# Patient Record
Sex: MALE | Race: WHITE | NOT HISPANIC OR LATINO | Employment: OTHER | URBAN - METROPOLITAN AREA
[De-identification: names, ages, dates, MRNs, and addresses within clinical notes are randomized per-mention and may not be internally consistent; named-entity substitution may affect disease eponyms.]

---

## 2017-02-23 ENCOUNTER — OFFICE VISIT (OUTPATIENT)
Dept: URGENT CARE | Facility: CLINIC | Age: 72
End: 2017-02-23
Payer: MEDICARE

## 2017-02-23 ENCOUNTER — HOSPITAL ENCOUNTER (OUTPATIENT)
Facility: MEDICAL CENTER | Age: 72
End: 2017-02-23
Attending: EMERGENCY MEDICINE
Payer: MEDICARE

## 2017-02-23 VITALS
DIASTOLIC BLOOD PRESSURE: 80 MMHG | HEART RATE: 98 BPM | OXYGEN SATURATION: 100 % | BODY MASS INDEX: 30.06 KG/M2 | RESPIRATION RATE: 16 BRPM | HEIGHT: 70 IN | SYSTOLIC BLOOD PRESSURE: 150 MMHG | TEMPERATURE: 98.7 F | WEIGHT: 210 LBS

## 2017-02-23 DIAGNOSIS — L25.3 CONTACT DERMATITIS DUE TO OTHER CHEMICAL PRODUCT, UNSPECIFIED CONTACT DERMATITIS TYPE: ICD-10-CM

## 2017-02-23 DIAGNOSIS — L03.211 FACIAL CELLULITIS: ICD-10-CM

## 2017-02-23 DIAGNOSIS — D49.0: ICD-10-CM

## 2017-02-23 PROCEDURE — G8432 DEP SCR NOT DOC, RNG: HCPCS | Performed by: EMERGENCY MEDICINE

## 2017-02-23 PROCEDURE — 4040F PNEUMOC VAC/ADMIN/RCVD: CPT | Mod: 8P | Performed by: EMERGENCY MEDICINE

## 2017-02-23 PROCEDURE — 1101F PT FALLS ASSESS-DOCD LE1/YR: CPT | Mod: 8P | Performed by: EMERGENCY MEDICINE

## 2017-02-23 PROCEDURE — G8419 CALC BMI OUT NRM PARAM NOF/U: HCPCS | Performed by: EMERGENCY MEDICINE

## 2017-02-23 PROCEDURE — 87070 CULTURE OTHR SPECIMN AEROBIC: CPT

## 2017-02-23 PROCEDURE — 87186 SC STD MICRODIL/AGAR DIL: CPT

## 2017-02-23 PROCEDURE — G8484 FLU IMMUNIZE NO ADMIN: HCPCS | Performed by: EMERGENCY MEDICINE

## 2017-02-23 PROCEDURE — 99203 OFFICE O/P NEW LOW 30 MIN: CPT | Performed by: EMERGENCY MEDICINE

## 2017-02-23 PROCEDURE — 87077 CULTURE AEROBIC IDENTIFY: CPT

## 2017-02-23 PROCEDURE — 87205 SMEAR GRAM STAIN: CPT

## 2017-02-23 PROCEDURE — 1036F TOBACCO NON-USER: CPT | Performed by: EMERGENCY MEDICINE

## 2017-02-23 PROCEDURE — 3017F COLORECTAL CA SCREEN DOC REV: CPT | Mod: 8P | Performed by: EMERGENCY MEDICINE

## 2017-02-23 RX ORDER — CEPHALEXIN 500 MG/1
1000 CAPSULE ORAL 2 TIMES DAILY
Qty: 40 CAP | Refills: 0 | Status: SHIPPED | OUTPATIENT
Start: 2017-02-23 | End: 2017-03-05

## 2017-02-23 RX ORDER — TRIAMCINOLONE ACETONIDE 1 MG/G
1 CREAM TOPICAL 2 TIMES DAILY
Qty: 30 G | Refills: 0 | Status: ON HOLD | OUTPATIENT
Start: 2017-02-23 | End: 2021-08-12

## 2017-02-23 ASSESSMENT — ENCOUNTER SYMPTOMS
WEIGHT LOSS: 0
HEADACHES: 0
SORE THROAT: 0
LIP LACERATION: 1
SHORTNESS OF BREATH: 0
RHINORRHEA: 0
COUGH: 0
FEVER: 0
EYE PAIN: 0
ANOREXIA: 0
FATIGUE: 0
CHILLS: 0

## 2017-02-23 NOTE — MR AVS SNAPSHOT
"        Rajesh De Luna   2017 3:00 PM   Office Visit   MRN: [id number]    Department:  [facility]   Dept Phone:  [phone]    Description:  Male : 1945   Provider:  Michael Miller M.D.           Reason for Visit     Oral Pain red blisters around mouth x 1 month      Allergies as of 2017     Allergen Noted Reactions    Penicillins 2017         You were diagnosed with     Neoplasm of upper lip   [235557]       Facial cellulitis   [[id number]]       Contact dermatitis due to other chemical product, unspecified contact dermatitis type   [1522813]         Vital Signs     Blood Pressure Pulse Temperature Respirations Height Weight    150/80 mmHg 98 37.1 °C (98.7 °F) 16 1.778 m (5' 10\") 95.255 kg (210 lb)    Body Mass Index Oxygen Saturation Smoking Status             30.13 kg/m2 100% Never Smoker          Basic Information     Date Of Birth Sex Race Ethnicity Preferred Language    1945 Male White Non- English      Health Maintenance     Patient has no pending health maintenance at this time      Current Immunizations     No immunizations on file.      Below and/or attached are the medications your provider expects you to take. Review all of your home medications and newly ordered medications with your provider and/or pharmacist. Follow medication instructions as directed by your provider and/or pharmacist. Please keep your medication list with you and share with your provider. Update the information when medications are discontinued, doses are changed, or new medications (including over-the-counter products) are added; and carry medication information at all times in the event of emergency situations     Allergies:  PENICILLINS - (reactions not documented)               Medications  Valid as of: 2017 -  3:36 PM    Generic Name Brand Name Tablet Size Instructions for use    Cephalexin (Cap) KEFLEX 500 MG Take 2 Caps by mouth 2 times a day for 10 days.       " Triamcinolone Acetonide (Cream) KENALOG 0.1 % Apply 1 Application to affected area(s) 2 times a day.        .                 Medicines prescribed today were sent to:     [facility] - [place], [place] - [address] [place] [address]    Phone: [phone] Fax: [phone]    Open 24 Hours?: No      Medication refill instructions:       If your prescription bottle indicates you have medication refills left, it is not necessary to call your provider’s office. Please contact your pharmacy and they will refill your medication.    If your prescription bottle indicates you do not have any refills left, you may request refills at any time through one of the following ways: The online [facility] system (except Urgent Care), by calling your provider’s office, or by asking your pharmacy to contact your provider’s office with a refill request. Medication refills are processed only during regular business hours and may not be available until the next business day. Your provider may request additional information or to have a follow-up visit with you prior to refilling your medication.   *Please Note: Medication refills are assigned a new Rx number when refilled electronically. Your pharmacy may indicate that no refills were authorized even though a new prescription for the same medication is available at the pharmacy. Please request the medicine by name with the pharmacy before contacting your provider for a refill.        Referral     A referral request has been sent to our patient care coordination department. Please allow 3-5 business days for us to process this request and contact you either by phone or mail. If you do not hear from us by the 5th business day, please call us at [phone].           [facility] Status: Patient Declined

## 2017-02-23 NOTE — PROGRESS NOTES
"Subjective:      Rajesh De Luna is a 71 y.o. male who presents with Oral Pain            Rash  This is a new problem. The problem has been gradually worsening since onset. The affected locations include the face and lips. The rash is characterized by itchiness and draining. Associated with: Carmex, Abreva. Associated symptoms include facial edema. Pertinent negatives include no anorexia, congestion, cough, eye pain, fatigue, fever, rhinorrhea, shortness of breath or sore throat.    notes onset of left upper lip lesion 2 months ago; tried over-the-counter products with gradual worsening. No PCP, hasn't seen a doctor in many years. Notes occasional cigar use, no chewing tobacco, no regular alcohol use    Review of Systems   Constitutional: Negative for fever, chills, weight loss, malaise/fatigue and fatigue.   HENT: Negative for congestion, nosebleeds, rhinorrhea and sore throat.    Eyes: Negative for pain.   Respiratory: Negative for cough and shortness of breath.    Gastrointestinal: Negative for anorexia.   Skin: Positive for itching and rash.   Neurological: Negative for headaches.       PMH:  has no past medical history on file.  MEDS:   Current outpatient prescriptions:   •  triamcinolone acetonide (KENALOG) 0.1 % Cream, Apply 1 Application to affected area(s) 2 times a day., Disp: 30 g, Rfl: 0  •  cephALEXin (KEFLEX) 500 MG Cap, Take 2 Caps by mouth 2 times a day for 10 days., Disp: 40 Cap, Rfl: 0  ALLERGIES:   Allergies   Allergen Reactions   • Penicillins      SURGHX: History reviewed. No pertinent past surgical history.  SOCHX:  reports that he has never smoked. He does not have any smokeless tobacco history on file.  FH: family history is not on file.     Objective:     /80 mmHg  Pulse 98  Temp(Src) 37.1 °C (98.7 °F)  Resp 16  Ht 1.778 m (5' 10\")  Wt 95.255 kg (210 lb)  BMI 30.13 kg/m2  SpO2 100%     Physical Exam   Constitutional: He is oriented to person, place, and time. He appears " well-developed and well-nourished. He is cooperative. He does not have a sickly appearance. No distress.   HENT:   Head: Normocephalic.   Right Ear: External ear normal.   Left Ear: External ear normal.   Nose: Nose normal.   Mouth/Throat: Uvula is midline. Mucous membranes are not pale and not dry. Abnormal dentition. No uvula swelling. No oropharyngeal exudate, posterior oropharyngeal edema or posterior oropharyngeal erythema.       Pulmonary/Chest: Effort normal.   Lymphadenopathy:        Head (left side): No submandibular adenopathy present.     He has no cervical adenopathy.   Neurological: He is alert and oriented to person, place, and time.   Skin: Rash noted.   Left midface erythema, localized to left nasolabial region. Coalescent finely vesicular component adjacent to left upper lip lesion.   Psychiatric: He has a normal mood and affect.          Advised need for regular PCP follow-up     Assessment/Plan:     1. 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 Facial cellulitis  Advised need for recheck in 2 days  - cephALEXin (KEFLEX) 500 MG Cap; Take 2 Caps by mouth 2 times a day for 10 days.  Dispense: 40 Cap; Refill: 0    3. Contact dermatitis due to other chemical product, unspecified contact dermatitis type  Advised discontinue all over-the-counter topical products  - triamcinolone acetonide (KENALOG) 0.1 % Cream; Apply 1 Application to affected area(s) 2 times a day.  Dispense: 30 g; Refill: 0

## 2017-02-24 LAB
GRAM STN SPEC: NORMAL
SIGNIFICANT IND 70042: NORMAL
SITE SITE: NORMAL
SOURCE SOURCE: NORMAL

## 2017-02-25 LAB
BACTERIA WND AEROBE CULT: ABNORMAL
BACTERIA WND AEROBE CULT: ABNORMAL
GRAM STN SPEC: ABNORMAL
SIGNIFICANT IND 70042: ABNORMAL
SITE SITE: ABNORMAL
SOURCE SOURCE: ABNORMAL

## 2017-02-27 ENCOUNTER — TELEPHONE (OUTPATIENT)
Dept: URGENT CARE | Facility: CLINIC | Age: 72
End: 2017-02-27

## 2017-02-27 NOTE — TELEPHONE ENCOUNTER
Please notify patient of culture with Staph bacteria; should resolve with Rx cephalexin.  Needs F/U dermatology ASAP.

## 2017-02-28 ENCOUNTER — TELEPHONE (OUTPATIENT)
Dept: URGENT CARE | Facility: CLINIC | Age: 72
End: 2017-02-28

## 2017-03-01 ENCOUNTER — TELEPHONE (OUTPATIENT)
Dept: URGENT CARE | Facility: PHYSICIAN GROUP | Age: 72
End: 2017-03-01

## 2019-08-22 ENCOUNTER — OFFICE VISIT (OUTPATIENT)
Dept: URGENT CARE | Facility: CLINIC | Age: 74
End: 2019-08-22
Payer: MEDICARE

## 2019-08-22 VITALS
BODY MASS INDEX: 26.08 KG/M2 | HEART RATE: 67 BPM | DIASTOLIC BLOOD PRESSURE: 98 MMHG | RESPIRATION RATE: 12 BRPM | TEMPERATURE: 98.4 F | SYSTOLIC BLOOD PRESSURE: 138 MMHG | WEIGHT: 182.2 LBS | HEIGHT: 70 IN | OXYGEN SATURATION: 96 %

## 2019-08-22 DIAGNOSIS — I10 HYPERTENSION, UNSPECIFIED TYPE: ICD-10-CM

## 2019-08-22 PROCEDURE — 99214 OFFICE O/P EST MOD 30 MIN: CPT | Performed by: PHYSICIAN ASSISTANT

## 2019-08-22 RX ORDER — ATORVASTATIN CALCIUM 20 MG/1
20 TABLET, FILM COATED ORAL DAILY
Qty: 90 TAB | Refills: 1 | Status: SHIPPED | OUTPATIENT
Start: 2019-08-22 | End: 2019-11-20

## 2019-08-22 SDOH — HEALTH STABILITY: MENTAL HEALTH: HOW OFTEN DO YOU HAVE 6 OR MORE DRINKS ON ONE OCCASION?: MONTHLY

## 2019-08-22 ASSESSMENT — ENCOUNTER SYMPTOMS
CONSTITUTIONAL NEGATIVE: 1
RESPIRATORY NEGATIVE: 1
ORTHOPNEA: 0
MUSCULOSKELETAL NEGATIVE: 1
PSYCHIATRIC NEGATIVE: 1
EYES NEGATIVE: 1
PND: 0
HEADACHES: 0
GASTROINTESTINAL NEGATIVE: 1
BLURRED VISION: 0
CARDIOVASCULAR NEGATIVE: 1
HYPERTENSION: 1
NEUROLOGICAL NEGATIVE: 1

## 2019-08-23 NOTE — PROGRESS NOTES
"Subjective:      Rajesh De Luna is a 74 y.o. male who presents with Blood Pressure Problem (x 7 days.  Pt. states that he ran out of Lipitor 20 mg, 21 days ago.  He just moved from [place] and has a np appt in November.)            Hypertension   This is a chronic problem. The problem is unchanged. Pertinent negatives include no blurred vision, headaches, orthopnea, peripheral edema or PND. Past treatments include angiotensin blockers. There are no compliance problems.        Review of Systems   Constitutional: Negative.    HENT: Negative.    Eyes: Negative.  Negative for blurred vision.   Respiratory: Negative.    Cardiovascular: Negative.  Negative for orthopnea and PND.   Gastrointestinal: Negative.    Genitourinary: Negative.    Musculoskeletal: Negative.    Skin: Negative.    Neurological: Negative.  Negative for headaches.   Endo/Heme/Allergies: Negative.    Psychiatric/Behavioral: Negative.      PMH:  has no past medical history on file.  MEDS:   Current Outpatient Medications:   •  atorvastatin (LIPITOR) 20 MG Tab, Take 1 Tab by mouth every day for 90 days., Disp: 90 Tab, Rfl: 1  •  triamcinolone acetonide (KENALOG) 0.1 % Cream, Apply 1 Application to affected area(s) 2 times a day., Disp: 30 g, Rfl: 0  ALLERGIES:   Allergies   Allergen Reactions   • Penicillins      SURGHX: History reviewed. No pertinent surgical history.  SOCHX:  reports that he has been smoking. He has never used smokeless tobacco. He reports that he drinks about 14.4 oz of alcohol per week. He reports that he does not use drugs.  FH: Family history was reviewed, no pertinent findings to report  Medications, Allergies, and current problem list reviewed today in Epic       Objective:     /98   Pulse 67   Temp 36.9 °C (98.4 °F) (Temporal)   Resp 12   Ht 1.778 m (5' 10\")   Wt 82.6 kg (182 lb 3.2 oz)   SpO2 96%   BMI 26.14 kg/m²      Physical Exam   Constitutional: He is oriented to person, place, and time. He appears " well-developed and well-nourished.  Non-toxic appearance. He does not have a sickly appearance. He does not appear ill. No distress.   HENT:   Head: Normocephalic and atraumatic.   Right Ear: External ear normal.   Left Ear: External ear normal.   Eyes: Conjunctivae and EOM are normal.   Neck: Normal range of motion. Neck supple.   Cardiovascular: Normal rate, regular rhythm, normal heart sounds, intact distal pulses and normal pulses.   Pulmonary/Chest: Effort normal and breath sounds normal.   Musculoskeletal: He exhibits no edema, tenderness or deformity.   Neurological: He is alert and oriented to person, place, and time. He has normal reflexes. He displays normal reflexes. He exhibits normal muscle tone. Coordination normal.   Skin: Skin is warm and dry. He is not diaphoretic.   Psychiatric: He has a normal mood and affect. His behavior is normal. Judgment and thought content normal.   Vitals reviewed.              Assessment/Plan:     1. Hypertension, unspecified type  - PCP follow up in 3 months  - atorvastatin (LIPITOR) 20 MG Tab; Take 1 Tab by mouth every day for 90 days.  Dispense: 90 Tab; Refill: 1    Differential diagnosis, natural history, supportive care discussed. Follow-up with primary care provider within 7-10 days, emergency room precautions discussed.  Patient and/or family appears understanding of information.  Handout and review of patients diagnosis and treatment was discussed extensively.

## 2019-08-27 ENCOUNTER — TELEPHONE (OUTPATIENT)
Dept: URGENT CARE | Facility: CLINIC | Age: 74
End: 2019-08-27

## 2019-08-27 NOTE — TELEPHONE ENCOUNTER
Pt came into clinic. He says he does not want the generic medicine atorvastatin. He wants Lipitor sent in.

## 2020-06-12 ENCOUNTER — OFFICE VISIT (OUTPATIENT)
Dept: URGENT CARE | Facility: CLINIC | Age: 75
End: 2020-06-12
Payer: MEDICARE

## 2020-06-12 VITALS
BODY MASS INDEX: 26.05 KG/M2 | HEART RATE: 66 BPM | HEIGHT: 70 IN | SYSTOLIC BLOOD PRESSURE: 152 MMHG | TEMPERATURE: 97.7 F | OXYGEN SATURATION: 98 % | RESPIRATION RATE: 14 BRPM | DIASTOLIC BLOOD PRESSURE: 84 MMHG | WEIGHT: 182 LBS

## 2020-06-12 DIAGNOSIS — I10 HYPERTENSION, UNSPECIFIED TYPE: ICD-10-CM

## 2020-06-12 PROCEDURE — 99213 OFFICE O/P EST LOW 20 MIN: CPT | Performed by: PHYSICIAN ASSISTANT

## 2020-06-12 RX ORDER — ATORVASTATIN CALCIUM 20 MG/1
20 TABLET, FILM COATED ORAL DAILY
Qty: 30 TAB | Refills: 3 | Status: ON HOLD | OUTPATIENT
Start: 2020-06-12 | End: 2021-06-13 | Stop reason: SDUPTHER

## 2020-06-12 ASSESSMENT — ENCOUNTER SYMPTOMS
EYE PAIN: 0
PALPITATIONS: 0
SPEECH CHANGE: 0
DIZZINESS: 0
DIARRHEA: 0
SORE THROAT: 0
VOMITING: 0
FEVER: 0
WEAKNESS: 0
DIAPHORESIS: 0
PHOTOPHOBIA: 0
WHEEZING: 0
NAUSEA: 0
MYALGIAS: 0
TREMORS: 0
SENSORY CHANGE: 0
LOSS OF CONSCIOUSNESS: 0
SINUS PAIN: 0
DOUBLE VISION: 0
BLURRED VISION: 0
HEADACHES: 1
COUGH: 0
SHORTNESS OF BREATH: 0
CHILLS: 0
ABDOMINAL PAIN: 0
WEIGHT LOSS: 0
TINGLING: 0

## 2020-06-12 NOTE — PROGRESS NOTES
Subjective:   Rajesh De Luna is a 74 y.o. male who presents for Headache (rapid heart beat, blood pressure refill )    HPI:  This is a 74-year-old male presenting to the clinic requesting a refill for his cholesterol medication.  The patient has been on this medication for years however stopped taking it the last few months and is now concerned and would like to restart the medication.  Currently he does not have a primary care provider in town.  Currently the patient is asymptomatic he does not have a headache.  The patient states he has an occasional rapid heartbeat this is been chronic and going on for years.  The patient denies any chest pain, shortness of breath, fevers, chills, nausea or vomiting.  He has not experienced any change in vision, paresthesias or syncopal events.    Review of Systems   Constitutional: Negative for chills, diaphoresis, fever, malaise/fatigue and weight loss.   HENT: Negative for congestion, sinus pain and sore throat.    Eyes: Negative for blurred vision, double vision, photophobia and pain.   Respiratory: Negative for cough, shortness of breath and wheezing.    Cardiovascular: Negative for chest pain and palpitations.   Gastrointestinal: Negative for abdominal pain, diarrhea, nausea and vomiting.   Musculoskeletal: Negative for myalgias.   Skin: Negative for rash.   Neurological: Positive for headaches (Intermittent mild headaches that have been going on for years the patient states). Negative for dizziness, tingling, tremors, sensory change, speech change, loss of consciousness and weakness.       Medications:    • triamcinolone acetonide Crea    Allergies: Penicillins    Problem List: Rajesh De Luna does not have a problem list on file.    Surgical History:  No past surgical history on file.    Past Social Hx: Rajesh De Luna  reports that he has been smoking. He has never used smokeless tobacco. He reports current alcohol use of about 14.4 oz of alcohol per week. He reports  "that he does not use drugs.     Past Family Hx:  Rajesh De Luna family history is not on file.     Problem list, medications, and allergies reviewed by myself today in Epic.     Objective:     /84   Pulse 66   Temp 36.5 °C (97.7 °F) (Temporal)   Resp 14   Ht 1.778 m (5' 10\")   Wt 82.6 kg (182 lb)   SpO2 98%   BMI 26.11 kg/m²     Physical Exam  Constitutional:       General: He is not in acute distress.     Appearance: Normal appearance. He is not ill-appearing, toxic-appearing or diaphoretic.   HENT:      Head: Normocephalic and atraumatic.      Right Ear: Tympanic membrane, ear canal and external ear normal.      Left Ear: Tympanic membrane, ear canal and external ear normal.      Nose: Nose normal. No congestion or rhinorrhea.      Mouth/Throat:      Mouth: Mucous membranes are moist.      Pharynx: No oropharyngeal exudate or posterior oropharyngeal erythema.   Eyes:      Extraocular Movements: Extraocular movements intact.      Conjunctiva/sclera: Conjunctivae normal.      Pupils: Pupils are equal, round, and reactive to light.   Neck:      Musculoskeletal: Normal range of motion. No muscular tenderness.      Vascular: No carotid bruit.   Cardiovascular:      Rate and Rhythm: Normal rate and regular rhythm.      Pulses: Normal pulses.      Heart sounds: Normal heart sounds.   Pulmonary:      Effort: Pulmonary effort is normal.      Breath sounds: Normal breath sounds. No wheezing.   Abdominal:      Palpations: Abdomen is soft.      Tenderness: There is no abdominal tenderness.   Lymphadenopathy:      Cervical: No cervical adenopathy.   Skin:     General: Skin is warm and dry.      Capillary Refill: Capillary refill takes less than 2 seconds.   Neurological:      General: No focal deficit present.      Mental Status: He is alert and oriented to person, place, and time. Mental status is at baseline.      Motor: No weakness.      Gait: Gait normal.      Deep Tendon Reflexes: Reflexes normal. "   Psychiatric:         Mood and Affect: Mood normal.         Behavior: Behavior normal.         Thought Content: Thought content normal.           Assessment/Plan:     Diagnosis and associated orders:     1. Hypertension, unspecified type    - atorvastatin (LIPITOR) 20 MG Tab; Take 1 Tab by mouth every day.  Dispense: 30 Tab; Refill: 3    - REFERRAL TO FOLLOW-UP WITH PRIMARY CARE   Comments/MDM:       • The patient presented to the clinic today requesting a refill of Lipitor.  He was previously on this medication for years and recently stopped.  He states he is a little nervous after he stopped and would like to restart the medication.  He currently does not have a PCP in town.  A referral to a PCP was sent.  The patient was asymptomatic and had a normal physical exam in clinic today.  Follow-up in the clinic for any presentation or concerning symptoms.           Differential diagnosis, natural history, supportive care, and indications for immediate follow-up discussed.    Advised the patient to follow-up with the primary care physician for recheck, reevaluation, and consideration of further management.    Please note that this dictation was created using voice recognition software. I have made reasonable attempt to correct obvious errors, but I expect that there are errors of grammar and possibly content that I did not discover before finalizing the note.    This note was electronically signed by ANITA Min PA-C

## 2021-01-14 DIAGNOSIS — Z23 NEED FOR VACCINATION: ICD-10-CM

## 2021-06-01 ENCOUNTER — APPOINTMENT (OUTPATIENT)
Dept: RADIOLOGY | Facility: MEDICAL CENTER | Age: 76
DRG: 308 | End: 2021-06-01
Attending: EMERGENCY MEDICINE
Payer: MEDICARE

## 2021-06-01 ENCOUNTER — HOSPITAL ENCOUNTER (INPATIENT)
Facility: MEDICAL CENTER | Age: 76
LOS: 4 days | DRG: 308 | End: 2021-06-05
Attending: EMERGENCY MEDICINE | Admitting: INTERNAL MEDICINE
Payer: MEDICARE

## 2021-06-01 DIAGNOSIS — Z91.148 HISTORY OF MEDICATION NONCOMPLIANCE: ICD-10-CM

## 2021-06-01 DIAGNOSIS — J81.0 ACUTE PULMONARY EDEMA (HCC): ICD-10-CM

## 2021-06-01 DIAGNOSIS — I48.91 ATRIAL FIBRILLATION WITH RVR (HCC): ICD-10-CM

## 2021-06-01 PROBLEM — I48.0 PAROXYSMAL ATRIAL FIBRILLATION (HCC): Status: ACTIVE | Noted: 2021-06-01

## 2021-06-01 PROBLEM — R79.89 ELEVATED TROPONIN: Status: ACTIVE | Noted: 2021-06-01

## 2021-06-01 PROBLEM — R06.02 SHORTNESS OF BREATH: Status: ACTIVE | Noted: 2021-06-01

## 2021-06-01 PROBLEM — J96.01 ACUTE RESPIRATORY FAILURE WITH HYPOXIA (HCC): Status: ACTIVE | Noted: 2021-06-01

## 2021-06-01 LAB
ALBUMIN SERPL BCP-MCNC: 3.9 G/DL (ref 3.2–4.9)
ALBUMIN/GLOB SERPL: 1.3 G/DL
ALP SERPL-CCNC: 164 U/L (ref 30–99)
ALT SERPL-CCNC: 22 U/L (ref 2–50)
ANION GAP SERPL CALC-SCNC: 13 MMOL/L (ref 7–16)
AST SERPL-CCNC: 21 U/L (ref 12–45)
BASOPHILS # BLD AUTO: 0.8 % (ref 0–1.8)
BASOPHILS # BLD: 0.06 K/UL (ref 0–0.12)
BILIRUB SERPL-MCNC: 1.1 MG/DL (ref 0.1–1.5)
BUN SERPL-MCNC: 19 MG/DL (ref 8–22)
CALCIUM SERPL-MCNC: 8.8 MG/DL (ref 8.5–10.5)
CHLORIDE SERPL-SCNC: 110 MMOL/L (ref 96–112)
CO2 SERPL-SCNC: 17 MMOL/L (ref 20–33)
CREAT SERPL-MCNC: 1.03 MG/DL (ref 0.5–1.4)
EOSINOPHIL # BLD AUTO: 0.16 K/UL (ref 0–0.51)
EOSINOPHIL NFR BLD: 2.1 % (ref 0–6.9)
ERYTHROCYTE [DISTWIDTH] IN BLOOD BY AUTOMATED COUNT: 51.8 FL (ref 35.9–50)
GLOBULIN SER CALC-MCNC: 2.9 G/DL (ref 1.9–3.5)
GLUCOSE SERPL-MCNC: 100 MG/DL (ref 65–99)
HCT VFR BLD AUTO: 46.6 % (ref 42–52)
HGB BLD-MCNC: 15.3 G/DL (ref 14–18)
IMM GRANULOCYTES # BLD AUTO: 0.02 K/UL (ref 0–0.11)
IMM GRANULOCYTES NFR BLD AUTO: 0.3 % (ref 0–0.9)
LYMPHOCYTES # BLD AUTO: 1.58 K/UL (ref 1–4.8)
LYMPHOCYTES NFR BLD: 21.1 % (ref 22–41)
MCH RBC QN AUTO: 32 PG (ref 27–33)
MCHC RBC AUTO-ENTMCNC: 32.8 G/DL (ref 33.7–35.3)
MCV RBC AUTO: 97.5 FL (ref 81.4–97.8)
MONOCYTES # BLD AUTO: 0.65 K/UL (ref 0–0.85)
MONOCYTES NFR BLD AUTO: 8.7 % (ref 0–13.4)
NEUTROPHILS # BLD AUTO: 5.03 K/UL (ref 1.82–7.42)
NEUTROPHILS NFR BLD: 67 % (ref 44–72)
NRBC # BLD AUTO: 0 K/UL
NRBC BLD-RTO: 0 /100 WBC
NT-PROBNP SERPL IA-MCNC: 4516 PG/ML (ref 0–125)
PLATELET # BLD AUTO: 200 K/UL (ref 164–446)
PMV BLD AUTO: 11 FL (ref 9–12.9)
POTASSIUM SERPL-SCNC: 4.7 MMOL/L (ref 3.6–5.5)
PROT SERPL-MCNC: 6.8 G/DL (ref 6–8.2)
RBC # BLD AUTO: 4.78 M/UL (ref 4.7–6.1)
SODIUM SERPL-SCNC: 140 MMOL/L (ref 135–145)
TROPONIN T SERPL-MCNC: 28 NG/L (ref 6–19)
WBC # BLD AUTO: 7.5 K/UL (ref 4.8–10.8)

## 2021-06-01 PROCEDURE — 96374 THER/PROPH/DIAG INJ IV PUSH: CPT

## 2021-06-01 PROCEDURE — 93005 ELECTROCARDIOGRAM TRACING: CPT

## 2021-06-01 PROCEDURE — A9270 NON-COVERED ITEM OR SERVICE: HCPCS | Performed by: INTERNAL MEDICINE

## 2021-06-01 PROCEDURE — 700111 HCHG RX REV CODE 636 W/ 250 OVERRIDE (IP): Performed by: EMERGENCY MEDICINE

## 2021-06-01 PROCEDURE — 770020 HCHG ROOM/CARE - TELE (206)

## 2021-06-01 PROCEDURE — 99285 EMERGENCY DEPT VISIT HI MDM: CPT

## 2021-06-01 PROCEDURE — 93005 ELECTROCARDIOGRAM TRACING: CPT | Performed by: EMERGENCY MEDICINE

## 2021-06-01 PROCEDURE — 96375 TX/PRO/DX INJ NEW DRUG ADDON: CPT

## 2021-06-01 PROCEDURE — 71045 X-RAY EXAM CHEST 1 VIEW: CPT

## 2021-06-01 PROCEDURE — 85025 COMPLETE CBC W/AUTO DIFF WBC: CPT

## 2021-06-01 PROCEDURE — 80053 COMPREHEN METABOLIC PANEL: CPT

## 2021-06-01 PROCEDURE — 84484 ASSAY OF TROPONIN QUANT: CPT

## 2021-06-01 PROCEDURE — 83880 ASSAY OF NATRIURETIC PEPTIDE: CPT

## 2021-06-01 PROCEDURE — 700102 HCHG RX REV CODE 250 W/ 637 OVERRIDE(OP): Performed by: INTERNAL MEDICINE

## 2021-06-01 PROCEDURE — 99221 1ST HOSP IP/OBS SF/LOW 40: CPT | Mod: AI | Performed by: INTERNAL MEDICINE

## 2021-06-01 RX ORDER — LISINOPRIL 10 MG/1
5 TABLET ORAL
Status: DISCONTINUED | OUTPATIENT
Start: 2021-06-02 | End: 2021-06-01

## 2021-06-01 RX ORDER — FUROSEMIDE 10 MG/ML
20 INJECTION INTRAMUSCULAR; INTRAVENOUS ONCE
Status: COMPLETED | OUTPATIENT
Start: 2021-06-01 | End: 2021-06-01

## 2021-06-01 RX ORDER — AMOXICILLIN 250 MG
2 CAPSULE ORAL 2 TIMES DAILY
Status: DISCONTINUED | OUTPATIENT
Start: 2021-06-01 | End: 2021-06-05 | Stop reason: HOSPADM

## 2021-06-01 RX ORDER — FUROSEMIDE 10 MG/ML
40 INJECTION INTRAMUSCULAR; INTRAVENOUS
Status: DISCONTINUED | OUTPATIENT
Start: 2021-06-02 | End: 2021-06-05 | Stop reason: HOSPADM

## 2021-06-01 RX ORDER — BISACODYL 10 MG
10 SUPPOSITORY, RECTAL RECTAL
Status: DISCONTINUED | OUTPATIENT
Start: 2021-06-01 | End: 2021-06-05 | Stop reason: HOSPADM

## 2021-06-01 RX ORDER — ONDANSETRON 4 MG/1
4 TABLET, ORALLY DISINTEGRATING ORAL EVERY 4 HOURS PRN
Status: DISCONTINUED | OUTPATIENT
Start: 2021-06-01 | End: 2021-06-05 | Stop reason: HOSPADM

## 2021-06-01 RX ORDER — ACETAMINOPHEN 325 MG/1
650 TABLET ORAL EVERY 6 HOURS PRN
Status: DISCONTINUED | OUTPATIENT
Start: 2021-06-01 | End: 2021-06-05 | Stop reason: HOSPADM

## 2021-06-01 RX ORDER — ATORVASTATIN CALCIUM 20 MG/1
20 TABLET, FILM COATED ORAL DAILY
Status: DISCONTINUED | OUTPATIENT
Start: 2021-06-02 | End: 2021-06-05 | Stop reason: HOSPADM

## 2021-06-01 RX ORDER — ONDANSETRON 2 MG/ML
4 INJECTION INTRAMUSCULAR; INTRAVENOUS EVERY 4 HOURS PRN
Status: DISCONTINUED | OUTPATIENT
Start: 2021-06-01 | End: 2021-06-05 | Stop reason: HOSPADM

## 2021-06-01 RX ORDER — DILTIAZEM HYDROCHLORIDE 5 MG/ML
20 INJECTION INTRAVENOUS ONCE
Status: COMPLETED | OUTPATIENT
Start: 2021-06-01 | End: 2021-06-01

## 2021-06-01 RX ORDER — METOPROLOL TARTRATE 1 MG/ML
5 INJECTION, SOLUTION INTRAVENOUS
Status: DISCONTINUED | OUTPATIENT
Start: 2021-06-01 | End: 2021-06-05 | Stop reason: HOSPADM

## 2021-06-01 RX ORDER — POLYETHYLENE GLYCOL 3350 17 G/17G
1 POWDER, FOR SOLUTION ORAL
Status: DISCONTINUED | OUTPATIENT
Start: 2021-06-01 | End: 2021-06-05 | Stop reason: HOSPADM

## 2021-06-01 RX ORDER — POTASSIUM CHLORIDE 20 MEQ/1
20 TABLET, EXTENDED RELEASE ORAL DAILY
Status: ON HOLD | COMMUNITY
End: 2021-06-13

## 2021-06-01 RX ORDER — CARVEDILOL 6.25 MG/1
6.25 TABLET ORAL 2 TIMES DAILY WITH MEALS
Status: DISCONTINUED | OUTPATIENT
Start: 2021-06-01 | End: 2021-06-01

## 2021-06-01 RX ORDER — FUROSEMIDE 20 MG/1
20 TABLET ORAL DAILY
Status: ON HOLD | COMMUNITY
End: 2021-06-05 | Stop reason: SDUPTHER

## 2021-06-01 RX ORDER — POTASSIUM CHLORIDE 20 MEQ/1
20 TABLET, EXTENDED RELEASE ORAL 2 TIMES DAILY
Status: DISCONTINUED | OUTPATIENT
Start: 2021-06-01 | End: 2021-06-05 | Stop reason: HOSPADM

## 2021-06-01 RX ADMIN — APIXABAN 5 MG: 5 TABLET, FILM COATED ORAL at 22:34

## 2021-06-01 RX ADMIN — FUROSEMIDE 20 MG: 10 INJECTION, SOLUTION INTRAMUSCULAR; INTRAVENOUS at 20:35

## 2021-06-01 RX ADMIN — DILTIAZEM HYDROCHLORIDE 20 MG: 5 INJECTION INTRAVENOUS at 19:53

## 2021-06-01 RX ADMIN — METOPROLOL TARTRATE 25 MG: 25 TABLET, FILM COATED ORAL at 22:34

## 2021-06-01 RX ADMIN — POTASSIUM CHLORIDE 20 MEQ: 1500 TABLET, EXTENDED RELEASE ORAL at 22:35

## 2021-06-01 ASSESSMENT — ENCOUNTER SYMPTOMS
HEARTBURN: 0
NERVOUS/ANXIOUS: 0
BLURRED VISION: 0
EYE REDNESS: 0
SHORTNESS OF BREATH: 1
FOCAL WEAKNESS: 0
EYE PAIN: 0
NECK PAIN: 0
BACK PAIN: 0
DEPRESSION: 0
CHILLS: 0
SPUTUM PRODUCTION: 0
VOMITING: 0
INSOMNIA: 0
NAUSEA: 0
DIARRHEA: 0
ABDOMINAL PAIN: 0
SEIZURES: 0
WEIGHT LOSS: 0
ORTHOPNEA: 0
STRIDOR: 0
EYE DISCHARGE: 0
COUGH: 0
HEADACHES: 0
FEVER: 0
DIZZINESS: 0
MYALGIAS: 0
PALPITATIONS: 0

## 2021-06-01 ASSESSMENT — PATIENT HEALTH QUESTIONNAIRE - PHQ9
2. FEELING DOWN, DEPRESSED, IRRITABLE, OR HOPELESS: NOT AT ALL
SUM OF ALL RESPONSES TO PHQ9 QUESTIONS 1 AND 2: 0
1. LITTLE INTEREST OR PLEASURE IN DOING THINGS: NOT AT ALL

## 2021-06-01 ASSESSMENT — LIFESTYLE VARIABLES
EVER FELT BAD OR GUILTY ABOUT YOUR DRINKING: NO
AVERAGE NUMBER OF DAYS PER WEEK YOU HAVE A DRINK CONTAINING ALCOHOL: 7
DOES PATIENT WANT TO STOP DRINKING: NO
CONSUMPTION TOTAL: NEGATIVE
HAVE YOU EVER FELT YOU SHOULD CUT DOWN ON YOUR DRINKING: NO
TOTAL SCORE: 0
ALCOHOL_USE: YES
TOTAL SCORE: 0
HAVE PEOPLE ANNOYED YOU BY CRITICIZING YOUR DRINKING: NO
ON A TYPICAL DAY WHEN YOU DRINK ALCOHOL HOW MANY DRINKS DO YOU HAVE: 1
TOTAL SCORE: 0
HOW MANY TIMES IN THE PAST YEAR HAVE YOU HAD 5 OR MORE DRINKS IN A DAY: 0
EVER HAD A DRINK FIRST THING IN THE MORNING TO STEADY YOUR NERVES TO GET RID OF A HANGOVER: NO

## 2021-06-01 ASSESSMENT — CHA2DS2 SCORE
AGE 75 OR GREATER: YES
SEX: MALE
CHF OR LEFT VENTRICULAR DYSFUNCTION: YES
CHA2DS2 VASC SCORE: 3
VASCULAR DISEASE: NO
AGE 65 TO 74: NO
HYPERTENSION: NO
DIABETES: NO
PRIOR STROKE OR TIA OR THROMBOEMBOLISM: NO

## 2021-06-01 ASSESSMENT — COGNITIVE AND FUNCTIONAL STATUS - GENERAL
SUGGESTED CMS G CODE MODIFIER MOBILITY: CH
MOBILITY SCORE: 24
SUGGESTED CMS G CODE MODIFIER DAILY ACTIVITY: CH
DAILY ACTIVITIY SCORE: 24

## 2021-06-01 ASSESSMENT — FIBROSIS 4 INDEX: FIB4 SCORE: 1.72

## 2021-06-02 ENCOUNTER — APPOINTMENT (OUTPATIENT)
Dept: CARDIOLOGY | Facility: MEDICAL CENTER | Age: 76
DRG: 308 | End: 2021-06-02
Attending: INTERNAL MEDICINE
Payer: MEDICARE

## 2021-06-02 LAB
ALBUMIN SERPL BCP-MCNC: 4.1 G/DL (ref 3.2–4.9)
ALBUMIN/GLOB SERPL: 1.4 G/DL
ALP SERPL-CCNC: 167 U/L (ref 30–99)
ALT SERPL-CCNC: 23 U/L (ref 2–50)
ANION GAP SERPL CALC-SCNC: 11 MMOL/L (ref 7–16)
AST SERPL-CCNC: 18 U/L (ref 12–45)
BASOPHILS # BLD AUTO: 0.8 % (ref 0–1.8)
BASOPHILS # BLD: 0.06 K/UL (ref 0–0.12)
BILIRUB SERPL-MCNC: 1.2 MG/DL (ref 0.1–1.5)
BUN SERPL-MCNC: 18 MG/DL (ref 8–22)
CALCIUM SERPL-MCNC: 9.4 MG/DL (ref 8.5–10.5)
CHLORIDE SERPL-SCNC: 108 MMOL/L (ref 96–112)
CO2 SERPL-SCNC: 22 MMOL/L (ref 20–33)
CREAT SERPL-MCNC: 1.17 MG/DL (ref 0.5–1.4)
EOSINOPHIL # BLD AUTO: 0.2 K/UL (ref 0–0.51)
EOSINOPHIL NFR BLD: 2.6 % (ref 0–6.9)
ERYTHROCYTE [DISTWIDTH] IN BLOOD BY AUTOMATED COUNT: 52.4 FL (ref 35.9–50)
GLOBULIN SER CALC-MCNC: 2.9 G/DL (ref 1.9–3.5)
GLUCOSE SERPL-MCNC: 99 MG/DL (ref 65–99)
HCT VFR BLD AUTO: 47.7 % (ref 42–52)
HGB BLD-MCNC: 15.4 G/DL (ref 14–18)
IMM GRANULOCYTES # BLD AUTO: 0.04 K/UL (ref 0–0.11)
IMM GRANULOCYTES NFR BLD AUTO: 0.5 % (ref 0–0.9)
LV EJECT FRACT  99904: 25
LV EJECT FRACT MOD 4C 99902: 25
LYMPHOCYTES # BLD AUTO: 1.75 K/UL (ref 1–4.8)
LYMPHOCYTES NFR BLD: 23 % (ref 22–41)
MCH RBC QN AUTO: 31.7 PG (ref 27–33)
MCHC RBC AUTO-ENTMCNC: 32.3 G/DL (ref 33.7–35.3)
MCV RBC AUTO: 98.1 FL (ref 81.4–97.8)
MONOCYTES # BLD AUTO: 0.63 K/UL (ref 0–0.85)
MONOCYTES NFR BLD AUTO: 8.3 % (ref 0–13.4)
NEUTROPHILS # BLD AUTO: 4.92 K/UL (ref 1.82–7.42)
NEUTROPHILS NFR BLD: 64.8 % (ref 44–72)
NRBC # BLD AUTO: 0 K/UL
NRBC BLD-RTO: 0 /100 WBC
NT-PROBNP SERPL IA-MCNC: 4828 PG/ML (ref 0–125)
PLATELET # BLD AUTO: 208 K/UL (ref 164–446)
PMV BLD AUTO: 11.3 FL (ref 9–12.9)
POTASSIUM SERPL-SCNC: 4.6 MMOL/L (ref 3.6–5.5)
PROT SERPL-MCNC: 7 G/DL (ref 6–8.2)
RBC # BLD AUTO: 4.86 M/UL (ref 4.7–6.1)
SODIUM SERPL-SCNC: 141 MMOL/L (ref 135–145)
WBC # BLD AUTO: 7.6 K/UL (ref 4.8–10.8)

## 2021-06-02 PROCEDURE — 93306 TTE W/DOPPLER COMPLETE: CPT | Mod: 26 | Performed by: INTERNAL MEDICINE

## 2021-06-02 PROCEDURE — 700117 HCHG RX CONTRAST REV CODE 255: Performed by: INTERNAL MEDICINE

## 2021-06-02 PROCEDURE — A9270 NON-COVERED ITEM OR SERVICE: HCPCS | Performed by: INTERNAL MEDICINE

## 2021-06-02 PROCEDURE — 700102 HCHG RX REV CODE 250 W/ 637 OVERRIDE(OP): Performed by: INTERNAL MEDICINE

## 2021-06-02 PROCEDURE — 770020 HCHG ROOM/CARE - TELE (206)

## 2021-06-02 PROCEDURE — 99233 SBSQ HOSP IP/OBS HIGH 50: CPT | Performed by: HOSPITALIST

## 2021-06-02 PROCEDURE — 36415 COLL VENOUS BLD VENIPUNCTURE: CPT

## 2021-06-02 PROCEDURE — 93306 TTE W/DOPPLER COMPLETE: CPT

## 2021-06-02 PROCEDURE — 83880 ASSAY OF NATRIURETIC PEPTIDE: CPT

## 2021-06-02 PROCEDURE — A9270 NON-COVERED ITEM OR SERVICE: HCPCS | Performed by: HOSPITALIST

## 2021-06-02 PROCEDURE — 700111 HCHG RX REV CODE 636 W/ 250 OVERRIDE (IP): Performed by: INTERNAL MEDICINE

## 2021-06-02 PROCEDURE — 700101 HCHG RX REV CODE 250: Performed by: INTERNAL MEDICINE

## 2021-06-02 PROCEDURE — 700102 HCHG RX REV CODE 250 W/ 637 OVERRIDE(OP): Performed by: HOSPITALIST

## 2021-06-02 PROCEDURE — 85025 COMPLETE CBC W/AUTO DIFF WBC: CPT

## 2021-06-02 PROCEDURE — 80053 COMPREHEN METABOLIC PANEL: CPT

## 2021-06-02 RX ORDER — CHOLECALCIFEROL (VITAMIN D3) 125 MCG
2.5 CAPSULE ORAL NIGHTLY
Status: DISCONTINUED | OUTPATIENT
Start: 2021-06-02 | End: 2021-06-03

## 2021-06-02 RX ORDER — METOPROLOL TARTRATE 50 MG/1
50 TABLET, FILM COATED ORAL TWICE DAILY
Status: DISCONTINUED | OUTPATIENT
Start: 2021-06-02 | End: 2021-06-04

## 2021-06-02 RX ADMIN — ACETAMINOPHEN 650 MG: 325 TABLET, FILM COATED ORAL at 21:37

## 2021-06-02 RX ADMIN — POTASSIUM CHLORIDE 20 MEQ: 1500 TABLET, EXTENDED RELEASE ORAL at 05:27

## 2021-06-02 RX ADMIN — METOPROLOL TARTRATE 25 MG: 25 TABLET, FILM COATED ORAL at 05:27

## 2021-06-02 RX ADMIN — METOPROLOL TARTRATE 5 MG: 1 INJECTION, SOLUTION INTRAVENOUS at 12:15

## 2021-06-02 RX ADMIN — HUMAN ALBUMIN MICROSPHERES AND PERFLUTREN 3 ML: 10; .22 INJECTION, SOLUTION INTRAVENOUS at 11:36

## 2021-06-02 RX ADMIN — METOPROLOL TARTRATE 50 MG: 50 TABLET, FILM COATED ORAL at 17:09

## 2021-06-02 RX ADMIN — APIXABAN 5 MG: 5 TABLET, FILM COATED ORAL at 17:09

## 2021-06-02 RX ADMIN — ATORVASTATIN CALCIUM 20 MG: 20 TABLET, FILM COATED ORAL at 05:27

## 2021-06-02 RX ADMIN — Medication 2.5 MG: at 22:45

## 2021-06-02 RX ADMIN — APIXABAN 5 MG: 5 TABLET, FILM COATED ORAL at 05:27

## 2021-06-02 RX ADMIN — FUROSEMIDE 40 MG: 10 INJECTION, SOLUTION INTRAMUSCULAR; INTRAVENOUS at 17:09

## 2021-06-02 RX ADMIN — FUROSEMIDE 40 MG: 10 INJECTION, SOLUTION INTRAMUSCULAR; INTRAVENOUS at 05:27

## 2021-06-02 RX ADMIN — POTASSIUM CHLORIDE 20 MEQ: 1500 TABLET, EXTENDED RELEASE ORAL at 17:09

## 2021-06-02 RX ADMIN — DOCUSATE SODIUM 50 MG AND SENNOSIDES 8.6 MG 2 TABLET: 8.6; 5 TABLET, FILM COATED ORAL at 17:09

## 2021-06-02 ASSESSMENT — PAIN DESCRIPTION - PAIN TYPE: TYPE: ACUTE PAIN

## 2021-06-02 NOTE — PROGRESS NOTES
[facility] Medicine Daily Progress Note    Date of Service  6/2/2021    Chief Complaint  75 y.o. male admitted 6/1/2021 with dyspnea    Hospital Course  No notes on file    Interval Problem Update  No acute overnight events, respiratory status improved slightly somewhat.  No chest discomfort.  Echo reveals an ejection fraction of 25% and a possible mural thrombus. .    Consultants/Specialty  None    Code Status  Full Code    Disposition  Likely to home in 24-48 hours.     Review of Systems  All systems reviewed and negative except as noted per above.    Physical Exam  Temp:  [36 °C (96.8 °F)-36.7 °C (98 °F)] 36.4 °C (97.6 °F)  Pulse:  [] 140  Resp:  [12-20] 20  BP: ()/(55-91) 110/79  SpO2:  [87 %-98 %] 93 %    General: No acute distress  HEENT atraumatic, normocephalic, pupils equal round reactive to light  Neck: No JVD  Chest: Respirations are unlabored  Cardiac: Physiologic S1 and S2  Abdomen: Soft, nontender, nondistended  Extremities: Without clubbing, cyanosis or edema  Neuro: Cranial nerves II through XII are grossly intact.  Psych: No anxiety, judgement intact.        Current Facility-Administered Medications:   •  metoprolol tartrate (LOPRESSOR) tablet 50 mg, 50 mg, Oral, TWICE DAILY, Ryan Cox M.D.  •  apixaban (ELIQUIS) tablet 5 mg, 5 mg, Oral, BID, Daron Valenzuela M.D., 5 mg at 06/02/21 0527  •  atorvastatin (LIPITOR) tablet 20 mg, 20 mg, Oral, DAILY, Daron Valenzuela M.D., 20 mg at 06/02/21 0527  •  senna-docusate (PERICOLACE or SENOKOT S) 8.6-50 MG per tablet 2 tablet, 2 tablet, Oral, BID **AND** polyethylene glycol/lytes (MIRALAX) PACKET 1 Packet, 1 Packet, Oral, QDAY PRN **AND** magnesium hydroxide (MILK OF MAGNESIA) suspension 30 mL, 30 mL, Oral, QDAY PRN **AND** bisacodyl (DULCOLAX) suppository 10 mg, 10 mg, Rectal, QDAY PRN, Daron Valenzuela M.D.  •  ondansetron (ZOFRAN) syringe/vial injection 4 mg, 4 mg, Intravenous, Q4HRS SANJAYN, Daron Valenzuela M.D.  •  ondansetron (ZOFRAN ODT) dispertab 4 mg, 4 mg,  Oral, Q4HRS PRN, Daron Valenzuela M.D.  •  acetaminophen (Tylenol) tablet 650 mg, 650 mg, Oral, Q6HRS PRN, Daron Valenzuela M.D.  •  furosemide (LASIX) injection 40 mg, 40 mg, Intravenous, BID DIURETIC, Daron Valenzuela M.D., 40 mg at 06/02/21 0527  •  potassium chloride SA (Kdur) tablet 20 mEq, 20 mEq, Oral, BID, Daron Valenzuela M.D., 20 mEq at 06/02/21 0527  •  Metoprolol Tartrate (LOPRESSOR) injection 5 mg, 5 mg, Intravenous, Q5 MIN PRN, Daron Valenzuela M.D., 5 mg at 06/02/21 1215      Fluids    Intake/Output Summary (Last 24 hours) at 6/2/2021 1639  Last data filed at 6/2/2021 0925  Gross per 24 hour   Intake 400 ml   Output 1650 ml   Net -1250 ml       Laboratory  Recent Labs     06/01/21  1930 06/02/21  0116   WBC 7.5 7.6   RBC 4.78 4.86   HEMOGLOBIN 15.3 15.4   HEMATOCRIT 46.6 47.7   MCV 97.5 98.1*   MCH 32.0 31.7   MCHC 32.8* 32.3*   RDW 51.8* 52.4*   PLATELETCT 200 208   MPV 11.0 11.3     Recent Labs     06/01/21  1930 06/02/21  0116   SODIUM 140 141   POTASSIUM 4.7 4.6   CHLORIDE 110 108   CO2 17* 22   GLUCOSE 100* 99   BUN 19 18   CREATININE 1.03 1.17   CALCIUM 8.8 9.4                   Imaging  EC-ECHOCARDIOGRAM COMPLETE W/ CONT   Final Result   Echocardiography Laboratory     CONCLUSIONS  Severely reduced left ventricular systolic function.  Global hypokinesis.  Reduced right ventricular systolic function.  Severely dilated left atrium - prominence of the posterior LA wall- possible thrombus/mass lesion- consider further evaluation with PORTILLO or CT/MRI  Mild mitral regurgitation.  LVEF 25%.      DX-CHEST-PORTABLE (1 VIEW)   Final Result      1.  Hypoinflation and mild pulmonary edema.   2.  No pneumonia or pneumothorax.           Assessment/Plan  Shortness of breath  Assessment & Plan  Likely related to pulmonary edema  Plan as above    Elevated troponin- (present on admission)  Assessment & Plan  Likely related to demand ischemia    Acute respiratory failure with hypoxia (HCC)- (present on admission)  Assessment & Plan  Likely  related to A. fib with rapid rate causing volume overload  Started on IV Lasix 40 mg twice daily  Adjust medication as needed  Check echocardiogram    Atrial fibrillation with rapid ventricular response (HCC)- (present on admission)  Assessment & Plan  Patient is not on rate control drugs  Started the patient on metoprolol 25 mg twice daily on admit.   Increased to 50mg BID on HD#1.  CTM  IV as needed metoprolol with parameters  Echo results per above.   Adjust medication as needed  Continue Eliquis       VTE prophylaxis: Continue Eliquis for atrial fibrillation.

## 2021-06-02 NOTE — ASSESSMENT & PLAN NOTE
Patient is not on rate control drugs  Started the patient on metoprolol 25 mg twice daily on admit.   Increased to 50mg BID on HD#1.  CTM  IV as needed metoprolol with parameters  Echo results per above.   Adjust medication as needed  Continue Eliquis

## 2021-06-02 NOTE — DISCHARGE PLANNING
Care Transition Team Assessment    No Emergency Contacts per patient      Spoke to pt at bedside who confirmed information on face sheet. Pt states he lives in [facility] in [place]. Pt states he is in between PCP's currently. Pt uses [facility] [address]. Pt does not use DME or HH. Pt states he will have transportation on discharge     Information Source  Orientation Level: Oriented X4  Information Given By: Patient  Who is responsible for making decisions for patient? : Patient    Elopement Risk  Legal Hold: No  Ambulatory or Self Mobile in Wheelchair: Yes  Disoriented: No  Psychiatric Symptoms: None  History of Wandering: No  Elopement this Admit: No  Vocalizing Wanting to Leave: No  Displays Behaviors, Body Language Wanting to Leave: No-Not at Risk for Elopement  Elopement Risk: Not at Risk for Elopement    Interdisciplinary Discharge Planning  Lives with - Patient's Self Care Capacity: Alone and Able to Care For Self  Patient or legal guardian wants to designate a caregiver: No  Support Systems: None  Housing / Facility: [address] / [facility]  [facility] of Care Facility: [facility]  Patient Prefers to be Discharged to:: home  Durable Medical Equipment: Not Applicable    Discharge Preparedness  What is your plan after discharge?: Home with help  Prior Functional Level: Independent with Activities of Daily Living    Functional Assesment  Prior Functional Level: Independent with Activities of Daily Living    Vision / Hearing Impairment  Vision Impairment : Yes  Right Eye Vision: Impaired, Wears Glasses  Left Eye Vision: Impaired, Wears Glasses  Hearing Impairment : No         Advance Directive  Advance Directive?: None  Advance Directive offered?: AD Booklet refused    Domestic Abuse  Have you ever been the victim of abuse or violence?: No  Physical Abuse or Sexual Abuse: No  Verbal Abuse or Emotional Abuse: No  Possible Abuse/Neglect Reported to:: Not Applicable

## 2021-06-02 NOTE — HEART FAILURE PROGRAM
I was contacted by the hospital schedulers. Because the heart failure order set was used, they received a schedule heart failure follow up order.    For now, it looks like patient has atrial fibrillation with RVR and acute pulmonary edema. There is an echocardiogram pending.    We will await results.    Thank you, Gayle, Cardio RN Navigator [id number]

## 2021-06-02 NOTE — CARE PLAN
The patient is Watcher - Medium risk of patient condition declining or worsening    Shift Goals  Clinical Goals: rate control; safety  Patient Goals: rest    Progress made toward(s) clinical / shift goals:    Problem: Care Map:  Day 1 Optimal Outcome for the Heart Failure Patient  Goal: Day 1:  Optimal Care of the heart failure patient  Outcome: Progressing     Problem: Knowledge Deficit - Standard  Goal: Patient and family/care givers will demonstrate understanding of plan of care, disease process/condition, diagnostic tests and medications  Outcome: Progressing  Note: White board updated with POC and care team information upon arrival to unit     Problem: Fall Risk  Goal: Patient will remain free from falls  Outcome: Progressing  Note: Fall precautions in place. Bed in lowest position. Non-skid socks in place. Personal possessions within reach. Mobility sign on door. Bed-alarm on. Call light within reach. Pt educated regarding fall prevention and states understanding.

## 2021-06-02 NOTE — PROGRESS NOTES
2 RN skin check complete with RICHARD Ontiveros.   Devices in place: glasses, silicone oxygen tubing with gray foam pads.  Skin assessed under devices: intact  Confirmed pressure ulcers found: NA  New potential pressure ulcers noted: NA   Wound consult placed: NA  The following interventions in place: Moisturizer, gray foams, glasses off during sleep    Bilateral ears pink/blanching  Bilateral elbows pink/blanching  Bruise to thoracic spine (outlined to monitor growth)  Scratches to right scapula  Sacral petechial rash  Bilateral heels dry/boggy/pink/blanching

## 2021-06-02 NOTE — ED PROVIDER NOTES
ED Provider Note    CHIEF COMPLAINT  Chief Complaint   Patient presents with   • Shortness of Breath   • Dizziness   • Atrial Fibrillation       HPI  Rajesh De Luna is a 75 y.o. male who presents complaining of shortness of breath and feeling dizzy.  The symptoms are worse this morning they are now improved.  He thought he should get checked out however.  The patient does not seem to seen a cardiologist in 4 years.  He does have a doctor but it sounds like he gets most of his care out of an urgent care as needed.  It is not clear which medicines he is taking.  He states he has multiple falls at home several which are empty.  He does recognize diltiazem is one of his medications.  He may be taking his Eliquis.  He is not sure whether his heart rate has been elevated recently.  Although he suspects that it is.  Is been no fever chills.  No chest pain.  No leg pain or swelling.  No recent cough or cold symptoms.  There is no other complaint.    PAST MEDICAL HISTORY  Past Medical History:   Diagnosis Date   • A-fib (HCC)    • Cancer (HCC) 2019    Lip cancer   • Hypertension        FAMILY HISTORY  History reviewed. No pertinent family history.    SOCIAL HISTORY  Social History     Tobacco Use   • Smoking status: Current Some Day Smoker   • Smokeless tobacco: Never Used   • Tobacco comment: Cigars occasionally   Vaping Use   • Vaping Use: Never used   Substance Use Topics   • Alcohol use: Yes     Alcohol/week: 1.8 oz     Types: 3 Cans of beer per week   • Drug use: Never         SURGICAL HISTORY  History reviewed. No pertinent surgical history.    CURRENT MEDICATIONS  Home Medications     Reviewed by Jasmina Coles R.N. (Registered Nurse) on 06/01/21 at 1913  Med List Status: Partial   Medication Last Dose Status   Apixaban (ELIQUIS PO)  Active   atorvastatin (LIPITOR) 20 MG Tab  Active   furosemide (LASIX) 20 MG Tab  Active   POTASSIUM CHLORIDE PO  Active   triamcinolone acetonide (KENALOG) 0.1 % Cream  Active     "            I have reviewed the nurses notes and/or the list brought with the patient.    ALLERGIES  Allergies   Allergen Reactions   • Penicillins Unspecified     Last dose  > 60 years ago         REVIEW OF SYSTEMS  See HPI for further details. Review of systems as above, otherwise all other systems are negative.     PHYSICAL EXAM  VITAL SIGNS: BP (!) 96/77   Pulse (!) 113   Temp 36.4 °C (97.5 °F) (Oral)   Resp 15   Ht 1.778 m (5' 10\")   Wt 83.9 kg (185 lb)   SpO2 92%   BMI 26.54 kg/m²     Constitutional: Well appearing patient in no acute distress.  Not toxic, nor ill in appearance.  HENT: Mucus membranes moist.  Oropharynx is clear.  Eyes: Pupils equally round.  No scleral icterus.   Neck: Full nontender range of motion.  Lymphatic: No cervical lymphadenopathy noted.   Cardiovascular: Fast, irregular 140s on the monitor, appears to be A. fib.  No murmurs, rubs, nor gallop appreciated.   Thorax & Lungs: Chest is nontender.  Lungs are notable for some basilar rales  Abdomen: Soft, with no tenderness, rebound nor guarding.  No mass, pulsatile mass, nor hepatosplenomegaly appreciated.  Skin: No purpura nor petechia noted.  Extremities/Musculoskeletal: No sign of trauma.  Calves are nontender with no cords nor edema.  No Michelle's sign.  Pulses are intact all around.   Neurologic: Alert & oriented.  Strength and sensation is intact all around.  Psychiatric: Normal affect appropriate for the clinical situation.    EKG  I interpreted this EKG myself.  This is a 12-lead study.  The rhythm is atrial fibrillation with a rate of 148.  There are no ST segment nor T wave abnormalities.  Interpretation: No ST segment elevation myocardial infarction.  Atrial fibrillation with RVR..    LABS  Labs Reviewed   CBC WITH DIFFERENTIAL - Abnormal; Notable for the following components:       Result Value    MCHC 32.8 (*)     RDW 51.8 (*)     Lymphocytes 21.10 (*)     All other components within normal limits   COMP METABOLIC PANEL " - Abnormal; Notable for the following components:    Co2 17 (*)     Glucose 100 (*)     Alkaline Phosphatase 164 (*)     All other components within normal limits   TROPONIN - Abnormal; Notable for the following components:    Troponin T 28 (*)     All other components within normal limits   PROBRAIN NATRIURETIC PEPTIDE, NT - Abnormal; Notable for the following components:    NT-proBNP 4516 (*)     All other components within normal limits   ESTIMATED GFR   SARS-COV-2, PCR (IN-HOUSE)         RADIOLOGY/PROCEDURES  I have reviewed the patient's film interpretations myself, and they are read out by the radiologist as:   DX-CHEST-PORTABLE (1 VIEW)   Final Result      1.  Hypoinflation and mild pulmonary edema.   2.  No pneumonia or pneumothorax.        .    MEDICAL RECORD  I have reviewed patient's medical record and pertinent results are listed above.    COURSE & MEDICAL DECISION MAKING  I have reviewed any medical record information, laboratory studies and radiographic results as noted above.  This patient presents with some shortness of breath.  He has a history of atrial fibrillation.  He is not taking all of his medications.  It sounds like in particular has not been taking his Cardizem.  He may or may not be on Eliquis for anticoagulation.  This point is not particularly symptomatic.  He was given a dose of diltiazem intravenously which brought his pulse rate down in the 100s.  Blood pressure is well is in the 100-1 teens range.  This point to give him a dose of Lasix help with his volume overload.  His laboratories do return showing elevated BNP, troponin just outside the normal range.  Chest x-ray as noted does show some mild edema.  I have a low suspicion for COVID-19 although I did send off a swab for this.  At this point like to put him in the hospital for ongoing rate control, diuresis, cardiac work-up.  It sounds like he has not seen a cardiologist in over 4 years, he does not recall when his last  echocardiogram was, it sounds like it is been quite some time.  Case was discussed with Dr. Valenzuela who will be admitting.  Patient and I talked about take his medicines as prescribed, not running out, he intends to rectify this going forward.    FINAL IMPRESSION  1. Atrial fibrillation with RVR (HCC)    2. Acute pulmonary edema (HCC)    3. History of medication noncompliance           This dictation was created using voice recognition software.    Electronically signed by: Venu White M.D., 6/1/2021 8:22 PM

## 2021-06-02 NOTE — DISCHARGE PLANNING
Anticipated Discharge Disposition: Home    Action: per chart review, pt pending clearance from cardiology. Echo pending.    Barriers to Discharge: Cardiology Clearance    Plan: f/u with medical team, pt

## 2021-06-02 NOTE — PROGRESS NOTES
Bedside report received. Patient A&O x4. 1L NC. Pt A fib on the monitor. POC discussed with patient. Patient verbalized understanding. Call light and belongings within reach. Bed locked and in lowest position, alarm and fall precautions in place.

## 2021-06-02 NOTE — H&P
Hospital Medicine History & Physical Note    Date of Service  6/1/2021    Primary Care Physician  Pcp Pt States None    Consultants  none    Code Status  Full Code    Chief Complaint  Chief Complaint   Patient presents with   • Shortness of Breath   • Dizziness   • Atrial Fibrillation       History of Presenting Illness  75 y.o. male with past medical history of atrial fibrillation, essential hypertension who presented 6/1/2021 with shortness of breath for the past few days and getting worse over last day.  Associated with dyspnea on exertion.  Patient denies any chest pain, palpitation, orthopnea or lower extremity edema.  In the ER he was initially found to have A. fib with RVR with heart rate around 140s.  Currently his heart rate is about 100.  In addition he was found to have hypoxic respiratory failure requiring 2 L oxygen likely related to volume overload from rapid A. fib.  He will be admitted to telemetry for further management.    Review of Systems  Review of Systems   Constitutional: Negative for chills, fever and weight loss.   HENT: Negative for congestion and nosebleeds.    Eyes: Negative for blurred vision, pain, discharge and redness.   Respiratory: Positive for shortness of breath. Negative for cough, sputum production and stridor.    Cardiovascular: Negative for chest pain, palpitations and orthopnea.   Gastrointestinal: Negative for abdominal pain, diarrhea, heartburn, nausea and vomiting.   Genitourinary: Negative for dysuria, frequency and urgency.   Musculoskeletal: Negative for back pain, myalgias and neck pain.   Skin: Negative for itching and rash.   Neurological: Negative for dizziness, focal weakness, seizures and headaches.   Psychiatric/Behavioral: Negative for depression. The patient is not nervous/anxious and does not have insomnia.        Past Medical History   has a past medical history of A-fib (HCC), Cancer (HCC) (2019), and Hypertension.    Surgical History   has no past surgical  history on file.     Family History  family history is not on file.     Social History   reports that he has been smoking. He has never used smokeless tobacco. He reports current alcohol use of about 1.8 oz of alcohol per week. He reports that he does not use drugs.    Allergies  Allergies   Allergen Reactions   • Penicillins Unspecified     Last dose  > 60 years ago         Medications  Prior to Admission Medications   Prescriptions Last Dose Informant Patient Reported? Taking?   apixaban (ELIQUIS) 5mg Tab 6/1/2021 at 0900  Yes Yes   Sig: Take 5 mg by mouth 2 times a day.   atorvastatin (LIPITOR) 20 MG Tab 6/1/2021 at 0900  No No   Sig: Take 1 Tab by mouth every day.   furosemide (LASIX) 20 MG Tab 6/1/2021 at 0900  Yes Yes   Sig: Take 20 mg by mouth every day.   potassium chloride SA (KDUR) 20 MEQ Tab CR 6/1/2021 at 0900  Yes Yes   Sig: Take 20 mEq by mouth every day.   triamcinolone acetonide (KENALOG) 0.1 % Cream Not Taking at Unknown time  No No   Sig: Apply 1 Application to affected area(s) 2 times a day.   Patient not taking: Reported on 6/1/2021      Facility-Administered Medications: None       Physical Exam  Temp:  [36.4 °C (97.5 °F)] 36.4 °C (97.5 °F)  Pulse:  [102-143] 102  Resp:  [12-49] 12  BP: ()/(68-91) 101/68  SpO2:  [91 %-94 %] 94 %    Physical Exam  Vitals reviewed.   Constitutional:       General: He is not in acute distress.     Appearance: Normal appearance.   HENT:      Head: Normocephalic and atraumatic.      Nose: No congestion or rhinorrhea.   Eyes:      Extraocular Movements: Extraocular movements intact.      Pupils: Pupils are equal, round, and reactive to light.   Cardiovascular:      Rate and Rhythm: Tachycardia present. Rhythm irregular.      Pulses: Normal pulses.   Pulmonary:      Effort: Pulmonary effort is normal. No respiratory distress.      Breath sounds: Normal breath sounds.   Abdominal:      General: Bowel sounds are normal. There is no distension.      Palpations:  Abdomen is soft.      Tenderness: There is no abdominal tenderness.   Musculoskeletal:         General: No swelling or tenderness.      Cervical back: Normal range of motion and neck supple.   Skin:     General: Skin is warm.      Findings: No erythema.   Neurological:      General: No focal deficit present.      Mental Status: He is alert and oriented to person, place, and time.         Laboratory:  Recent Labs     06/01/21 1930   WBC 7.5   RBC 4.78   HEMOGLOBIN 15.3   HEMATOCRIT 46.6   MCV 97.5   MCH 32.0   MCHC 32.8*   RDW 51.8*   PLATELETCT 200   MPV 11.0     Recent Labs     06/01/21 1930   SODIUM 140   POTASSIUM 4.7   CHLORIDE 110   CO2 17*   GLUCOSE 100*   BUN 19   CREATININE 1.03   CALCIUM 8.8     Recent Labs     06/01/21 1930   ALTSGPT 22   ASTSGOT 21   ALKPHOSPHAT 164*   TBILIRUBIN 1.1   GLUCOSE 100*         Recent Labs     06/01/21 1930   NTPROBNP 4516*         Recent Labs     06/01/21 1930   TROPONINT 28*       Imaging:  DX-CHEST-PORTABLE (1 VIEW)   Final Result      1.  Hypoinflation and mild pulmonary edema.   2.  No pneumonia or pneumothorax.            Assessment/Plan:  I anticipate this patient will require at least two midnights for appropriate medical management, necessitating inpatient admission.    Acute respiratory failure with hypoxia (HCC)- (present on admission)  Assessment & Plan  Likely related to A. fib with rapid rate causing volume overload  Started on IV Lasix 40 mg twice daily  Adjust medication as needed  Check echocardiogram    Shortness of breath  Assessment & Plan  Likely related to pulmonary edema  Plan as above    Elevated troponin- (present on admission)  Assessment & Plan  Likely related to demand ischemia    Atrial fibrillation with rapid ventricular response (HCC)- (present on admission)  Assessment & Plan  Patient is not on rate control drugs  Started the patient on metoprolol 25 mg twice daily  IV as needed metoprolol with parameters  Check echocardiogram  Adjust  medication as needed  Continue Eliquis

## 2021-06-02 NOTE — CARE PLAN
Problem: Knowledge Deficit - Standard  Goal: Patient and family/care givers will demonstrate understanding of plan of care, disease process/condition, diagnostic tests and medications  Outcome: Progressing  Note: Discussed POC and medications with patient. Pt is being diuresed.  Echocardiogram is ordered. Pt verbalized understanding.      Problem: Fall Risk  Goal: Patient will remain free from falls  Outcome: Progressing  Note: Bed locked and in lowest position. Bed alarm on. Treaded socks provided. Call light and belongings within reach.  Patient educated to call for assistance. Pt verbalized understanding. Hourly rounding in place.        The patient is Stable - Low risk of patient condition declining or worsening    Shift Goals  Clinical Goals: rate control, diurese  Patient Goals: rest    Progress made toward(s) clinical / shift goals:  yes

## 2021-06-02 NOTE — ASSESSMENT & PLAN NOTE
Likely related to A. fib with rapid rate causing volume overload  Started on IV Lasix 40 mg twice daily  Adjust medication as needed  Check echocardiogram

## 2021-06-02 NOTE — ED NOTES
Med rec updated and complete. Allergies reviewed.   Pt denies antibiotic use in last 14 days. Pt reports that he has been on  High blood pressure medications in the past. Unsure of names or strengths.  Pt has not taken high blood pressure medications in > 1 year    Home pharmacy  [facility] [place] [phone]

## 2021-06-02 NOTE — ED NOTES
Pt states dizziness & SOB about [id number]. States minor confusion during episode that has resolved now. Since then pt states work of breathing is more difficult & wanted to get checked out. Hx of afib, lip cancer.

## 2021-06-02 NOTE — ED TRIAGE NOTES
Pt BIB ems states was in shower earlier then had sudden SOB and dizziness. States he was unable to get a full breath w/o opening his mouth.

## 2021-06-03 PROBLEM — F10.931 ALCOHOL WITHDRAWAL DELIRIUM (HCC): Status: ACTIVE | Noted: 2021-06-03

## 2021-06-03 LAB — TSH SERPL DL<=0.005 MIU/L-ACNC: 3.34 UIU/ML (ref 0.38–5.33)

## 2021-06-03 PROCEDURE — 84443 ASSAY THYROID STIM HORMONE: CPT

## 2021-06-03 PROCEDURE — 700111 HCHG RX REV CODE 636 W/ 250 OVERRIDE (IP): Performed by: INTERNAL MEDICINE

## 2021-06-03 PROCEDURE — 700102 HCHG RX REV CODE 250 W/ 637 OVERRIDE(OP): Performed by: INTERNAL MEDICINE

## 2021-06-03 PROCEDURE — A9270 NON-COVERED ITEM OR SERVICE: HCPCS | Performed by: HOSPITALIST

## 2021-06-03 PROCEDURE — 700102 HCHG RX REV CODE 250 W/ 637 OVERRIDE(OP): Performed by: HOSPITALIST

## 2021-06-03 PROCEDURE — 700101 HCHG RX REV CODE 250: Performed by: HOSPITALIST

## 2021-06-03 PROCEDURE — HZ2ZZZZ DETOXIFICATION SERVICES FOR SUBSTANCE ABUSE TREATMENT: ICD-10-PCS | Performed by: HOSPITALIST

## 2021-06-03 PROCEDURE — 99233 SBSQ HOSP IP/OBS HIGH 50: CPT | Performed by: HOSPITALIST

## 2021-06-03 PROCEDURE — A9270 NON-COVERED ITEM OR SERVICE: HCPCS | Performed by: INTERNAL MEDICINE

## 2021-06-03 PROCEDURE — 700101 HCHG RX REV CODE 250: Performed by: INTERNAL MEDICINE

## 2021-06-03 PROCEDURE — 770020 HCHG ROOM/CARE - TELE (206)

## 2021-06-03 PROCEDURE — 700111 HCHG RX REV CODE 636 W/ 250 OVERRIDE (IP): Performed by: HOSPITALIST

## 2021-06-03 RX ORDER — LORAZEPAM 2 MG/ML
0.5 INJECTION INTRAMUSCULAR EVERY 4 HOURS PRN
Status: DISCONTINUED | OUTPATIENT
Start: 2021-06-03 | End: 2021-06-05 | Stop reason: HOSPADM

## 2021-06-03 RX ORDER — LORAZEPAM 2 MG/ML
2 INJECTION INTRAMUSCULAR EVERY 4 HOURS PRN
Status: DISCONTINUED | OUTPATIENT
Start: 2021-06-03 | End: 2021-06-05 | Stop reason: HOSPADM

## 2021-06-03 RX ORDER — GAUZE BANDAGE 2" X 2"
100 BANDAGE TOPICAL DAILY
Status: DISCONTINUED | OUTPATIENT
Start: 2021-06-04 | End: 2021-06-05 | Stop reason: HOSPADM

## 2021-06-03 RX ORDER — LORAZEPAM 2 MG/ML
1.5 INJECTION INTRAMUSCULAR
Status: DISCONTINUED | OUTPATIENT
Start: 2021-06-03 | End: 2021-06-05 | Stop reason: HOSPADM

## 2021-06-03 RX ORDER — LORAZEPAM 0.5 MG/1
0.5 TABLET ORAL EVERY 4 HOURS PRN
Status: DISCONTINUED | OUTPATIENT
Start: 2021-06-03 | End: 2021-06-05 | Stop reason: HOSPADM

## 2021-06-03 RX ORDER — LORAZEPAM 2 MG/1
4 TABLET ORAL
Status: DISCONTINUED | OUTPATIENT
Start: 2021-06-03 | End: 2021-06-05 | Stop reason: HOSPADM

## 2021-06-03 RX ORDER — LORAZEPAM 2 MG/ML
2 INJECTION INTRAMUSCULAR
Status: DISCONTINUED | OUTPATIENT
Start: 2021-06-03 | End: 2021-06-05 | Stop reason: HOSPADM

## 2021-06-03 RX ORDER — LORAZEPAM 2 MG/1
2 TABLET ORAL
Status: DISCONTINUED | OUTPATIENT
Start: 2021-06-03 | End: 2021-06-05 | Stop reason: HOSPADM

## 2021-06-03 RX ORDER — LORAZEPAM 2 MG/ML
1 INJECTION INTRAMUSCULAR
Status: DISCONTINUED | OUTPATIENT
Start: 2021-06-03 | End: 2021-06-05 | Stop reason: HOSPADM

## 2021-06-03 RX ORDER — LORAZEPAM 1 MG/1
1 TABLET ORAL EVERY 4 HOURS PRN
Status: DISCONTINUED | OUTPATIENT
Start: 2021-06-03 | End: 2021-06-05 | Stop reason: HOSPADM

## 2021-06-03 RX ORDER — FOLIC ACID 1 MG/1
1 TABLET ORAL DAILY
Status: DISCONTINUED | OUTPATIENT
Start: 2021-06-04 | End: 2021-06-05 | Stop reason: HOSPADM

## 2021-06-03 RX ADMIN — FUROSEMIDE 40 MG: 10 INJECTION, SOLUTION INTRAMUSCULAR; INTRAVENOUS at 17:02

## 2021-06-03 RX ADMIN — LORAZEPAM 1 MG: 2 INJECTION INTRAMUSCULAR; INTRAVENOUS at 23:41

## 2021-06-03 RX ADMIN — LORAZEPAM 2 MG: 2 INJECTION INTRAMUSCULAR; INTRAVENOUS at 04:16

## 2021-06-03 RX ADMIN — LORAZEPAM 0.5 MG: 2 INJECTION INTRAMUSCULAR; INTRAVENOUS at 20:57

## 2021-06-03 RX ADMIN — FUROSEMIDE 40 MG: 10 INJECTION, SOLUTION INTRAMUSCULAR; INTRAVENOUS at 06:06

## 2021-06-03 RX ADMIN — LORAZEPAM 0.5 MG: 2 INJECTION INTRAMUSCULAR; INTRAVENOUS at 17:02

## 2021-06-03 RX ADMIN — LORAZEPAM 1.5 MG: 2 INJECTION INTRAMUSCULAR; INTRAVENOUS at 19:29

## 2021-06-03 RX ADMIN — LORAZEPAM 0.5 MG: 2 INJECTION INTRAMUSCULAR; INTRAVENOUS at 11:48

## 2021-06-03 RX ADMIN — METOPROLOL TARTRATE 5 MG: 1 INJECTION, SOLUTION INTRAVENOUS at 14:31

## 2021-06-03 RX ADMIN — THIAMINE HYDROCHLORIDE: 100 INJECTION, SOLUTION INTRAMUSCULAR; INTRAVENOUS at 13:51

## 2021-06-03 RX ADMIN — LORAZEPAM 1 MG: 2 INJECTION INTRAMUSCULAR; INTRAVENOUS at 14:29

## 2021-06-03 ASSESSMENT — LIFESTYLE VARIABLES
ANXIETY: MILDLY ANXIOUS
HEADACHE, FULLNESS IN HEAD: VERY MILD
AGITATION: MODERATELY FIDGETY AND RESTLESS
NAUSEA AND VOMITING: NO NAUSEA AND NO VOMITING
ANXIETY: MODERATELY ANXIOUS OR GUARDED, SO ANXIETY IS INFERRED
PAROXYSMAL SWEATS: NO SWEAT VISIBLE
TREMOR: NO TREMOR
HEADACHE, FULLNESS IN HEAD: NOT PRESENT
PAROXYSMAL SWEATS: *
NAUSEA AND VOMITING: NO NAUSEA AND NO VOMITING
AUDITORY DISTURBANCES: MODERATE HARSHNESS OR ABILITY TO FRIGHTEN
ANXIETY: *
ORIENTATION AND CLOUDING OF SENSORIUM: DISORIENTED FOR PLACE AND / OR PERSON
VISUAL DISTURBANCES: NOT PRESENT
ORIENTATION AND CLOUDING OF SENSORIUM: DISORIENTED FOR PLACE AND / OR PERSON
AGITATION: SOMEWHAT MORE THAN NORMAL ACTIVITY
AUDITORY DISTURBANCES: VERY MILD HARSHNESS OR ABILITY TO FRIGHTEN
ANXIETY: MILDLY ANXIOUS
TREMOR: *
TOTAL SCORE: 12
AGITATION: MODERATELY FIDGETY AND RESTLESS
TOTAL SCORE: 16
NAUSEA AND VOMITING: NO NAUSEA AND NO VOMITING
TREMOR: NO TREMOR
AUDITORY DISTURBANCES: VERY MILD HARSHNESS OR ABILITY TO FRIGHTEN
PAROXYSMAL SWEATS: *
PAROXYSMAL SWEATS: BARELY PERCEPTIBLE SWEATING. PALMS MOIST
ANXIETY: NO ANXIETY (AT EASE)
AGITATION: *
VISUAL DISTURBANCES: NOT PRESENT
TOTAL SCORE: 12
AUDITORY DISTURBANCES: NOT PRESENT
TOTAL SCORE: 8
ANXIETY: *
TREMOR: NO TREMOR
NAUSEA AND VOMITING: NO NAUSEA AND NO VOMITING
PAROXYSMAL SWEATS: BARELY PERCEPTIBLE SWEATING. PALMS MOIST
HEADACHE, FULLNESS IN HEAD: NOT PRESENT
ORIENTATION AND CLOUDING OF SENSORIUM: CANNOT DO SERIAL ADDITIONS OR IS UNCERTAIN ABOUT DATE
AUDITORY DISTURBANCES: NOT PRESENT
VISUAL DISTURBANCES: NOT PRESENT
AGITATION: MODERATELY FIDGETY AND RESTLESS
VISUAL DISTURBANCES: NOT PRESENT
NAUSEA AND VOMITING: NO NAUSEA AND NO VOMITING
TREMOR: TREMOR NOT VISIBLE BUT CAN BE FELT, FINGERTIP TO FINGERTIP
TOTAL SCORE: 10
AGITATION: *
VISUAL DISTURBANCES: NOT PRESENT
HEADACHE, FULLNESS IN HEAD: NOT PRESENT
HEADACHE, FULLNESS IN HEAD: NOT PRESENT
NAUSEA AND VOMITING: NO NAUSEA AND NO VOMITING
PAROXYSMAL SWEATS: *
TREMOR: TREMOR NOT VISIBLE BUT CAN BE FELT, FINGERTIP TO FINGERTIP
VISUAL DISTURBANCES: NOT PRESENT
AUDITORY DISTURBANCES: NOT PRESENT
ORIENTATION AND CLOUDING OF SENSORIUM: DISORIENTED FOR PLACE AND / OR PERSON
ORIENTATION AND CLOUDING OF SENSORIUM: DISORIENTED FOR PLACE AND / OR PERSON
HEADACHE, FULLNESS IN HEAD: NOT PRESENT
TOTAL SCORE: 9
ORIENTATION AND CLOUDING OF SENSORIUM: DISORIENTED FOR PLACE AND / OR PERSON

## 2021-06-03 ASSESSMENT — FIBROSIS 4 INDEX: FIB4 SCORE: 1.39

## 2021-06-03 NOTE — PROGRESS NOTES
Pt awoke and began walking around room, pt told to return to bed. Attempted to reapply pulse ox and nasal cannula, but pt began to become noncompliant and irritable. AOx1 orientated to time. PEARLs. Contact made to MD Mejia about updates. Discussion about sundown being a possibility, and to continue less stimulating environment and to monitor.

## 2021-06-03 NOTE — PROGRESS NOTES
Pt became verbally aggressive and attempted to become physically aggressive toward staff. Pt up from bed and walked to other side of room, going against commands. Staff assist pushed, security called. MD Mejia updated, Ativan IV and 4 point soft restraint ordered. Pt told to return to bed, restraints applied and IV Ativan given. Pt moved to solo room, con pulse ox applied and continued monitoring.

## 2021-06-03 NOTE — PROGRESS NOTES
[facility] Medicine Daily Progress Note    Date of Service  6/3/2021    Chief Complaint  75 y.o. male admitted 6/1/2021 with dyspnea    Hospital Course  No notes on file    Interval Problem Update  No acute overnight events, though patient did become quite restless and agitated consistent with alcohol withdrawal, he required two-point restraints overnight.  A CIWA protocol was initiated on hospital day 2.    Consultants/Specialty  None    Code Status  Full Code    Disposition  Likely to home in 24-48 hours.     Review of Systems  All systems reviewed and negative except as noted per above.    Physical Exam  Temp:  [35.8 °C (96.5 °F)-36.4 °C (97.6 °F)] 36.4 °C (97.6 °F)  Pulse:  [113-150] 123  Resp:  [18-20] 18  BP: (102-118)/(75-88) 106/75  SpO2:  [90 %-96 %] 96 %    General: No acute distress  HEENT atraumatic, normocephalic, pupils equal round reactive to light  Neck: No JVD  Chest: Respirations are unlabored  Cardiac: Physiologic S1 and S2  Abdomen: Soft, nontender, nondistended  Extremities: Without clubbing, cyanosis or edema  Neuro: Cranial nerves II through XII are grossly intact.  Psych: No anxiety, judgement intact.        Current Facility-Administered Medications:   •  LORazepam (ATIVAN) injection 2 mg, 2 mg, Intravenous, Q4HRS PRN, Hardik Mejia M.D., 2 mg at 06/03/21 0416  •  LORazepam (ATIVAN) tablet 0.5 mg, 0.5 mg, Oral, Q4HRS PRN, Ryan Cox M.D.  •  LORazepam (ATIVAN) tablet 1 mg, 1 mg, Oral, Q4HRS PRN **OR** LORazepam (ATIVAN) injection 0.5 mg, 0.5 mg, Intravenous, Q4HRS PRN, Ryan Cox M.D., 0.5 mg at 06/03/21 1148  •  LORazepam (ATIVAN) tablet 2 mg, 2 mg, Oral, Q2HRS PRN **OR** LORazepam (ATIVAN) injection 1 mg, 1 mg, Intravenous, Q2HRS PRN, Ryan Cox M.D., 1 mg at 06/03/21 1429  •  LORazepam (ATIVAN) tablet 3 mg, 3 mg, Oral, Q HOUR PRN **OR** LORazepam (ATIVAN) injection 1.5 mg, 1.5 mg, Intravenous, Q HOUR PRN, Ryan Cox M.D.  •  LORazepam (ATIVAN) tablet 4 mg, 4 mg, Oral,  Q15 MIN PRN **OR** LORazepam (ATIVAN) injection 2 mg, 2 mg, Intravenous, Q15 MIN PRN, Ryan Cox M.D.  •  [COMPLETED] detox IV 1000 mL (D5LR + magnesium 1 g + thiamine 100 mg + folic acid 1 mg) infusion, , Intravenous, Once, Last Rate: 250 mL/hr at 06/03/21 1351, New Bag at 06/03/21 1351 **AND** [START ON 6/4/2021] thiamine (Vitamin B-1) tablet 100 mg, 100 mg, Oral, DAILY **AND** [START ON 6/4/2021] multivitamin (THERAGRAN) tablet 1 tablet, 1 tablet, Oral, DAILY **AND** [START ON 6/4/2021] folic acid (FOLVITE) tablet 1 mg, 1 mg, Oral, DAILY, Ryan Cox M.D.  •  metoprolol tartrate (LOPRESSOR) tablet 50 mg, 50 mg, Oral, TWICE DAILY, Ryan Cox M.D., 50 mg at 06/02/21 1709  •  apixaban (ELIQUIS) tablet 5 mg, 5 mg, Oral, BID, Daron Valenzuela M.D., 5 mg at 06/02/21 1709  •  atorvastatin (LIPITOR) tablet 20 mg, 20 mg, Oral, DAILY, Daron Valenzuela M.D., 20 mg at 06/02/21 0527  •  senna-docusate (PERICOLACE or SENOKOT S) 8.6-50 MG per tablet 2 tablet, 2 tablet, Oral, BID, 2 tablet at 06/02/21 1709 **AND** polyethylene glycol/lytes (MIRALAX) PACKET 1 Packet, 1 Packet, Oral, QDAY PRN **AND** magnesium hydroxide (MILK OF MAGNESIA) suspension 30 mL, 30 mL, Oral, QDAY PRN **AND** bisacodyl (DULCOLAX) suppository 10 mg, 10 mg, Rectal, QDAY PRN, Daron Valenzuela M.D.  •  ondansetron (ZOFRAN) syringe/vial injection 4 mg, 4 mg, Intravenous, Q4HRS PRN, Daron Valenzuela M.D.  •  ondansetron (ZOFRAN ODT) dispertab 4 mg, 4 mg, Oral, Q4HRS PRN, Daron Valenzuela M.D.  •  acetaminophen (Tylenol) tablet 650 mg, 650 mg, Oral, Q6HRS PRN, Daron Valenzuela M.D., 650 mg at 06/02/21 2137  •  furosemide (LASIX) injection 40 mg, 40 mg, Intravenous, BID DIURETIC, Daron Valenzuela M.D., 40 mg at 06/03/21 0606  •  potassium chloride SA (Kdur) tablet 20 mEq, 20 mEq, Oral, BID, Daron Valenzuela M.D., 20 mEq at 06/02/21 1709  •  Metoprolol Tartrate (LOPRESSOR) injection 5 mg, 5 mg, Intravenous, Q5 MIN PRN, Daron Valenzuela M.D., 5 mg at 06/03/21 1431      Fluids  No intake or  output data in the 24 hours ending 06/03/21 1511    Laboratory  Recent Labs     06/01/21  1930 06/02/21  0116   WBC 7.5 7.6   RBC 4.78 4.86   HEMOGLOBIN 15.3 15.4   HEMATOCRIT 46.6 47.7   MCV 97.5 98.1*   MCH 32.0 31.7   MCHC 32.8* 32.3*   RDW 51.8* 52.4*   PLATELETCT 200 208   MPV 11.0 11.3     Recent Labs     06/01/21  1930 06/02/21  0116   SODIUM 140 141   POTASSIUM 4.7 4.6   CHLORIDE 110 108   CO2 17* 22   GLUCOSE 100* 99   BUN 19 18   CREATININE 1.03 1.17   CALCIUM 8.8 9.4                   Imaging  EC-ECHOCARDIOGRAM COMPLETE W/ CONT   Final Result   Echocardiography Laboratory     CONCLUSIONS  Severely reduced left ventricular systolic function.  Global hypokinesis.  Reduced right ventricular systolic function.  Severely dilated left atrium - prominence of the posterior LA wall- possible thrombus/mass lesion- consider further evaluation with PORTILLO or CT/MRI  Mild mitral regurgitation.  LVEF 25%.      DX-CHEST-PORTABLE (1 VIEW)   Final Result      1.  Hypoinflation and mild pulmonary edema.   2.  No pneumonia or pneumothorax.           Assessment/Plan  Alcohol withdrawal delirium (HCC)  Assessment & Plan  Benzos per protocol, thiamine, folate, MVI.    Shortness of breath  Assessment & Plan  Likely related to pulmonary edema  Plan as above    Elevated troponin- (present on admission)  Assessment & Plan  Likely related to demand ischemia    Acute respiratory failure with hypoxia (HCC)- (present on admission)  Assessment & Plan  Likely related to A. fib with rapid rate causing volume overload  Started on IV Lasix 40 mg twice daily  Adjust medication as needed  Check echocardiogram    Atrial fibrillation with rapid ventricular response (HCC)- (present on admission)  Assessment & Plan  Patient is not on rate control drugs  Started the patient on metoprolol 25 mg twice daily on admit.   Increased to 50mg BID on HD#1.  CTM  IV as needed metoprolol with parameters  Echo results per above.   Adjust medication as  needed  Continue Eliquis       VTE prophylaxis: Continue Eliquis for atrial fibrillation.

## 2021-06-03 NOTE — PROGRESS NOTES
MONITOR SUMMARY    .--/.07/.--     A. Fib 104-138    Ectopy: Rare PVC, Tachycardic up to 140s-150s with ambulation

## 2021-06-03 NOTE — CARE PLAN
"The patient is Watcher - Medium risk of patient condition declining or worsening    Shift Goals  Clinical Goals: rate control, diurese  Patient Goals: rest    Progress made toward(s) clinical / shift goals:  Pt has increased anxiety, stating \"feels like he is going to die\", asymptomatic and stable. Pt's anxiety has been increased during this stay and was given 1xdose. Further POC for anxiety may be needed for DC.    Patient is not progressing towards the following goals:      Problem: Psychosocial  Goal: Patient's level of anxiety will decrease  Outcome: Not Progressing  Goal: Patient's ability to verbalize feelings about condition will improve  Outcome: Not Progressing  Goal: Patient and family will demonstrate ability to cope with life altering diagnosis and/or procedure  Outcome: Not Progressing     "

## 2021-06-03 NOTE — PROGRESS NOTES
Received evening report from day RN. Pt is comfortable, and asking for extra blankets. Bed is locked in low position with call light and belongings within reach. POC discussed and all questions answered. All needs and requirements met at this time.

## 2021-06-04 LAB
MAGNESIUM SERPL-MCNC: 2.4 MG/DL (ref 1.5–2.5)
PHOSPHATE SERPL-MCNC: 4 MG/DL (ref 2.5–4.5)

## 2021-06-04 PROCEDURE — 700102 HCHG RX REV CODE 250 W/ 637 OVERRIDE(OP): Performed by: HOSPITALIST

## 2021-06-04 PROCEDURE — 700111 HCHG RX REV CODE 636 W/ 250 OVERRIDE (IP): Performed by: HOSPITALIST

## 2021-06-04 PROCEDURE — 83735 ASSAY OF MAGNESIUM: CPT

## 2021-06-04 PROCEDURE — A9270 NON-COVERED ITEM OR SERVICE: HCPCS | Performed by: HOSPITALIST

## 2021-06-04 PROCEDURE — 99233 SBSQ HOSP IP/OBS HIGH 50: CPT | Performed by: HOSPITALIST

## 2021-06-04 PROCEDURE — 770020 HCHG ROOM/CARE - TELE (206)

## 2021-06-04 PROCEDURE — 36415 COLL VENOUS BLD VENIPUNCTURE: CPT

## 2021-06-04 PROCEDURE — 84100 ASSAY OF PHOSPHORUS: CPT

## 2021-06-04 PROCEDURE — A9270 NON-COVERED ITEM OR SERVICE: HCPCS | Performed by: INTERNAL MEDICINE

## 2021-06-04 PROCEDURE — 700111 HCHG RX REV CODE 636 W/ 250 OVERRIDE (IP): Performed by: INTERNAL MEDICINE

## 2021-06-04 PROCEDURE — 700102 HCHG RX REV CODE 250 W/ 637 OVERRIDE(OP): Performed by: INTERNAL MEDICINE

## 2021-06-04 RX ORDER — METOPROLOL TARTRATE 50 MG/1
100 TABLET, FILM COATED ORAL TWICE DAILY
Status: DISCONTINUED | OUTPATIENT
Start: 2021-06-04 | End: 2021-06-05 | Stop reason: HOSPADM

## 2021-06-04 RX ADMIN — METOPROLOL TARTRATE 100 MG: 50 TABLET, FILM COATED ORAL at 17:03

## 2021-06-04 RX ADMIN — Medication 100 MG: at 05:27

## 2021-06-04 RX ADMIN — FUROSEMIDE 40 MG: 10 INJECTION, SOLUTION INTRAMUSCULAR; INTRAVENOUS at 05:28

## 2021-06-04 RX ADMIN — APIXABAN 5 MG: 5 TABLET, FILM COATED ORAL at 05:23

## 2021-06-04 RX ADMIN — ATORVASTATIN CALCIUM 20 MG: 20 TABLET, FILM COATED ORAL at 05:22

## 2021-06-04 RX ADMIN — FOLIC ACID 1 MG: 1 TABLET ORAL at 05:24

## 2021-06-04 RX ADMIN — FUROSEMIDE 40 MG: 10 INJECTION, SOLUTION INTRAMUSCULAR; INTRAVENOUS at 16:27

## 2021-06-04 RX ADMIN — APIXABAN 5 MG: 5 TABLET, FILM COATED ORAL at 17:03

## 2021-06-04 RX ADMIN — POTASSIUM CHLORIDE 20 MEQ: 1500 TABLET, EXTENDED RELEASE ORAL at 17:03

## 2021-06-04 RX ADMIN — LORAZEPAM 1 MG: 2 INJECTION INTRAMUSCULAR; INTRAVENOUS at 05:28

## 2021-06-04 RX ADMIN — METOPROLOL TARTRATE 50 MG: 50 TABLET, FILM COATED ORAL at 05:28

## 2021-06-04 RX ADMIN — LORAZEPAM 1 MG: 2 INJECTION INTRAMUSCULAR; INTRAVENOUS at 01:28

## 2021-06-04 RX ADMIN — LORAZEPAM 1 MG: 2 INJECTION INTRAMUSCULAR; INTRAVENOUS at 07:00

## 2021-06-04 ASSESSMENT — LIFESTYLE VARIABLES
HEADACHE, FULLNESS IN HEAD: VERY MILD
VISUAL DISTURBANCES: NOT PRESENT
PAROXYSMAL SWEATS: NO SWEAT VISIBLE
HEADACHE, FULLNESS IN HEAD: NOT PRESENT
TOTAL SCORE: 4
ANXIETY: *
NAUSEA AND VOMITING: NO NAUSEA AND NO VOMITING
AUDITORY DISTURBANCES: NOT PRESENT
TREMOR: TREMOR NOT VISIBLE BUT CAN BE FELT, FINGERTIP TO FINGERTIP
VISUAL DISTURBANCES: NOT PRESENT
ORIENTATION AND CLOUDING OF SENSORIUM: DISORIENTED FOR PLACE AND / OR PERSON
TOTAL SCORE: 13
AUDITORY DISTURBANCES: VERY MILD HARSHNESS OR ABILITY TO FRIGHTEN
TREMOR: *
PAROXYSMAL SWEATS: *
ORIENTATION AND CLOUDING OF SENSORIUM: CANNOT DO SERIAL ADDITIONS OR IS UNCERTAIN ABOUT DATE
VISUAL DISTURBANCES: NOT PRESENT
AUDITORY DISTURBANCES: NOT PRESENT
ANXIETY: *
ANXIETY: NO ANXIETY (AT EASE)
TREMOR: NO TREMOR
PAROXYSMAL SWEATS: BARELY PERCEPTIBLE SWEATING. PALMS MOIST
HEADACHE, FULLNESS IN HEAD: NOT PRESENT
ORIENTATION AND CLOUDING OF SENSORIUM: DISORIENTED FOR PLACE AND / OR PERSON
ANXIETY: *
TREMOR: TREMOR NOT VISIBLE BUT CAN BE FELT, FINGERTIP TO FINGERTIP
ORIENTATION AND CLOUDING OF SENSORIUM: ORIENTED AND CAN DO SERIAL ADDITIONS
VISUAL DISTURBANCES: NOT PRESENT
NAUSEA AND VOMITING: NO NAUSEA AND NO VOMITING
HEADACHE, FULLNESS IN HEAD: NOT PRESENT
NAUSEA AND VOMITING: NO NAUSEA AND NO VOMITING
TREMOR: TREMOR NOT VISIBLE BUT CAN BE FELT, FINGERTIP TO FINGERTIP
ANXIETY: NO ANXIETY (AT EASE)
TOTAL SCORE: 13
AGITATION: NORMAL ACTIVITY
AGITATION: *
NAUSEA AND VOMITING: NO NAUSEA AND NO VOMITING
PAROXYSMAL SWEATS: *
ORIENTATION AND CLOUDING OF SENSORIUM: DISORIENTED FOR PLACE AND / OR PERSON
AGITATION: NORMAL ACTIVITY
TOTAL SCORE: 12
PAROXYSMAL SWEATS: *
AUDITORY DISTURBANCES: MILD HARSHNESS OR ABILITY TO FRIGHTEN
HEADACHE, FULLNESS IN HEAD: NOT PRESENT
VISUAL DISTURBANCES: NOT PRESENT
AGITATION: *
NAUSEA AND VOMITING: NO NAUSEA AND NO VOMITING
AGITATION: *
TOTAL SCORE: 0
AUDITORY DISTURBANCES: VERY MILD HARSHNESS OR ABILITY TO FRIGHTEN

## 2021-06-04 ASSESSMENT — PAIN DESCRIPTION - PAIN TYPE: TYPE: ACUTE PAIN

## 2021-06-04 ASSESSMENT — FIBROSIS 4 INDEX: FIB4 SCORE: 1.39

## 2021-06-04 NOTE — PROGRESS NOTES
"Pt took half of his morning medications and then proceeded to scream \"get out of my face\". Pt refusing to continue with meds or swallow. Refused rest of morning meds in chart.   "

## 2021-06-04 NOTE — CARE PLAN
Problem: Care Map:  Admission Optimal Outcome for the Heart Failure Patient  Goal: Admission:  Optimal Care of the heart failure patient  Outcome: Progressing     Problem: Care Map:  Day 1 Optimal Outcome for the Heart Failure Patient  Goal: Day 1:  Optimal Care of the heart failure patient  Outcome: Progressing     Problem: Care Map:  Day 2 Optimal Outcome for the Heart Failure Patient  Goal: Day 2:  Optimal Care of the heart failure patient  Outcome: Progressing     Problem: Care Map:  Day 3 Optimal Outcome for the Heart Failure Patient  Goal: Day 3:  Optimal Care of the heart failure patient  Outcome: Progressing     Problem: Care Map:  Day Before Discharge Optimal Outcome for the Heart Failure Patient  Goal: Day Before Discharge:  Optimal Care of the heart failure patient  Outcome: Progressing     Problem: Care Map:  Day of Discharge Optimal Outcome for the Heart Failure Patient  Goal: Day of Discharge:  Optimal Care of the heart failure patient  Outcome: Progressing     Problem: Knowledge Deficit - Standard  Goal: Patient and family/care givers will demonstrate understanding of plan of care, disease process/condition, diagnostic tests and medications  Outcome: Progressing     Problem: Fall Risk  Goal: Patient will remain free from falls  Outcome: Progressing     Problem: Pain - Standard  Goal: Alleviation of pain or a reduction in pain to the patient’s comfort goal  Outcome: Progressing     Problem: Psychosocial  Goal: Patient's level of anxiety will decrease  Outcome: Progressing  Goal: Patient's ability to verbalize feelings about condition will improve  Outcome: Progressing  Goal: Patient's ability to re-evaluate and adapt role responsibilities will improve  Outcome: Progressing  Goal: Patient and family will demonstrate ability to cope with life altering diagnosis and/or procedure  Outcome: Progressing  Goal: Spiritual and cultural needs incorporated into hospitalization  Outcome: Progressing     Problem:  Communication  Goal: The ability to communicate needs accurately and effectively will improve  Outcome: Progressing     Problem: Discharge Barriers/Planning  Goal: Patient's continuum of care needs are met  Outcome: Progressing     Problem: Safety - Medical Restraint  Goal: Remains free of injury from restraints (Restraint for Interference with Medical Device)  Outcome: Progressing  Goal: Free from restraint(s) (Restraint for Interference with Medical Device)  Outcome: Progressing     Problem: Optimal Care for Alcohol Withdrawal  Goal: Optimal Care for the alcohol withdrawal patient  Outcome: Progressing     Problem: Seizure Precautions  Goal: Implementation of seizure precautions  Outcome: Progressing     Problem: Lifestyle Changes  Goal: Patient's ability to identify lifestyle changes and available resources to help reduce recurrence of condition will improve  Outcome: Progressing     Problem: Psychosocial  Goal: Patient's level of anxiety will decrease  Outcome: Progressing  Goal: Spiritual and cultural needs incorporated into hospitalization  Outcome: Progressing     Problem: Risk for Aspiration  Goal: Patient's risk for aspiration will be absent or decrease  Outcome: Progressing     The patient is Stable - Low risk of patient condition declining or worsening    Shift Goals  Clinical Goals: safety, CIWA protocol  Patient Goals: rest    Progress made toward(s) clinical / shift goals:  Patient restraints discontinued, resting comfortably, compliant with care.

## 2021-06-04 NOTE — DOCUMENTATION QUERY
"                                                                         [facility]                                                                       Query Response Note      PATIENT:               JACOBO RAMIREZ  ACCT #:                  [id number]  MRN:                     [id number]  :                      1945  ADMIT DATE:       2021 6:43 PM  DISCH DATE:          RESPONDING  PROVIDER #:        [id number]           QUERY TEXT:    Pulmonary edema is documented in the H&P. Please further specify the chronicity and type:    NOTE:  If an appropriate response is not listed below, please respond with a new note.          The patient's Clinical Indicators include:  Troponin T: 28 and NT-proBNP: 4516 on admission with an increase in BNP to 4828 on 21. EKG on admission noted atrial fibrillation with a ventricular rate of 115-181. U/S Cardiac ECHO on 21 noted severely reduced left ventricular systolic function, global hypokinesis, reduced right ventricular systolic function, and an LVEF of 25%. \"Acute respiratory failure w/hypoxia likely r/t A. fibw/rapid rate causing volume overload\" and \"SOB likely r/t pulmonary edema\" are documented in the H&P.    Treatments include: Lasix, U/S Cardiac ECHO, CXR, and EKG.    Risk factors include: dx AFwRVR, dx Demand Ischemia, dx Fluid Overload, and Advanced Age.    Thank you,  Hiren Alcaraz RN, BSN  Clinical   Connect via [employer]  Options provided:   -- Acute pulmonary edema - cardiac related, (please specify type and acuity of CHF, if applicable, or other cardiac condition)   -- Acute Pulmonary Edema, Non-Cardiogenic   -- Chronic pulmonary edema - cardiac related, (please specify type and acuity of CHF, if applicable, or other cardiac condition)   -- Chronic Pulmonary Edema, Non-Cardiogenic   -- Unable to determine      Query created by: Hiren Alcaraz on 2021 8:31 AM    RESPONSE TEXT:    acute pulmonary edema-cardiac related " -          Electronically signed by:  RAUL HARGROVE MD 6/4/2021 2:34 PM

## 2021-06-04 NOTE — CARE PLAN
"The patient is Watcher - Medium risk of patient condition declining or worsening    Shift Goals  Clinical Goals: Safety, be able to remove restraints  Patient Goals: rest    Progress made toward(s) clinical / shift goals:  Restraints in place for safety, CIWA protocol in place and meds given as appropriate.    Pt verbally aggressive throughout shift, made statements such as \"When I get out of here you're the first one I'm going to shoot,\" and \"You're so stupid. I own this hospital, you're fired.\" CIWA protocol in place, score has been 8-12. Ativan effectively decreasing agitation and withdrawal sx.    Patient is not progressing towards the following goals:      Problem: Knowledge Deficit - Standard  Goal: Patient and family/care givers will demonstrate understanding of plan of care, disease process/condition, diagnostic tests and medications  Outcome: Not Progressing     Problem: Psychosocial  Goal: Patient's level of anxiety will decrease  Outcome: Not Progressing     Problem: Psychosocial  Goal: Patient's level of anxiety will decrease  Outcome: Not Progressing     "

## 2021-06-04 NOTE — PROGRESS NOTES
[facility] Medicine Daily Progress Note    Date of Service  6/4/2021    Chief Complaint  75 y.o. male admitted 6/1/2021 with dyspnea    Hospital Course  No notes on file    Interval Problem Update  No acute overnight events, though patient did become quite restless and agitated consistent with alcohol withdrawal, he required two-point restraints overnight.  A CIWA protocol was initiated on hospital day 2.  Restraints off on HD#3.      Consultants/Specialty  None    Code Status  Full Code    Disposition  Likely to home in 24-48 hours.     Review of Systems  Unable to obtain, sedated on CIWA protocol.     Physical Exam  Temp:  [35.8 °C (96.5 °F)-36.3 °C (97.3 °F)] 36.1 °C (97 °F)  Pulse:  [104-126] 126  Resp:  [16-18] 18  BP: (100-120)/(69-88) 101/85  SpO2:  [92 %-97 %] 97 %    General: No acute distress  HEENT atraumatic, normocephalic, pupils equal round reactive to light  Neck: No JVD  Chest: Respirations are unlabored  Cardiac: Physiologic S1 and S2  Abdomen: Soft, nontender, nondistended  Extremities: Without clubbing, cyanosis or edema  Neuro: Cranial nerves II through XII are grossly intact.  Psych: Unable to be assessed.        Current Facility-Administered Medications:   •  LORazepam (ATIVAN) injection 2 mg, 2 mg, Intravenous, Q4HRS PRN, Hardik Mejia M.D., 2 mg at 06/03/21 0416  •  LORazepam (ATIVAN) tablet 0.5 mg, 0.5 mg, Oral, Q4HRS PRN, Ryan Cox M.D.  •  LORazepam (ATIVAN) tablet 1 mg, 1 mg, Oral, Q4HRS PRN **OR** LORazepam (ATIVAN) injection 0.5 mg, 0.5 mg, Intravenous, Q4HRS PRN, Ryan Cox M.D., 0.5 mg at 06/03/21 2057  •  LORazepam (ATIVAN) tablet 2 mg, 2 mg, Oral, Q2HRS PRN **OR** LORazepam (ATIVAN) injection 1 mg, 1 mg, Intravenous, Q2HRS PRN, Ryan Cox M.D., 1 mg at 06/04/21 0700  •  LORazepam (ATIVAN) tablet 3 mg, 3 mg, Oral, Q HOUR PRN **OR** LORazepam (ATIVAN) injection 1.5 mg, 1.5 mg, Intravenous, Q HOUR PRN, Ryan Cox M.D., 1.5 mg at 06/03/21 1929  •  LORazepam (ATIVAN)  tablet 4 mg, 4 mg, Oral, Q15 MIN PRN **OR** LORazepam (ATIVAN) injection 2 mg, 2 mg, Intravenous, Q15 MIN PRN, Ryan Cox M.D.  •  [COMPLETED] detox IV 1000 mL (D5LR + magnesium 1 g + thiamine 100 mg + folic acid 1 mg) infusion, , Intravenous, Once, Last Rate: 250 mL/hr at 06/03/21 1351, New Bag at 06/03/21 1351 **AND** thiamine (Vitamin B-1) tablet 100 mg, 100 mg, Oral, DAILY, 100 mg at 06/04/21 0527 **AND** multivitamin (THERAGRAN) tablet 1 tablet, 1 tablet, Oral, DAILY **AND** folic acid (FOLVITE) tablet 1 mg, 1 mg, Oral, DAILY, Ryan Cox M.D., 1 mg at 06/04/21 0524  •  metoprolol tartrate (LOPRESSOR) tablet 50 mg, 50 mg, Oral, TWICE DAILY, Ryan Cox M.D., 50 mg at 06/04/21 0528  •  apixaban (ELIQUIS) tablet 5 mg, 5 mg, Oral, BID, Daron Valenzuela M.D., 5 mg at 06/04/21 0523  •  atorvastatin (LIPITOR) tablet 20 mg, 20 mg, Oral, DAILY, Daron Valenzuela M.D., 20 mg at 06/04/21 0522  •  senna-docusate (PERICOLACE or SENOKOT S) 8.6-50 MG per tablet 2 tablet, 2 tablet, Oral, BID, 2 tablet at 06/02/21 1709 **AND** polyethylene glycol/lytes (MIRALAX) PACKET 1 Packet, 1 Packet, Oral, QDAY PRN **AND** magnesium hydroxide (MILK OF MAGNESIA) suspension 30 mL, 30 mL, Oral, QDAY PRN **AND** bisacodyl (DULCOLAX) suppository 10 mg, 10 mg, Rectal, QDAY PRN, Daron Valenzuela M.D.  •  ondansetron (ZOFRAN) syringe/vial injection 4 mg, 4 mg, Intravenous, Q4HRS PRN, Daron Valenzuela M.D.  •  ondansetron (ZOFRAN ODT) dispertab 4 mg, 4 mg, Oral, Q4HRS PRN, Daron Valenzuela M.D.  •  acetaminophen (Tylenol) tablet 650 mg, 650 mg, Oral, Q6HRS PRN, Daron Valenzuela M.D., 650 mg at 06/02/21 2137  •  furosemide (LASIX) injection 40 mg, 40 mg, Intravenous, BID DIURETIC, Daron Valenzuela M.D., 40 mg at 06/04/21 0528  •  potassium chloride SA (Kdur) tablet 20 mEq, 20 mEq, Oral, BID, Daron Valenzuela M.D., 20 mEq at 06/02/21 1709  •  Metoprolol Tartrate (LOPRESSOR) injection 5 mg, 5 mg, Intravenous, Q5 MIN PRN, Daron Valenzuela M.D., 5 mg at 06/03/21  1431      Fluids    Intake/Output Summary (Last 24 hours) at 6/4/2021 1343  Last data filed at 6/4/2021 1221  Gross per 24 hour   Intake 240 ml   Output 1900 ml   Net -1660 ml       Laboratory  Recent Labs     06/01/21 1930 06/02/21  0116   WBC 7.5 7.6   RBC 4.78 4.86   HEMOGLOBIN 15.3 15.4   HEMATOCRIT 46.6 47.7   MCV 97.5 98.1*   MCH 32.0 31.7   MCHC 32.8* 32.3*   RDW 51.8* 52.4*   PLATELETCT 200 208   MPV 11.0 11.3     Recent Labs     06/01/21 1930 06/02/21  0116   SODIUM 140 141   POTASSIUM 4.7 4.6   CHLORIDE 110 108   CO2 17* 22   GLUCOSE 100* 99   BUN 19 18   CREATININE 1.03 1.17   CALCIUM 8.8 9.4                   Imaging  EC-ECHOCARDIOGRAM COMPLETE W/ CONT   Final Result   Echocardiography Laboratory     CONCLUSIONS  Severely reduced left ventricular systolic function.  Global hypokinesis.  Reduced right ventricular systolic function.  Severely dilated left atrium - prominence of the posterior LA wall- possible thrombus/mass lesion- consider further evaluation with PORTILLO or CT/MRI  Mild mitral regurgitation.  LVEF 25%.      DX-CHEST-PORTABLE (1 VIEW)   Final Result      1.  Hypoinflation and mild pulmonary edema.   2.  No pneumonia or pneumothorax.           Assessment/Plan  Alcohol withdrawal delirium (HCC)  Assessment & Plan  Benzos per protocol, thiamine, folate, MVI.    Shortness of breath  Assessment & Plan  Likely related to pulmonary edema  Plan as above    Elevated troponin- (present on admission)  Assessment & Plan  Likely related to demand ischemia    Acute respiratory failure with hypoxia (HCC)- (present on admission)  Assessment & Plan  Likely related to A. fib with rapid rate causing volume overload  Started on IV Lasix 40 mg twice daily  Adjust medication as needed  Check echocardiogram    Atrial fibrillation with rapid ventricular response (HCC)- (present on admission)  Assessment & Plan  Patient is not on rate control drugs  Started the patient on metoprolol 25 mg twice daily on admit.    Increased to 50mg BID on HD#1.  CTM  IV as needed metoprolol with parameters  Echo results per above.   Adjust medication as needed  Continue Eliquis       VTE prophylaxis: Continue Eliquis for atrial fibrillation.

## 2021-06-04 NOTE — PROGRESS NOTES
Bedside report received from day RN, pt care assumed, assessment completed. Pt is A&O2 to date and place but refuses to state name and does not know why he is at the hospital. Pt is talking to someone else in the room and stated he hears voices besides mine. Pain 0 per pt, Afib on the monitor. Updated on POC, questions answered. Bed in lowest, locked position, treaded socks on, call light and belongings within reach. Fall precautions in place. Pt in 4 pt restraints with 3 bed rails up.

## 2021-06-04 NOTE — PROGRESS NOTES
Monitor Summary per monitor tech:  Rate: 114-160  Rhythm: Afib  Ectopy: PVCs  Measurements: -/0.09/-

## 2021-06-04 NOTE — CARE PLAN
The patient is Watcher - Medium risk of patient condition declining or worsening    Shift Goals  Clinical Goals: safety, CIWA protocol  Patient Goals: rest    Progress made toward(s) clinical / shift goals:    CIWA protocol in place.     Patient is not progressing towards the following goals:      Problem: Psychosocial  Goal: Patient's level of anxiety will decrease  Outcome: Not Progressing   Pt still agitated and hallucinating. CIWA protocol in place.   Problem: Communication  Goal: The ability to communicate needs accurately and effectively will improve  Outcome: Not Progressing   Pt requires frequent reorienting. Pt oriented to time and place. Pt unable to communicate needs effectively. Q1hr checks in place.   Problem: Psychosocial  Goal: Patient's level of anxiety will decrease  Outcome: Not Progressing

## 2021-06-05 VITALS
WEIGHT: 180.56 LBS | TEMPERATURE: 97.9 F | DIASTOLIC BLOOD PRESSURE: 74 MMHG | OXYGEN SATURATION: 96 % | SYSTOLIC BLOOD PRESSURE: 103 MMHG | BODY MASS INDEX: 25.85 KG/M2 | HEART RATE: 97 BPM | HEIGHT: 70 IN | RESPIRATION RATE: 17 BRPM

## 2021-06-05 PROBLEM — J96.01 ACUTE RESPIRATORY FAILURE WITH HYPOXIA (HCC): Status: RESOLVED | Noted: 2021-06-01 | Resolved: 2021-06-05

## 2021-06-05 PROBLEM — R06.02 SHORTNESS OF BREATH: Status: RESOLVED | Noted: 2021-06-01 | Resolved: 2021-06-05

## 2021-06-05 PROBLEM — I48.91 ATRIAL FIBRILLATION WITH RAPID VENTRICULAR RESPONSE (HCC): Status: RESOLVED | Noted: 2021-06-01 | Resolved: 2021-06-05

## 2021-06-05 PROBLEM — R79.89 ELEVATED TROPONIN: Status: RESOLVED | Noted: 2021-06-01 | Resolved: 2021-06-05

## 2021-06-05 PROBLEM — F10.931 ALCOHOL WITHDRAWAL DELIRIUM (HCC): Status: RESOLVED | Noted: 2021-06-03 | Resolved: 2021-06-05

## 2021-06-05 LAB
ALBUMIN SERPL BCP-MCNC: 3.9 G/DL (ref 3.2–4.9)
ALBUMIN/GLOB SERPL: 1.3 G/DL
ALP SERPL-CCNC: 151 U/L (ref 30–99)
ALT SERPL-CCNC: 32 U/L (ref 2–50)
ANION GAP SERPL CALC-SCNC: 14 MMOL/L (ref 7–16)
AST SERPL-CCNC: 24 U/L (ref 12–45)
BILIRUB SERPL-MCNC: 1.3 MG/DL (ref 0.1–1.5)
BUN SERPL-MCNC: 30 MG/DL (ref 8–22)
CALCIUM SERPL-MCNC: 9 MG/DL (ref 8.5–10.5)
CHLORIDE SERPL-SCNC: 105 MMOL/L (ref 96–112)
CO2 SERPL-SCNC: 24 MMOL/L (ref 20–33)
CREAT SERPL-MCNC: 1.31 MG/DL (ref 0.5–1.4)
ERYTHROCYTE [DISTWIDTH] IN BLOOD BY AUTOMATED COUNT: 50.1 FL (ref 35.9–50)
GLOBULIN SER CALC-MCNC: 2.9 G/DL (ref 1.9–3.5)
GLUCOSE SERPL-MCNC: 98 MG/DL (ref 65–99)
HCT VFR BLD AUTO: 49 % (ref 42–52)
HGB BLD-MCNC: 16.6 G/DL (ref 14–18)
MCH RBC QN AUTO: 32.4 PG (ref 27–33)
MCHC RBC AUTO-ENTMCNC: 33.9 G/DL (ref 33.7–35.3)
MCV RBC AUTO: 95.7 FL (ref 81.4–97.8)
PLATELET # BLD AUTO: 207 K/UL (ref 164–446)
PMV BLD AUTO: 10.9 FL (ref 9–12.9)
POTASSIUM SERPL-SCNC: 4 MMOL/L (ref 3.6–5.5)
PROT SERPL-MCNC: 6.8 G/DL (ref 6–8.2)
RBC # BLD AUTO: 5.12 M/UL (ref 4.7–6.1)
SODIUM SERPL-SCNC: 143 MMOL/L (ref 135–145)
WBC # BLD AUTO: 8.2 K/UL (ref 4.8–10.8)

## 2021-06-05 PROCEDURE — 80053 COMPREHEN METABOLIC PANEL: CPT

## 2021-06-05 PROCEDURE — 700111 HCHG RX REV CODE 636 W/ 250 OVERRIDE (IP): Performed by: INTERNAL MEDICINE

## 2021-06-05 PROCEDURE — 700102 HCHG RX REV CODE 250 W/ 637 OVERRIDE(OP): Performed by: HOSPITALIST

## 2021-06-05 PROCEDURE — 99239 HOSP IP/OBS DSCHRG MGMT >30: CPT | Performed by: HOSPITALIST

## 2021-06-05 PROCEDURE — 700102 HCHG RX REV CODE 250 W/ 637 OVERRIDE(OP): Performed by: INTERNAL MEDICINE

## 2021-06-05 PROCEDURE — 36415 COLL VENOUS BLD VENIPUNCTURE: CPT

## 2021-06-05 PROCEDURE — A9270 NON-COVERED ITEM OR SERVICE: HCPCS | Performed by: HOSPITALIST

## 2021-06-05 PROCEDURE — A9270 NON-COVERED ITEM OR SERVICE: HCPCS | Performed by: INTERNAL MEDICINE

## 2021-06-05 PROCEDURE — 85027 COMPLETE CBC AUTOMATED: CPT

## 2021-06-05 RX ORDER — METOPROLOL TARTRATE 100 MG/1
100 TABLET ORAL 2 TIMES DAILY
Qty: 60 TABLET | Refills: 0 | Status: ON HOLD | OUTPATIENT
Start: 2021-06-05 | End: 2021-06-12

## 2021-06-05 RX ORDER — FUROSEMIDE 40 MG/1
40 TABLET ORAL DAILY
Qty: 30 TABLET | Refills: 0 | Status: SHIPPED | OUTPATIENT
Start: 2021-06-05 | End: 2021-06-18

## 2021-06-05 RX ADMIN — THERA TABS 1 TABLET: TAB at 04:36

## 2021-06-05 RX ADMIN — ONDANSETRON 4 MG: 4 TABLET, ORALLY DISINTEGRATING ORAL at 11:01

## 2021-06-05 RX ADMIN — APIXABAN 5 MG: 5 TABLET, FILM COATED ORAL at 04:36

## 2021-06-05 RX ADMIN — METOPROLOL TARTRATE 100 MG: 50 TABLET, FILM COATED ORAL at 04:36

## 2021-06-05 RX ADMIN — Medication 100 MG: at 04:36

## 2021-06-05 RX ADMIN — ATORVASTATIN CALCIUM 20 MG: 20 TABLET, FILM COATED ORAL at 04:36

## 2021-06-05 RX ADMIN — POTASSIUM CHLORIDE 20 MEQ: 1500 TABLET, EXTENDED RELEASE ORAL at 04:36

## 2021-06-05 RX ADMIN — FUROSEMIDE 40 MG: 10 INJECTION, SOLUTION INTRAMUSCULAR; INTRAVENOUS at 04:37

## 2021-06-05 RX ADMIN — FOLIC ACID 1 MG: 1 TABLET ORAL at 04:36

## 2021-06-05 ASSESSMENT — LIFESTYLE VARIABLES
TOTAL SCORE: 2
AGITATION: SOMEWHAT MORE THAN NORMAL ACTIVITY
VISUAL DISTURBANCES: NOT PRESENT
TREMOR: NO TREMOR
AUDITORY DISTURBANCES: NOT PRESENT
ANXIETY: MILDLY ANXIOUS
NAUSEA AND VOMITING: NO NAUSEA AND NO VOMITING
PAROXYSMAL SWEATS: NO SWEAT VISIBLE
ORIENTATION AND CLOUDING OF SENSORIUM: ORIENTED AND CAN DO SERIAL ADDITIONS
HEADACHE, FULLNESS IN HEAD: NOT PRESENT

## 2021-06-05 ASSESSMENT — PAIN DESCRIPTION - PAIN TYPE
TYPE: ACUTE PAIN
TYPE: ACUTE PAIN

## 2021-06-05 NOTE — PROGRESS NOTES
Assumed care at 0700. Received bedside report from RICHARD You. Patient resting in bed. Bed low and locked, call light within reach. Plan of care reviewed.

## 2021-06-05 NOTE — DISCHARGE SUMMARY
"Discharge Summary    CHIEF COMPLAINT ON ADMISSION  Chief Complaint   Patient presents with   • Shortness of Breath   • Dizziness   • Atrial Fibrillation       Reason for Admission  EMS G-[id number]     Admission Date  6/1/2021    CODE STATUS  Full Code    HPI & HOSPITAL COURSE  For full details of admission please see the H&P of Dr. Wade dated 6/1/2021, briefly, \"75 y.o. male with past medical history of atrial fibrillation, essential hypertension who presented 6/1/2021 with shortness of breath for the past few days and getting worse over last day.  Associated with dyspnea on exertion.  Patient denies any chest pain, palpitation, orthopnea or lower extremity edema.  In the ER he was initially found to have A. fib with RVR with heart rate around 140s.  Currently his heart rate is about 100.  In addition he was found to have hypoxic respiratory failure requiring 2 L oxygen likely related to volume overload from rapid A. fib.  He will be admitted to telemetry for further management.\"    Patient's heart rate was controlled with the aid increasing dose of beta-blockade, he did have some confusion and agitation on hospital day 2 for which a CIWA benzodiazepine protocol was initiated.  He had some lethargy and somnolence thereafter.  This was able to be rapidly down titrated.  Heart failure medications were adjusted, patient was no longer hypoxemic requiring supplemental O2, ambulatory and eager to be discharged home on 6/5/2021.    Therefore, he is discharged in good and stable condition to home with close outpatient follow-up.    The patient met 2-midnight criteria for an inpatient stay at the time of discharge.    Discharge Date  6/5/2021    FOLLOW UP ITEMS POST DISCHARGE  None    DISCHARGE DIAGNOSES  Active Problems:    * No active hospital problems. *  Resolved Problems:    Atrial fibrillation with rapid ventricular response (HCC) POA: Yes    Acute respiratory failure with hypoxia (HCC) POA: Yes    Elevated troponin POA: " Yes    Shortness of breath POA: Unknown    Alcohol withdrawal delirium (HCC) POA: Unknown      FOLLOW UP  Future Appointments   Date Time Provider Department Center   6/10/2021  8:45 AM SORAYA Martines [facility] None     SORAYA Martines  [address]  [address]  [place] [id number]  [phone]    Go on 6/10/2021  Appointment      MEDICATIONS ON DISCHARGE     Medication List      START taking these medications      Instructions   metoprolol tartrate 100 MG Tabs  Commonly known as: LOPRESSOR   Take 1 tablet by mouth 2 times a day.  Dose: 100 mg        CHANGE how you take these medications      Instructions   furosemide 40 MG Tabs  What changed:   · medication strength  · how much to take  Commonly known as: LASIX   Take 1 tablet by mouth every day for 30 days.  Dose: 40 mg        CONTINUE taking these medications      Instructions   atorvastatin 20 MG Tabs  Commonly known as: Lipitor   Take 1 Tab by mouth every day.  Dose: 20 mg     Eliquis 5mg Tabs  Generic drug: apixaban   Take 5 mg by mouth 2 times a day.  Dose: 5 mg     potassium chloride SA 20 MEQ Tbcr  Commonly known as: Kdur   Take 20 mEq by mouth every day.  Dose: 20 mEq     triamcinolone acetonide 0.1 % Crea  Commonly known as: KENALOG   Apply 1 Application to affected area(s) 2 times a day.  Dose: 1 Application            Allergies  Allergies   Allergen Reactions   • Penicillins Unspecified     Last dose  > 60 years ago         DIET  Orders Placed This Encounter   Procedures   • Diet Order Diet: 2 Gram Sodium     Standing Status:   Standing     Number of Occurrences:   1     Order Specific Question:   Diet:     Answer:   2 Gram Sodium [7]       ACTIVITY  As tolerated.  Weight bearing as tolerated    CONSULTATIONS  None    PROCEDURES  None    RADIOLOGY  EC-ECHOCARDIOGRAM COMPLETE W/ CONT   Final Result      DX-CHEST-PORTABLE (1 VIEW)   Final Result      1.  Hypoinflation and mild pulmonary edema.   2.  No pneumonia or pneumothorax.             LABORATORY  Lab Results   Component Value Date    SODIUM 143 06/05/2021    POTASSIUM 4.0 06/05/2021    CHLORIDE 105 06/05/2021    CO2 24 06/05/2021    GLUCOSE 98 06/05/2021    BUN 30 (H) 06/05/2021    CREATININE 1.31 06/05/2021        Lab Results   Component Value Date    WBC 8.2 06/05/2021    HEMOGLOBIN 16.6 06/05/2021    HEMATOCRIT 49.0 06/05/2021    PLATELETCT 207 06/05/2021        Total time of the discharge process exceeds 33 minutes.

## 2021-06-05 NOTE — DISCHARGE INSTRUCTIONS
Discharge Instructions    Discharged to home by car with friend. Discharged via wheelchair, hospital escort: Refused.  Special equipment needed: Not Applicable    Be sure to schedule a follow-up appointment with your primary care doctor or any specialists as instructed.     Discharge Plan:   Diet Plan: Discussed  Activity Level: Discussed  Confirmed Follow up Appointment: Appointment Scheduled  Confirmed Symptoms Management: Discussed  Medication Reconciliation Updated: Yes    I understand that a diet low in cholesterol, fat, and sodium is recommended for good health. Unless I have been given specific instructions below for another diet, I accept this instruction as my diet prescription.   Other diet: Heart Healthy    Special Instructions:     · Is patient discharged on Warfarin / Coumadin?   No     Depression / Suicide Risk    As you are discharged from this [facility] facility, it is important to learn how to keep safe from harming yourself.    Recognize the warning signs:  · Abrupt changes in personality, positive or negative- including increase in energy   · Giving away possessions  · Change in eating patterns- significant weight changes-  positive or negative  · Change in sleeping patterns- unable to sleep or sleeping all the time   · Unwillingness or inability to communicate  · Depression  · Unusual sadness, discouragement and loneliness  · Talk of wanting to die  · Neglect of personal appearance   · Rebelliousness- reckless behavior  · Withdrawal from people/activities they love  · Confusion- inability to concentrate     If you or a loved one observes any of these behaviors or has concerns about self-harm, here's what you can do:  · Talk about it- your feelings and reasons for harming yourself  · Remove any means that you might use to hurt yourself (examples: pills, rope, extension cords, firearm)  · Get professional help from the community (Mental Health, Substance Abuse, psychological counseling)  · Do  not be alone:Call your Safe Contact- someone whom you trust who will be there for you.  · Call your local CRISIS HOTLINE 238-1310 or 793-912-0193  · Call your local Children's Mobile Crisis Response Team [place] (941) 890-0804 or www.[employer]  · Call the toll free National Suicide Prevention Hotlines   · National Suicide Prevention Lifeline 619-303-XEVW (4558)  · National Video[employer] Line Network 800-SUICIDE (665-7524)

## 2021-06-05 NOTE — PROGRESS NOTES
Assumed care of patient at bedside report from dayshift RN. Updated on POC. Patient currently A & O x 4 on 2 L O2 nasal cannula, up in bed without complaints of acute pain. Call light within reach. Whiteboard updated. Fall precautions in place. Bed locked and in lowest position. All questions answered. No other needs indicated at this time.

## 2021-06-07 ENCOUNTER — TELEPHONE (OUTPATIENT)
Dept: CARDIOLOGY | Facility: MEDICAL CENTER | Age: 76
End: 2021-06-07

## 2021-06-08 NOTE — DOCUMENTATION QUERY
"                                                                         [facility]                                                                       Query Response Note      PATIENT:               JACOBO RAMIREZ  ACCT #:                  [id number]  MRN:                     [id number]  :                      1945  ADMIT DATE:       2021 6:43 PM  DISCH DATE:        2021 12:04 PM  RESPONDING  PROVIDER #:        [id number]           QUERY TEXT:    Thank you for your prior response of \"acute pulmonary edema - cardiac related\". Can the cardiac condition causing the pulmonary edema be further specified (includes probable or suspected)?    NOTE:  If an appropriate response is not listed below, please respond with a new note.    The patient's Clinical Indicators include:  \"Heart failure medications were adjusted\" is documented in the Discharge Summary. Troponin T: 28 and NT-proBNP: 4516 on admission with an increase in BNP to 4828 on 21. EKG on admission noted atrial fibrillation with a ventricular rate of 115-181. U/S Cardiac ECHO on 21 noted severely reduced left ventricular systolic function, global hypokinesis, reduced right ventricular systolic function, and an LVEF of 25%. \"Acute respiratory failure w/hypoxia likely r/t A. fibw/rapid rate causing volume overload\" and \"SOB likely r/t pulmonary edema\" are documented in the H&P.    Treatments include: Lasix, U/S Cardiac ECHO, CXR, and EKG.    Risk factors include: dx AFwRVR, dx Demand Ischemia, dx Fluid Overload, and Advanced Age.    Thank you,  Hiren Alcaraz RN, BSN  Clinical   Connect via sougou  Options provided:   -- Acute Systolic heart failure   -- Chronic Systolic heart failure   -- Acute on Chronic Systolic heart failure   -- Acute Diastolic heart failure   -- Chronic Diastolic heart failure   -- Acute on Chronic Diastolic heart failure   -- Acute Systolic and Diastolic heart failure   -- Chronic " Systolic and Diastolic heart failure   -- Acute on Chronic Systolic and diastolic heart failure   -- Unable to determine      Query created by: Hiren Alcaraz on 6/7/2021 7:03 AM    RESPONSE TEXT:    Unable to determine          Electronically signed by:  RAUL HARGROVE MD 6/7/2021 6:47 PM

## 2021-06-10 ENCOUNTER — OFFICE VISIT (OUTPATIENT)
Dept: CARDIOLOGY | Facility: MEDICAL CENTER | Age: 76
End: 2021-06-10
Payer: MEDICARE

## 2021-06-10 ENCOUNTER — HOSPITAL ENCOUNTER (INPATIENT)
Facility: MEDICAL CENTER | Age: 76
LOS: 3 days | DRG: 246 | End: 2021-06-13
Attending: EMERGENCY MEDICINE | Admitting: HOSPITALIST
Payer: MEDICARE

## 2021-06-10 ENCOUNTER — APPOINTMENT (OUTPATIENT)
Dept: RADIOLOGY | Facility: MEDICAL CENTER | Age: 76
DRG: 246 | End: 2021-06-10
Attending: EMERGENCY MEDICINE
Payer: MEDICARE

## 2021-06-10 VITALS
HEART RATE: 148 BPM | WEIGHT: 175 LBS | OXYGEN SATURATION: 95 % | SYSTOLIC BLOOD PRESSURE: 100 MMHG | RESPIRATION RATE: 16 BRPM | HEIGHT: 70 IN | DIASTOLIC BLOOD PRESSURE: 68 MMHG | BODY MASS INDEX: 25.05 KG/M2

## 2021-06-10 DIAGNOSIS — I21.4 NSTEMI (NON-ST ELEVATED MYOCARDIAL INFARCTION) (HCC): ICD-10-CM

## 2021-06-10 DIAGNOSIS — Z79.899 HIGH RISK MEDICATION USE: ICD-10-CM

## 2021-06-10 DIAGNOSIS — I47.10 SVT (SUPRAVENTRICULAR TACHYCARDIA) (HCC): ICD-10-CM

## 2021-06-10 DIAGNOSIS — I48.20 CHRONIC ATRIAL FIBRILLATION (HCC): ICD-10-CM

## 2021-06-10 DIAGNOSIS — I48.91 ATRIAL FIBRILLATION WITH RVR (HCC): ICD-10-CM

## 2021-06-10 DIAGNOSIS — I50.9 CONGESTIVE HEART FAILURE, UNSPECIFIED HF CHRONICITY, UNSPECIFIED HEART FAILURE TYPE (HCC): ICD-10-CM

## 2021-06-10 DIAGNOSIS — E78.5 HYPERLIPIDEMIA, UNSPECIFIED HYPERLIPIDEMIA TYPE: ICD-10-CM

## 2021-06-10 DIAGNOSIS — I50.20 ACC/AHA STAGE C SYSTOLIC HEART FAILURE (HCC): ICD-10-CM

## 2021-06-10 DIAGNOSIS — R53.1 GENERALIZED WEAKNESS: ICD-10-CM

## 2021-06-10 DIAGNOSIS — I51.89 LEFT ATRIAL MASS: ICD-10-CM

## 2021-06-10 DIAGNOSIS — I50.9 ACUTE ON CHRONIC CONGESTIVE HEART FAILURE, UNSPECIFIED HEART FAILURE TYPE (HCC): ICD-10-CM

## 2021-06-10 DIAGNOSIS — R06.00 DYSPNEA, UNSPECIFIED TYPE: ICD-10-CM

## 2021-06-10 DIAGNOSIS — I10 HYPERTENSION, UNSPECIFIED TYPE: ICD-10-CM

## 2021-06-10 PROBLEM — I51.3 LA THROMBUS: Status: ACTIVE | Noted: 2021-06-10

## 2021-06-10 PROBLEM — I48.0 PAROXYSMAL ATRIAL FIBRILLATION (HCC): Status: RESOLVED | Noted: 2020-09-18 | Resolved: 2021-06-10

## 2021-06-10 PROBLEM — I48.0 PAROXYSMAL ATRIAL FIBRILLATION (HCC): Status: ACTIVE | Noted: 2020-09-18

## 2021-06-10 PROBLEM — F17.200 SMOKING: Status: ACTIVE | Noted: 2021-06-10

## 2021-06-10 LAB
ANION GAP SERPL CALC-SCNC: 13 MMOL/L (ref 7–16)
APTT PPP: 33 SEC (ref 24.7–36)
BASOPHILS # BLD AUTO: 0.8 % (ref 0–1.8)
BASOPHILS # BLD: 0.06 K/UL (ref 0–0.12)
BUN SERPL-MCNC: 26 MG/DL (ref 8–22)
CALCIUM SERPL-MCNC: 8.9 MG/DL (ref 8.5–10.5)
CHLORIDE SERPL-SCNC: 106 MMOL/L (ref 96–112)
CO2 SERPL-SCNC: 22 MMOL/L (ref 20–33)
CREAT SERPL-MCNC: 1.26 MG/DL (ref 0.5–1.4)
EKG IMPRESSION: NORMAL
EKG IMPRESSION: NORMAL
EOSINOPHIL # BLD AUTO: 0.16 K/UL (ref 0–0.51)
EOSINOPHIL NFR BLD: 2.3 % (ref 0–6.9)
ERYTHROCYTE [DISTWIDTH] IN BLOOD BY AUTOMATED COUNT: 51.6 FL (ref 35.9–50)
GLUCOSE SERPL-MCNC: 108 MG/DL (ref 65–99)
HCT VFR BLD AUTO: 47.3 % (ref 42–52)
HGB BLD-MCNC: 15.2 G/DL (ref 14–18)
IMM GRANULOCYTES # BLD AUTO: 0.03 K/UL (ref 0–0.11)
IMM GRANULOCYTES NFR BLD AUTO: 0.4 % (ref 0–0.9)
INR PPP: 1.4 (ref 0.87–1.13)
INR PPP: 1.41 (ref 0.87–1.13)
LACTATE BLD-SCNC: 1.6 MMOL/L (ref 0.5–2)
LACTATE BLD-SCNC: 1.7 MMOL/L (ref 0.5–2)
LACTATE BLD-SCNC: 2.1 MMOL/L (ref 0.5–2)
LACTATE BLD-SCNC: 2.2 MMOL/L (ref 0.5–2)
LYMPHOCYTES # BLD AUTO: 1.3 K/UL (ref 1–4.8)
LYMPHOCYTES NFR BLD: 18.4 % (ref 22–41)
MAGNESIUM SERPL-MCNC: 2.4 MG/DL (ref 1.5–2.5)
MCH RBC QN AUTO: 31.3 PG (ref 27–33)
MCHC RBC AUTO-ENTMCNC: 32.1 G/DL (ref 33.7–35.3)
MCV RBC AUTO: 97.5 FL (ref 81.4–97.8)
MONOCYTES # BLD AUTO: 0.62 K/UL (ref 0–0.85)
MONOCYTES NFR BLD AUTO: 8.8 % (ref 0–13.4)
NEUTROPHILS # BLD AUTO: 4.89 K/UL (ref 1.82–7.42)
NEUTROPHILS NFR BLD: 69.3 % (ref 44–72)
NRBC # BLD AUTO: 0 K/UL
NRBC BLD-RTO: 0 /100 WBC
NT-PROBNP SERPL IA-MCNC: 3931 PG/ML (ref 0–125)
PLATELET # BLD AUTO: 171 K/UL (ref 164–446)
PMV BLD AUTO: 11.2 FL (ref 9–12.9)
POTASSIUM SERPL-SCNC: 3.9 MMOL/L (ref 3.6–5.5)
PROTHROMBIN TIME: 16.7 SEC (ref 12–14.6)
PROTHROMBIN TIME: 17.7 SEC (ref 12–14.6)
RBC # BLD AUTO: 4.85 M/UL (ref 4.7–6.1)
SODIUM SERPL-SCNC: 141 MMOL/L (ref 135–145)
TROPONIN T SERPL-MCNC: 29 NG/L (ref 6–19)
TSH SERPL DL<=0.005 MIU/L-ACNC: 2.2 UIU/ML (ref 0.38–5.33)
WBC # BLD AUTO: 7.1 K/UL (ref 4.8–10.8)

## 2021-06-10 PROCEDURE — A9270 NON-COVERED ITEM OR SERVICE: HCPCS | Performed by: INTERNAL MEDICINE

## 2021-06-10 PROCEDURE — 36415 COLL VENOUS BLD VENIPUNCTURE: CPT

## 2021-06-10 PROCEDURE — 700102 HCHG RX REV CODE 250 W/ 637 OVERRIDE(OP): Performed by: STUDENT IN AN ORGANIZED HEALTH CARE EDUCATION/TRAINING PROGRAM

## 2021-06-10 PROCEDURE — 93005 ELECTROCARDIOGRAM TRACING: CPT | Performed by: EMERGENCY MEDICINE

## 2021-06-10 PROCEDURE — 99205 OFFICE O/P NEW HI 60 MIN: CPT | Performed by: NURSE PRACTITIONER

## 2021-06-10 PROCEDURE — 87040 BLOOD CULTURE FOR BACTERIA: CPT

## 2021-06-10 PROCEDURE — 99285 EMERGENCY DEPT VISIT HI MDM: CPT

## 2021-06-10 PROCEDURE — 71045 X-RAY EXAM CHEST 1 VIEW: CPT

## 2021-06-10 PROCEDURE — 99223 1ST HOSP IP/OBS HIGH 75: CPT | Performed by: HOSPITALIST

## 2021-06-10 PROCEDURE — 84484 ASSAY OF TROPONIN QUANT: CPT

## 2021-06-10 PROCEDURE — 700111 HCHG RX REV CODE 636 W/ 250 OVERRIDE (IP): Performed by: INTERNAL MEDICINE

## 2021-06-10 PROCEDURE — 84443 ASSAY THYROID STIM HORMONE: CPT

## 2021-06-10 PROCEDURE — 93000 ELECTROCARDIOGRAM COMPLETE: CPT | Performed by: INTERNAL MEDICINE

## 2021-06-10 PROCEDURE — 80048 BASIC METABOLIC PNL TOTAL CA: CPT

## 2021-06-10 PROCEDURE — 85025 COMPLETE CBC W/AUTO DIFF WBC: CPT

## 2021-06-10 PROCEDURE — U0003 INFECTIOUS AGENT DETECTION BY NUCLEIC ACID (DNA OR RNA); SEVERE ACUTE RESPIRATORY SYNDROME CORONAVIRUS 2 (SARS-COV-2) (CORONAVIRUS DISEASE [COVID-19]), AMPLIFIED PROBE TECHNIQUE, MAKING USE OF HIGH THROUGHPUT TECHNOLOGIES AS DESCRIBED BY CMS-2020-01-R: HCPCS

## 2021-06-10 PROCEDURE — 770020 HCHG ROOM/CARE - TELE (206)

## 2021-06-10 PROCEDURE — A9270 NON-COVERED ITEM OR SERVICE: HCPCS | Performed by: STUDENT IN AN ORGANIZED HEALTH CARE EDUCATION/TRAINING PROGRAM

## 2021-06-10 PROCEDURE — 83735 ASSAY OF MAGNESIUM: CPT

## 2021-06-10 PROCEDURE — 99223 1ST HOSP IP/OBS HIGH 75: CPT | Performed by: INTERNAL MEDICINE

## 2021-06-10 PROCEDURE — 85610 PROTHROMBIN TIME: CPT

## 2021-06-10 PROCEDURE — U0005 INFEC AGEN DETEC AMPLI PROBE: HCPCS

## 2021-06-10 PROCEDURE — 83880 ASSAY OF NATRIURETIC PEPTIDE: CPT

## 2021-06-10 PROCEDURE — 83605 ASSAY OF LACTIC ACID: CPT

## 2021-06-10 PROCEDURE — 700102 HCHG RX REV CODE 250 W/ 637 OVERRIDE(OP): Performed by: INTERNAL MEDICINE

## 2021-06-10 PROCEDURE — 93005 ELECTROCARDIOGRAM TRACING: CPT

## 2021-06-10 PROCEDURE — 85730 THROMBOPLASTIN TIME PARTIAL: CPT

## 2021-06-10 PROCEDURE — 96374 THER/PROPH/DIAG INJ IV PUSH: CPT

## 2021-06-10 RX ORDER — ACETAMINOPHEN 325 MG/1
650 TABLET ORAL EVERY 6 HOURS PRN
Status: DISCONTINUED | OUTPATIENT
Start: 2021-06-10 | End: 2021-06-13 | Stop reason: HOSPADM

## 2021-06-10 RX ORDER — BISACODYL 10 MG
10 SUPPOSITORY, RECTAL RECTAL
Status: DISCONTINUED | OUTPATIENT
Start: 2021-06-10 | End: 2021-06-13 | Stop reason: HOSPADM

## 2021-06-10 RX ORDER — DILTIAZEM HYDROCHLORIDE 5 MG/ML
10 INJECTION INTRAVENOUS ONCE
Status: DISCONTINUED | OUTPATIENT
Start: 2021-06-10 | End: 2021-06-10

## 2021-06-10 RX ORDER — POLYETHYLENE GLYCOL 3350 17 G/17G
1 POWDER, FOR SOLUTION ORAL
Status: DISCONTINUED | OUTPATIENT
Start: 2021-06-10 | End: 2021-06-13 | Stop reason: HOSPADM

## 2021-06-10 RX ORDER — AMOXICILLIN 250 MG
2 CAPSULE ORAL 2 TIMES DAILY
Status: DISCONTINUED | OUTPATIENT
Start: 2021-06-10 | End: 2021-06-13 | Stop reason: HOSPADM

## 2021-06-10 RX ORDER — DIGOXIN 0.25 MG/ML
500 INJECTION INTRAMUSCULAR; INTRAVENOUS ONCE
Status: COMPLETED | OUTPATIENT
Start: 2021-06-10 | End: 2021-06-10

## 2021-06-10 RX ORDER — METOPROLOL TARTRATE 1 MG/ML
5 INJECTION, SOLUTION INTRAVENOUS
Status: DISCONTINUED | OUTPATIENT
Start: 2021-06-10 | End: 2021-06-13 | Stop reason: HOSPADM

## 2021-06-10 RX ORDER — ATORVASTATIN CALCIUM 20 MG/1
20 TABLET, FILM COATED ORAL DAILY
Status: DISCONTINUED | OUTPATIENT
Start: 2021-06-10 | End: 2021-06-12

## 2021-06-10 RX ADMIN — ATORVASTATIN CALCIUM 20 MG: 20 TABLET, FILM COATED ORAL at 12:49

## 2021-06-10 RX ADMIN — DIGOXIN 500 MCG: 250 INJECTION, SOLUTION INTRAMUSCULAR; INTRAVENOUS; PARENTERAL at 10:53

## 2021-06-10 RX ADMIN — METOPROLOL TARTRATE 25 MG: 25 TABLET, FILM COATED ORAL at 12:49

## 2021-06-10 RX ADMIN — APIXABAN 5 MG: 5 TABLET, FILM COATED ORAL at 17:39

## 2021-06-10 ASSESSMENT — ENCOUNTER SYMPTOMS
BRUISES/BLEEDS EASILY: 0
VOMITING: 0
PND: 0
ORTHOPNEA: 1
DIZZINESS: 1
VOMITING: 0
LOSS OF CONSCIOUSNESS: 0
DIZZINESS: 1
ABDOMINAL PAIN: 0
MYALGIAS: 0
FOCAL WEAKNESS: 0
PALPITATIONS: 0
PALPITATIONS: 1
FEVER: 0
POLYDIPSIA: 0
NAUSEA: 0
SORE THROAT: 0
BLOOD IN STOOL: 0
COUGH: 0
BRUISES/BLEEDS EASILY: 0
CLAUDICATION: 0
FEVER: 0
FALLS: 0
SHORTNESS OF BREATH: 1
HEADACHES: 0
MEMORY LOSS: 0
TINGLING: 0
ORTHOPNEA: 0
NAUSEA: 0
WEIGHT LOSS: 0
DOUBLE VISION: 0
NERVOUS/ANXIOUS: 1
LOSS OF CONSCIOUSNESS: 0
BLURRED VISION: 0
NECK PAIN: 0
CHILLS: 0
PHOTOPHOBIA: 0
ABDOMINAL PAIN: 0
SHORTNESS OF BREATH: 1
COUGH: 0

## 2021-06-10 ASSESSMENT — MINNESOTA LIVING WITH HEART FAILURE QUESTIONNAIRE (MLHF)
TOTAL_SCORE: 81
GIVING YOU SIDE EFFECTS FROM TREATMENTS: 4
WORKING AROUND THE HOUSE OR YARD DIFFICULT: 4
DIFFICULTY WITH RECREATIONAL PASTIMES, SPORTS, HOBBIES: 3
DIFFICULTY TO CONCENTRATE OR REMEMBERING THINGS: 4
MAKING YOU STAY IN A HOSPITAL: 2
DIFFICULTY WORKING TO EARN A LIVING: 0
TIRED, FATIGUED OR LOW ON ENERGY: 5
DIFFICULTY SOCIALIZING WITH FAMILY OR FRIENDS: 4
MAKING YOU WORRY: 5
MAKING YOU FEEL DEPRESSED: 5
DIFFICULTY WITH SEXUAL ACTIVITIES: 4
LOSS OF SELF CONTROL IN YOUR LIFE: 4
HAVING TO SIT OR LIE DOWN DURING THE DAY: 5
WALKING ABOUT OR CLIMBING STAIRS DIFFICULT: 4
DIFFICULTY GOING AWAY FROM HOME: 4
SWELLING IN ANKLES OR LEGS: 5
MAKING YOU SHORT OF BREATH: 5
FEELING LIKE A BURDEN TO FAMILY AND FRIENDS: 3
COSTING YOU MONEY FOR MEDICAL CARE: 3
DIFFICULTY SLEEPING WELL AT NIGHT: 5
EATING LESS FOODS YOU LIKE: 3

## 2021-06-10 ASSESSMENT — PATIENT HEALTH QUESTIONNAIRE - PHQ9
SUM OF ALL RESPONSES TO PHQ9 QUESTIONS 1 AND 2: 0
2. FEELING DOWN, DEPRESSED, IRRITABLE, OR HOPELESS: NOT AT ALL
1. LITTLE INTEREST OR PLEASURE IN DOING THINGS: NOT AT ALL

## 2021-06-10 ASSESSMENT — LIFESTYLE VARIABLES
ALCOHOL_USE: NO
EVER HAD A DRINK FIRST THING IN THE MORNING TO STEADY YOUR NERVES TO GET RID OF A HANGOVER: NO
DOES PATIENT WANT TO STOP DRINKING: NO
ON A TYPICAL DAY WHEN YOU DRINK ALCOHOL HOW MANY DRINKS DO YOU HAVE: 1
AVERAGE NUMBER OF DAYS PER WEEK YOU HAVE A DRINK CONTAINING ALCOHOL: 1
HAVE PEOPLE ANNOYED YOU BY CRITICIZING YOUR DRINKING: NO
HAVE YOU EVER FELT YOU SHOULD CUT DOWN ON YOUR DRINKING: NO
CONSUMPTION TOTAL: NEGATIVE
TOTAL SCORE: 0
TOTAL SCORE: 0
EVER FELT BAD OR GUILTY ABOUT YOUR DRINKING: NO
TOTAL SCORE: 0
SUBSTANCE_ABUSE: 0
HOW MANY TIMES IN THE PAST YEAR HAVE YOU HAD 5 OR MORE DRINKS IN A DAY: 0

## 2021-06-10 ASSESSMENT — COGNITIVE AND FUNCTIONAL STATUS - GENERAL
DRESSING REGULAR LOWER BODY CLOTHING: A LITTLE
MOVING FROM LYING ON BACK TO SITTING ON SIDE OF FLAT BED: A LITTLE
TURNING FROM BACK TO SIDE WHILE IN FLAT BAD: A LITTLE
SUGGESTED CMS G CODE MODIFIER DAILY ACTIVITY: CI
SUGGESTED CMS G CODE MODIFIER MOBILITY: CJ
DAILY ACTIVITIY SCORE: 23
MOVING TO AND FROM BED TO CHAIR: A LITTLE
MOBILITY SCORE: 21

## 2021-06-10 ASSESSMENT — CHA2DS2 SCORE
SEX: MALE
VASCULAR DISEASE: NO
CHF OR LEFT VENTRICULAR DYSFUNCTION: YES
AGE 65 TO 74: NO
AGE 75 OR GREATER: YES
DIABETES: NO
HYPERTENSION: YES
PRIOR STROKE OR TIA OR THROMBOEMBOLISM: NO
CHA2DS2 VASC SCORE: 4

## 2021-06-10 ASSESSMENT — FIBROSIS 4 INDEX
FIB4 SCORE: 1.58
FIB4 SCORE: 1.58
FIB4 SCORE: 1.91
FIB4 SCORE: 1.91

## 2021-06-10 ASSESSMENT — PAIN DESCRIPTION - PAIN TYPE
TYPE: ACUTE PAIN
TYPE: ACUTE PAIN

## 2021-06-10 NOTE — ED PROVIDER NOTES
ED Provider Note    Scribed for Tristen Jensen M.D. by Jeevan Hall. 6/10/2021  10:05 AM    Primary care provider: Pcp Pt States None  Means of arrival: EMS  History obtained from: Patient  History limited by: None    CHIEF COMPLAINT  Chief Complaint   Patient presents with    Dizziness    Shortness of Breath    Atrial Fibrillation       HPI  Rajesh De Luna is a 75 y.o. male who presents to the Emergency Department via EMS for atrial fibrillation. Patient has a history of Afib and was found to be in Afib this morning in clinic and sent here. EMS provided 500 mL NS which provided mild relief. He reports feeling short of breath and dizzy intermittently for the past 2 weeks. Patient has otherwise felt normal.     REVIEW OF SYSTEMS  Pertinent positives include atrial fibrillation, shortness of breath, and dizziness.    All other systems reviewed and negative. See HPI for further details.       PAST MEDICAL HISTORY   has a past medical history of A-fib (HCC), Cancer (HCC) (2019), and Hypertension.    SURGICAL HISTORY  patient denies any surgical history    SOCIAL HISTORY  Social History     Tobacco Use    Smoking status: Current Some Day Smoker    Smokeless tobacco: Never Used    Tobacco comment: Cigars occasionally   Vaping Use    Vaping Use: Never used   Substance Use Topics    Alcohol use: Yes     Alcohol/week: 1.8 oz     Types: 3 Cans of beer per week    Drug use: Never      Social History     Substance and Sexual Activity   Drug Use Never       FAMILY HISTORY  No family history on file.    CURRENT MEDICATIONS  Home Medications       Reviewed by Rafael Trinh (Pharmacy Tech) on 06/10/21 at 1053  Med List Status: Complete     Medication Last Dose Status   apixaban (ELIQUIS) 5mg Tab 6/10/2021 Active   atorvastatin (LIPITOR) 20 MG Tab 6/10/2021 Active   furosemide (LASIX) 40 MG Tab 6/10/2021 Active   metoprolol tartrate (LOPRESSOR) 100 MG Tab NOT TAKING Active   potassium chloride SA (KDUR) 20 MEQ Tab CR  "6/10/2021 Active   triamcinolone acetonide (KENALOG) 0.1 % Cream PRN Active                    ALLERGIES  Allergies   Allergen Reactions    Penicillins Unspecified     Last dose  > 60 years ago         PHYSICAL EXAM  VITAL SIGNS: /77   Pulse (!) 132   Temp (!) 35.6 °C (96.1 °F) (Oral)   Resp 16   Ht 1.778 m (5' 10\")   Wt 78.9 kg (174 lb)   SpO2 94%   BMI 24.97 kg/m²   Nursing note and vitals reviewed.  Constitutional: Chronically ill-appearing. No distress.   HENT: Head is normocephalic and atraumatic. Oropharynx is clear and moist without exudate or erythema.   Eyes: Pupils are equal, round, and reactive to light. Conjunctiva are normal.   Cardiovascular: Tachycardic with irregularly irregular rhythm. No murmur heard. Normal radial pulses.  Pulmonary/Chest: Breath sounds normal. No wheezes or rales.   Abdominal: Soft and non-tender. No distention    Musculoskeletal: Extremities exhibit normal range of motion without edema or tenderness.   Neurological: Awake, alert and oriented to person, place, and time. No focal deficits noted.  Skin: Skin is warm and dry. No rash.   Psychiatric: Normal mood and affect. Appropriate for clinical situation.    DIAGNOSTIC STUDIES / PROCEDURES    EKG Interpretation  Interpreted by me as below    LABS  Results for orders placed or performed during the hospital encounter of 06/10/21   CBC WITH DIFFERENTIAL   Result Value Ref Range    WBC 7.1 4.8 - 10.8 K/uL    RBC 4.85 4.70 - 6.10 M/uL    Hemoglobin 15.2 14.0 - 18.0 g/dL    Hematocrit 47.3 42.0 - 52.0 %    MCV 97.5 81.4 - 97.8 fL    MCH 31.3 27.0 - 33.0 pg    MCHC 32.1 (L) 33.7 - 35.3 g/dL    RDW 51.6 (H) 35.9 - 50.0 fL    Platelet Count 171 164 - 446 K/uL    MPV 11.2 9.0 - 12.9 fL    Neutrophils-Polys 69.30 44.00 - 72.00 %    Lymphocytes 18.40 (L) 22.00 - 41.00 %    Monocytes 8.80 0.00 - 13.40 %    Eosinophils 2.30 0.00 - 6.90 %    Basophils 0.80 0.00 - 1.80 %    Immature Granulocytes 0.40 0.00 - 0.90 %    Nucleated RBC " 0.00 /100 WBC    Neutrophils (Absolute) 4.89 1.82 - 7.42 K/uL    Lymphs (Absolute) 1.30 1.00 - 4.80 K/uL    Monos (Absolute) 0.62 0.00 - 0.85 K/uL    Eos (Absolute) 0.16 0.00 - 0.51 K/uL    Baso (Absolute) 0.06 0.00 - 0.12 K/uL    Immature Granulocytes (abs) 0.03 0.00 - 0.11 K/uL    NRBC (Absolute) 0.00 K/uL   BASIC METABOLIC PANEL   Result Value Ref Range    Sodium 141 135 - 145 mmol/L    Potassium 3.9 3.6 - 5.5 mmol/L    Chloride 106 96 - 112 mmol/L    Co2 22 20 - 33 mmol/L    Glucose 108 (H) 65 - 99 mg/dL    Bun 26 (H) 8 - 22 mg/dL    Creatinine 1.26 0.50 - 1.40 mg/dL    Calcium 8.9 8.5 - 10.5 mg/dL    Anion Gap 13.0 7.0 - 16.0   TROPONIN   Result Value Ref Range    Troponin T 29 (H) 6 - 19 ng/L   proBrain Natriuretic Peptide, NT   Result Value Ref Range    NT-proBNP 3931 (H) 0 - 125 pg/mL   ESTIMATED GFR   Result Value Ref Range    GFR If African American >60 >60 mL/min/1.73 m 2    GFR If Non African American 55 (A) >60 mL/min/1.73 m 2   EKG (NOW)   Result Value Ref Range    Report       [facility] Emergency Dept.    Test Date:  2021-06-10  Pt Name:    JACOBO RAMIREZ             Department: ER  MRN:        [id number]                      Room:       RD 12  Gender:     Male                         Technician: [id number]  :        1945                   Requested By:ER TRIAGE PROTOCOL  Order #:    [id number]                    Reading MD: REGAN DURHAM MD    Measurements  Intervals                                Axis  Rate:       136                          P:  ME:                                      QRS:        34  QRSD:       82                           T:  QT:         320  QTc:        482    Interpretive Statements  ATRIAL FIBRILLATION, V-RATE 109-161  BORDERLINE LOW VOLTAGE IN FRONTAL LEADS  BORDERLINE T ABNORMALITIES, DIFFUSE LEADS  BORDERLINE PROLONGED QT INTERVAL  Compared to ECG 06/10/2021 07:22:17  T-wave abnormality now present  Electronically Signed On 6- 10:49:43 PDT  by TRISTEN DURHAM MD       All labs reviewed by me.    RADIOLOGY  DX-CHEST-PORTABLE (1 VIEW)   Final Result      No acute cardiac or pulmonary abnormalities are identified.        The radiologist's interpretation of all radiological studies have been reviewed by me.    COURSE & MEDICAL DECISION MAKING  Nursing notes, VS, PMSFHx reviewed in chart.     Review of past medical records shows the patient was admitted on 6/1/21 for 5 days with atrial fibrillation with RVR. He was started on Metoprolol but he stopped because it made him dizzy. Patient was seen in clinic today and found to be in atrial fibrillation and sent here.     10:05 AM - Patient seen and examined at bedside. Ordered DX-chest, CBC w/ diff, BMP, Troponin, ProBNP, PT/INR, APTT, and EKG to evaluate his symptoms. The differential diagnoses include but are not limited to: Atrial fibrillation with rapid ventricular response, CHF, pulmonary edema, electrolyte abnormality, RVR related to noncompliance with metoprolol due to side effects    10:51 AM - Patient seen in ED by Dr. Ruiz (Cardiology); please see his note for further details.     Laboratory studies remarkable for an elevated troponin, elevated BNP.  Overall clinical presentation is consistent with atrial fibrillation with rapid ventricular response and congestive heart failure.  He will be admitted to the hospital for further evaluation and treatment.  Please see cardiology note for plan.  Spoke with the on-call resident team for admission.    DISPOSITION:  Patient will be hospitalized by in stable condition.    FINAL IMPRESSION  1. Atrial fibrillation with RVR (HCC)    2. Acute on chronic congestive heart failure, unspecified heart failure type (HCC)    3. Generalized weakness    4. Dyspnea, unspecified type          Jeevan HALL am (Scribe) scribing for, and in the presence of, Tristen Durham M.D..    Electronically signed by: Jeevan Hall ([place]angel), 6/10/2021    Tristen HALL  M.D. personally performed the services described in this documentation, as scribed by Jeevan Hall in my presence, and it is both accurate and complete.    The note accurately reflects work and decisions made by me.  Tristen Jensen M.D.  6/10/2021  11:29 AM

## 2021-06-10 NOTE — CARE PLAN
The patient is Watcher - Medium risk of patient condition declining or worsening         Progress made toward(s) clinical / shift goals:    Problem: Knowledge Deficit - Standard  Goal: Patient and family/care givers will demonstrate understanding of plan of care, disease process/condition, diagnostic tests and medications  Outcome: Progressing  POC discussed with patient. Patient verbalized understanding. Possible cardioversion and PORTILLO tomorrow.     Problem: Fall Risk  Goal: Patient will remain free from falls  Outcome: Progressing  Bed locked and in lowest position. Bed alarm on. Treaded socks in use. Call light and belongings within reach. Patient educated to call for assistance. Patient verbalizes understanding. Hourly rounding in place.         Patient is not progressing towards the following goals:

## 2021-06-10 NOTE — PROGRESS NOTES
Chief Complaint   Patient presents with   • Congestive Heart Failure       Subjective:   Rajesh De Luna is a 75 y.o. male who presents today for A. fib follow-up after recent admission to the hospital on 6/1/2021 for A. fib with RVR, rate related cardiomyopathy, and acute hypoxic respiratory failure.     Patient's past medical history of A. fib, hypertension, and alcohol use    Today, patient reports that he does not feel well he has been short of breath ever since he was discharged from the hospital.  Patient reports orthopnea and fatigue as well as dizziness.  Patient reports he stopped taking his metoprolol due to dizziness.  His blood pressure in office today is adequate at 100/68.  Patient not monitoring blood pressure at home.  EKG personally interpreted by me shows atrial fibrillation with RVR at 140s.  Patient reports he is considering going to the ER, but wanting to be evaluated by cardiology first.  We discussed that I agree with this plan to further manage his A. fib and heart failure in the hospital setting with closer monitoring.  I would not make medication changes today due to continued evaluation in ER.      Discussed case with Dr. Ruiz.  He is aware of patient coming into ER.    Patient reports he has been previously admitted at [facility] for A. fib.  He reports taking his Eliquis since February.  Patient reports stopping alcohol over a month.  Patient is agreeable to plan; he will be transported via [facility].    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 No pertinent surgical history.  History reviewed. No pertinent family history.  Social History     Socioeconomic History   • Marital status: Single     Spouse name: Not on file   • Number of children: Not on file   • Years of education: Not on file   • Highest education level: Not on file   Occupational History   • Not on file   Tobacco Use   • Smoking status:  Current Some Day Smoker   • Smokeless tobacco: Never Used   • Tobacco comment: Cigars occasionally   Vaping Use   • Vaping Use: Never used   Substance and Sexual Activity   • Alcohol use: Yes     Alcohol/week: 1.8 oz     Types: 3 Cans of beer per week   • Drug use: Never   • Sexual activity: Not on file   Other Topics Concern   • Not on file   Social History Narrative   • Not on file     Social Determinants of Health     Financial Resource Strain:    • Difficulty of Paying Living Expenses:    Food Insecurity:    • Worried About Running Out of Food in the Last Year:    • Ran Out of Food in the Last Year:    Transportation Needs:    • Lack of Transportation (Medical):    • Lack of Transportation (Non-Medical):    Physical Activity:    • Days of Exercise per Week:    • Minutes of Exercise per Session:    Stress:    • Feeling of Stress :    Social Connections:    • Frequency of Communication with Friends and Family:    • Frequency of Social Gatherings with Friends and Family:    • Attends [employer] Services:    • Active Member of Clubs or Organizations:    • Attends Club or Organization Meetings:    • Marital Status:    Intimate Partner Violence:    • Fear of Current or Ex-Partner:    • Emotionally Abused:    • Physically Abused:    • Sexually Abused:      Allergies   Allergen Reactions   • Penicillins Unspecified     Last dose  > 60 years ago       Outpatient Encounter Medications as of 6/10/2021   Medication Sig Dispense Refill   • furosemide (LASIX) 40 MG Tab Take 1 tablet by mouth every day for 30 days. 30 tablet 0   • metoprolol tartrate (LOPRESSOR) 100 MG Tab Take 1 tablet by mouth 2 times a day. 60 tablet 0   • apixaban (ELIQUIS) 5mg Tab Take 5 mg by mouth 2 times a day.     • potassium chloride SA (KDUR) 20 MEQ Tab CR Take 20 mEq by mouth every day.     • triamcinolone acetonide (KENALOG) 0.1 % Cream Apply 1 Application to affected area(s) 2 times a day. 30 g 0   • atorvastatin (LIPITOR) 20 MG Tab Take 1 Tab by  "mouth every day. (Patient not taking: Reported on 6/10/2021) 30 Tab 3     No facility-administered encounter medications on file as of 6/10/2021.     Review of Systems   Constitutional: Positive for malaise/fatigue. Negative for fever and weight loss.   Eyes: Negative for blurred vision.   Respiratory: Positive for shortness of breath. Negative for cough.    Cardiovascular: Positive for orthopnea. Negative for chest pain, palpitations, claudication, leg swelling and PND.   Gastrointestinal: Negative for abdominal pain, blood in stool, nausea and vomiting.   Genitourinary: Negative for dysuria, frequency and hematuria.   Musculoskeletal: Negative for falls and myalgias.   Neurological: Positive for dizziness. Negative for tingling and loss of consciousness.   Endo/Heme/Allergies: Does not bruise/bleed easily.   Psychiatric/Behavioral: The patient is nervous/anxious.         Objective:   /68 (BP Location: Left arm, Patient Position: Sitting, BP Cuff Size: Adult)   Pulse (!) 148   Resp 16   Ht 1.778 m (5' 10\")   Wt 79.4 kg (175 lb)   SpO2 95%   BMI 25.11 kg/m²     Physical Exam   Constitutional: He is oriented to person, place, and time. He appears well-developed. He appears ill. He appears distressed.   HENT:   Head: Normocephalic and atraumatic.   Eyes: Pupils are equal, round, and reactive to light.   Neck: No JVD present.   Cardiovascular: Normal heart sounds and normal pulses. An irregular rhythm present. Tachycardia present. Exam reveals no gallop and no friction rub.   No murmur heard.  Pulmonary/Chest: Effort normal and breath sounds normal. No respiratory distress.   Abdominal: Soft. Bowel sounds are normal. He exhibits no distension.   Musculoskeletal:      Right lower leg: No edema.      Left lower leg: No edema.   Neurological: He is alert and oriented to person, place, and time.   Skin: Skin is warm and dry. No erythema. There is pallor.   Psychiatric: His behavior is normal. Mood normal. "   Vitals reviewed.    No results found for: CHOLSTRLTOT, LDL, HDL, TRIGLYCERIDE    Lab Results   Component Value Date/Time    SODIUM 143 06/05/2021 02:00 AM    POTASSIUM 4.0 06/05/2021 02:00 AM    CHLORIDE 105 06/05/2021 02:00 AM    CO2 24 06/05/2021 02:00 AM    GLUCOSE 98 06/05/2021 02:00 AM    BUN 30 (H) 06/05/2021 02:00 AM    CREATININE 1.31 06/05/2021 02:00 AM     Lab Results   Component Value Date/Time    ALKPHOSPHAT 151 (H) 06/05/2021 02:00 AM    ASTSGOT 24 06/05/2021 02:00 AM    ALTSGPT 32 06/05/2021 02:00 AM    TBILIRUBIN 1.3 06/05/2021 02:00 AM       Ref. Range 6/3/2021 01:16   TSH Latest Ref Range: 0.380 - 5.330 uIU/mL 3.340      Ref. Range 6/2/2021 01:16   NT-proBNP Latest Ref Range: 0 - 125 pg/mL 4828 (H)     Echo (6/2/2021): Severely reduced left ventricular systolic function.  Global hypokinesis.  Reduced right ventricular systolic function.  Severely dilated left atrium - prominence of the posterior LA wall-   possible thrombus/mass lesion- consider further evaluation with PORTILLO or   CT/MRI. Mild mitral regurgitation.    Assessment:     1. SVT (supraventricular tachycardia) ([facility])     2. Chronic atrial fibrillation (HCC)     3. Congestive heart failure, unspecified HF chronicity, unspecified heart failure type ([facility])  EKG   4. ACC/AHA stage C systolic heart failure (HCC)     5. High risk medication use     6. Hyperlipidemia, unspecified hyperlipidemia type     7. Left atrial mass         Medical Decision Making:  Today's Assessment / Status / Plan:   Atrial fibrillation with rapid ventricular rate  -Recently discharged on 6/5/2021 for A. Fib.  -Symptoms: Shortness of breath, dizziness, orthopnea  -[id number] Score 4  -Recommend further evaluation in ER for rate versus rhythm control.  Due to patient's underlying regulated cardiomyopathy, would prefer rhythm control.  Patient reports taking Eliquis since February  -Medications: Discharge from hospital on 100 mg twice daily; patient reports not taking due  to dizziness  -Anticoagulation: Eliquis  -Discussed case with Dr. Ruiz    HFrEF, Stage C, Class iii, LVEF 25%  -Heart failure due to etoh vs rate related  -ACE-I/ARB/ARNI: Consider initiation  -Evidence Based Beta-blocker: On metoprolol tartrate currently.  Titrate to optimal dose then transition to XL  -Aldosterone Antagonist: Consider initiation once above to optimize  -Diuretic: Furosemide 40 mg daily with 20 M EQ's potassium  -Labs: In ER    Possible Left atrial mass/Thrombus on TTE  -Patient reports OAC compliance.  Recommend further evaluation with PORTILLO, CT, or MRI.    Hypertension  -Today in office blood pressure is well controlled  -Medication recommendations per above.    Hyperlipidemia  -Discharge on Atorvastatin 20 mg daily  -Patient currently not taking  -Evaluate lipids determine ASCVD.    FU in clinic in 2 weeks. Sooner if needed.    Patient verbalizes understanding and agrees with the plan of care.     Collaborating MD: Dr. Alisia MD    PLEASE NOTE: This Note was created using voice recognition Software. I have made every reasonable attempt to correct obvious errors, but I expect that there are errors of grammar and possibly content that I did not discover before finalizing the note

## 2021-06-10 NOTE — PROGRESS NOTES
Spiritual Care Note    Patient Information     Patient's Name: Rajesh De Luna   MRN: [id number]    YOB: 1945   Age and Gender: 75 y.o. male   Service Area: ED [facility]   Room (and Bed):  12/12 RED   Ethnicity or Nationality:     Primary Language: English   Jainism/Spiritual preference: Yazdanism   Place of Residence: [place]   Family/Friends/Others Present: No   Clinical Team Present: No   Medical Diagnosis(-es)/Procedure(s): Dizziness   Code Status: Prior    Date of Admission: 6/10/2021   Length of Stay: 0 days        Spiritual Care Provider Information:  Name of Spiritual Care Provider: Zakiya Marcum  Title of Spiritual Care Provider: Associate Sarkar  Phone Number: [phone]  E-mail: [email or web address]  Total time : 5 minutes      Encounter/Request Information  Encounter/Request Type   Visited With: Patient  Nature of the Visit: Initial, On shift  General Visit: Yes  Referral From/ Origin of Request: [place] rounds, Verbal patient    Religous Needs/Values       Spiritual Assessment     Spiritual Care Encounters    Interaction/Conversation: Pt complained of dry mouth. With help of RN,  obtained mouth swab and gave it to the pt, who thanked her.    Plan: Visit Upon Request    Notes:

## 2021-06-10 NOTE — ED NOTES
Blood cultures and LA drawn and sent to lab. Pt aware of POC. Medicated per Cardiology and monitoring HR and BP closely.   Call light within reach.

## 2021-06-10 NOTE — ED TRIAGE NOTES
Chief Complaint   Patient presents with   • Dizziness   • Shortness of Breath   • Atrial Fibrillation     Pt was at his follow up cardiology appointment for being admitted 2 weeks ago for a-fib. Pt c/o in the last two weeks of having SOB and dizziness intermittently. Pt found to be in A-fib with RVR. HR in the 140's and BP was lower in the 90's.     EMS gave pt 500 ml NS and BP improved on arrival.   EKG completed. Chart up for ERP.

## 2021-06-10 NOTE — ED NOTES
Pt to T832-2 via ACLS RN. Pt has all belongings at time of transfer. No belongings left in room after transfer.

## 2021-06-10 NOTE — CONSULTS
Cardiology Consult Note:    Oniel Ruiz M.D.  Date & Time note created:    6/10/2021   10:43 AM     Referring MD:  Dr. Jensen    Patient ID:   Name:             Rajesh De Luna     YOB: 1945  Age:                 75 y.o.  male   MRN:               [id number]                                                             Reason for Consult:      CHF    History of Present Illness:    75-year-old male with no significant past medical history was admitted to the hospital approximately 7 days ago for shortness of breath.  He had an echocardiogram and EKG.  His EKG showed atrial fibrillation without rapid ventricular response.  His echocardiogram was significant for severely reduced LV systolic function with an EF estimated at 25% and a highly calcified aortic valve with reduced excursion.  His estimated aortic valve area was 0.93 with possible severe aortic stenosis versus pseudostenosis.  He was started on metoprolol tartrate but not succinate.  There was no cardiology consultation requested given his severe systolic heart failure.    Review of Systems:      Constitutional: Denies fevers, Denies weight changes  Eyes: Denies changes in vision, no eye pain  Ears/Nose/Throat/Mouth: Denies nasal congestion or sore throat   Cardiovascular: no chest pain, + palpitations   Respiratory: + shortness of breath , Denies cough  Gastrointestinal/Hepatic: Denies abdominal pain, nausea, vomiting, diarrhea, constipation or GI bleeding   Genitourinary: Denies dysuria or frequency  Musculoskeletal/Rheum: Denies  joint pain and swelling, + edema  Skin: Denies rash  Neurological: Denies headache, confusion, memory loss or focal weakness/parasthesias  Psychiatric: denies mood disorder   Endocrine: Chika thyroid problems  Heme/Oncology/Lymph Nodes: Denies enlarged lymph nodes, denies brusing or known bleeding disorder  All other systems were reviewed and are negative (AMA/CMS criteria)                Past Medical  History:   Past Medical History:   Diagnosis Date   • A-fib (HCC)    • Cancer (HCC) 2019    Lip cancer   • Hypertension      There are no active hospital problems to display for this patient.      Past Surgical History:  History reviewed. No pertinent surgical history.    Hospital Medications:  Current Facility-Administered Medications   Medication Dose   • digoxin (LANOXIN) injection 500 mcg  500 mcg     Current Outpatient Medications   Medication   • furosemide (LASIX) 40 MG Tab   • metoprolol tartrate (LOPRESSOR) 100 MG Tab   • apixaban (ELIQUIS) 5mg Tab   • potassium chloride SA (KDUR) 20 MEQ Tab CR   • atorvastatin (LIPITOR) 20 MG Tab   • triamcinolone acetonide (KENALOG) 0.1 % Cream         Current Outpatient Medications:  (Not in a hospital admission)      Medication Allergy:  Allergies   Allergen Reactions   • Penicillins Unspecified     Last dose  > 60 years ago         Family History:  No family history on file.    Social History:  Social History     Socioeconomic History   • Marital status: Single     Spouse name: Not on file   • Number of children: Not on file   • Years of education: Not on file   • Highest education level: Not on file   Occupational History   • Not on file   Tobacco Use   • Smoking status: Current Some Day Smoker   • Smokeless tobacco: Never Used   • Tobacco comment: Cigars occasionally   Vaping Use   • Vaping Use: Never used   Substance and Sexual Activity   • Alcohol use: Yes     Alcohol/week: 1.8 oz     Types: 3 Cans of beer per week   • Drug use: Never   • Sexual activity: Not on file   Other Topics Concern   • Not on file   Social History Narrative   • Not on file     Social Determinants of Health     Financial Resource Strain:    • Difficulty of Paying Living Expenses:    Food Insecurity:    • Worried About Running Out of Food in the Last Year:    • Ran Out of Food in the Last Year:    Transportation Needs:    • Lack of Transportation (Medical):    • Lack of Transportation  "(Non-Medical):    Physical Activity:    • Days of Exercise per Week:    • Minutes of Exercise per Session:    Stress:    • Feeling of Stress :    Social Connections:    • Frequency of Communication with Friends and Family:    • Frequency of Social Gatherings with Friends and Family:    • Attends Yazdanism Services:    • Active Member of Clubs or Organizations:    • Attends Club or Organization Meetings:    • Marital Status:    Intimate Partner Violence:    • Fear of Current or Ex-Partner:    • Emotionally Abused:    • Physically Abused:    • Sexually Abused:          Physical Exam:  Vitals/ General Appearance:   Weight/BMI: Body mass index is 24.97 kg/m².  /77   Pulse (!) 132   Temp (!) 35.6 °C (96.1 °F) (Oral)   Resp 16   Ht 1.778 m (5' 10\")   Wt 78.9 kg (174 lb)   SpO2 94%   Vitals:    06/10/21 0944   BP: 109/77   Pulse: (!) 132   Resp: 16   Temp: (!) 35.6 °C (96.1 °F)   TempSrc: Oral   SpO2: 94%   Weight: 78.9 kg (174 lb)   Height: 1.778 m (5' 10\")     Oxygen Therapy:  Pulse Oximetry: 94 %, O2 (LPM): 0, O2 Delivery Device: None - Room Air    Constitutional:   Well developed, Well nourished, No acute distress  HENMT:  Normocephalic, Atraumatic, Oropharynx moist mucous membranes, No oral exudates, Nose normal.  No thyromegaly.  Eyes:  EOMI, Conjunctiva normal, No discharge.  Neck:  Normal range of motion, No cervical tenderness,  no JVD.  Cardiovascular:  Normal heart rate, Normal rhythm, No murmurs, No rubs, No gallops.   Extremitites with intact distal pulses, no cyanosis, or edema.  Lungs:  Normal breath sounds, breath sounds clear to auscultation bilaterally,  no crackles, no wheezing.   Abdomen: Bowel sounds normal, Soft, No tenderness, No guarding, No rebound, No masses, No hepatosplenomegaly.  Skin: Warm, Dry, No erythema, No rash, no induration.  Neurologic: Alert & oriented x 3, No focal deficits noted, cranial nerves II through X are grossly intact.  Psychiatric: Affect normal, Judgment " normal, Mood normal.      MDM (Data Review):     Records reviewed and summarized in current documentation    Lab Data Review:  Recent Results (from the past 24 hour(s))   EKG    Collection Time: 06/10/21  7:22 AM   Result Value Ref Range    Report       [facility] [place] [facility]    Test Date:  2021-06-10  Pt Name:    JACOBO RAMIREZ             Department: [facility]  MRN:        [id number]                      Room:  Gender:     Male                         Technician: SMITH  :        1945                   Requested By:AYALA SHAFFER  Order #:    [id number]                    Reading MD:    Measurements  Intervals                                Axis  Rate:       148                          P:  KY:                                      QRS:        58  QRSD:       80                           T:          32  QT:         316  QTc:        497    Interpretive Statements  ATRIAL FIBRILLATION, V-RATE 120-172  BORDERLINE REPOLARIZATION ABNORMALITY  BORDERLINE PROLONGED QT INTERVAL  Compared to ECG 2021 18:44:35  T-wave abnormality no longer present     EKG (NOW)    Collection Time: 06/10/21  9:36 AM   Result Value Ref Range    Report       [facility]    Test Date:  2021-06-10  Pt Name:    JACOBO RAMIREZ             Department: ER  MRN:        [id number]                      Room:        12  Gender:     Male                         Technician: [id number]  :        1945                   Requested By:ER TRIAGE PROTOCOL  Order #:    [id number]                    Reading MD:    Measurements  Intervals                                Axis  Rate:       136                          P:  KY:                                      QRS:        34  QRSD:       82                           T:  QT:         320  QTc:        482    Interpretive Statements  ATRIAL FIBRILLATION, V-RATE 109-161  BORDERLINE LOW VOLTAGE IN FRONTAL LEADS  BORDERLINE T ABNORMALITIES, DIFFUSE LEADS  BORDERLINE  PROLONGED QT INTERVAL  Compared to ECG 06/10/2021 07:22:17  T-wave abnormality now present     CBC WITH DIFFERENTIAL    Collection Time: 06/10/21  9:57 AM   Result Value Ref Range    WBC 7.1 4.8 - 10.8 K/uL    RBC 4.85 4.70 - 6.10 M/uL    Hemoglobin 15.2 14.0 - 18.0 g/dL    Hematocrit 47.3 42.0 - 52.0 %    MCV 97.5 81.4 - 97.8 fL    MCH 31.3 27.0 - 33.0 pg    MCHC 32.1 (L) 33.7 - 35.3 g/dL    RDW 51.6 (H) 35.9 - 50.0 fL    Platelet Count 171 164 - 446 K/uL    MPV 11.2 9.0 - 12.9 fL    Neutrophils-Polys 69.30 44.00 - 72.00 %    Lymphocytes 18.40 (L) 22.00 - 41.00 %    Monocytes 8.80 0.00 - 13.40 %    Eosinophils 2.30 0.00 - 6.90 %    Basophils 0.80 0.00 - 1.80 %    Immature Granulocytes 0.40 0.00 - 0.90 %    Nucleated RBC 0.00 /100 WBC    Neutrophils (Absolute) 4.89 1.82 - 7.42 K/uL    Lymphs (Absolute) 1.30 1.00 - 4.80 K/uL    Monos (Absolute) 0.62 0.00 - 0.85 K/uL    Eos (Absolute) 0.16 0.00 - 0.51 K/uL    Baso (Absolute) 0.06 0.00 - 0.12 K/uL    Immature Granulocytes (abs) 0.03 0.00 - 0.11 K/uL    NRBC (Absolute) 0.00 K/uL   BASIC METABOLIC PANEL    Collection Time: 06/10/21  9:57 AM   Result Value Ref Range    Sodium 141 135 - 145 mmol/L    Potassium 3.9 3.6 - 5.5 mmol/L    Chloride 106 96 - 112 mmol/L    Co2 22 20 - 33 mmol/L    Glucose 108 (H) 65 - 99 mg/dL    Bun 26 (H) 8 - 22 mg/dL    Creatinine 1.26 0.50 - 1.40 mg/dL    Calcium 8.9 8.5 - 10.5 mg/dL    Anion Gap 13.0 7.0 - 16.0   TROPONIN    Collection Time: 06/10/21  9:57 AM   Result Value Ref Range    Troponin T 29 (H) 6 - 19 ng/L   proBrain Natriuretic Peptide, NT    Collection Time: 06/10/21  9:57 AM   Result Value Ref Range    NT-proBNP 3931 (H) 0 - 125 pg/mL   ESTIMATED GFR    Collection Time: 06/10/21  9:57 AM   Result Value Ref Range    GFR If African American >60 >60 mL/min/1.73 m 2    GFR If Non African American 55 (A) >60 mL/min/1.73 m 2       Imaging/Procedures Review:    Chest Xray:  Reviewed    EKG:   Dated 6/10/2021 personally viewed inter  myself showing A. fib with rapid ventricular response.    ECHO:  Per HPI    MDM (Assessment and Plan):     There are no active hospital problems to display for this patient.    75-year-old male with atrial fibrillation with rapid ventricular response, severe biventricular heart failure, aortic stenosis versus pseudostenosis with shortness of breath.  I will stop his delta and switch to IV digoxin load at 500.  He will stay on oral anticoagulation.  I will likely pursue DC cardioversion tomorrow morning.  I discussed with him the risk benefits and alternatives of this procedure.  He understands and agrees to proceed.    1. A-fib with RVR    - dig 500 now    - cont eliquis    - metop 25 PO now     - NPO at midnight    - DCCV tomorrow    2. CHFrEF vs. Valvular cardiomyopathy    - hold CHF meds pending DCCV    3. Aortic stenosis vs. pseudostenosis    - evaluate during PORTILLO

## 2021-06-10 NOTE — PROGRESS NOTES
4 Eyes Skin Assessment Completed by RICHARD Paris and RICHARD Keenan.    Head WDL  Ears Redness and Blanching  Nose WDL  Mouth WDL  Neck WDL  Breast/Chest WDL  Shoulder Blades WDL  Spine Redness and Blanching, bruising  (R) Arm/Elbow/Hand Blanching and Bruising  (L) Arm/Elbow/Hand Redness, Blanching and Bruising  Abdomen WDL  Groin WDL  Scrotum/Coccyx/Buttocks Redness and Blanching, Intact  (R) Leg WDL  (L) Leg WDL  (R) Heel/Foot/Toe Redness and Blanching, calloused  (L) Heel/Foot/Toe Redness and Blanching, and calloused.          Devices In Places Tele Box and Pulse Ox      Interventions In Place Pillows, patient turning self in bed.    Possible Skin Injury No    Pictures Uploaded Into Epic N/A  Wound Consult Placed N/A  RN Wound Prevention Protocol Ordered No

## 2021-06-10 NOTE — PROGRESS NOTES
Pt arrived to unit via gurney and MELANIES RNPt oriented to room, unit, and plan of care. Tele-monitor placed and monitor room notified/ verified.. All questions answered at this time. Call light within reach; fall precautions in place.

## 2021-06-11 ENCOUNTER — PATIENT OUTREACH (OUTPATIENT)
Dept: HEALTH INFORMATION MANAGEMENT | Facility: OTHER | Age: 76
End: 2021-06-11

## 2021-06-11 ENCOUNTER — APPOINTMENT (OUTPATIENT)
Dept: CARDIOLOGY | Facility: MEDICAL CENTER | Age: 76
DRG: 246 | End: 2021-06-11
Attending: NURSE PRACTITIONER
Payer: MEDICARE

## 2021-06-11 LAB
ALBUMIN SERPL BCP-MCNC: 3.6 G/DL (ref 3.2–4.9)
ALBUMIN/GLOB SERPL: 1.4 G/DL
ALP SERPL-CCNC: 132 U/L (ref 30–99)
ALT SERPL-CCNC: 19 U/L (ref 2–50)
AMPHET UR QL SCN: NEGATIVE
ANION GAP SERPL CALC-SCNC: 14 MMOL/L (ref 7–16)
APPEARANCE UR: CLEAR
AST SERPL-CCNC: 21 U/L (ref 12–45)
BARBITURATES UR QL SCN: NEGATIVE
BENZODIAZ UR QL SCN: NEGATIVE
BILIRUB SERPL-MCNC: 1 MG/DL (ref 0.1–1.5)
BILIRUB UR QL STRIP.AUTO: NEGATIVE
BUN SERPL-MCNC: 24 MG/DL (ref 8–22)
BZE UR QL SCN: NEGATIVE
CALCIUM SERPL-MCNC: 8.7 MG/DL (ref 8.5–10.5)
CANNABINOIDS UR QL SCN: NEGATIVE
CHLORIDE SERPL-SCNC: 108 MMOL/L (ref 96–112)
CO2 SERPL-SCNC: 22 MMOL/L (ref 20–33)
COLOR UR: YELLOW
CREAT SERPL-MCNC: 1.14 MG/DL (ref 0.5–1.4)
EKG IMPRESSION: NORMAL
EKG IMPRESSION: NORMAL
ERYTHROCYTE [DISTWIDTH] IN BLOOD BY AUTOMATED COUNT: 51.5 FL (ref 35.9–50)
ETHANOL BLD-MCNC: <10.1 MG/DL (ref 0–10)
GLOBULIN SER CALC-MCNC: 2.6 G/DL (ref 1.9–3.5)
GLUCOSE SERPL-MCNC: 103 MG/DL (ref 65–99)
GLUCOSE UR STRIP.AUTO-MCNC: NEGATIVE MG/DL
HCT VFR BLD AUTO: 46.1 % (ref 42–52)
HGB BLD-MCNC: 15.1 G/DL (ref 14–18)
KETONES UR STRIP.AUTO-MCNC: ABNORMAL MG/DL
LEUKOCYTE ESTERASE UR QL STRIP.AUTO: NEGATIVE
MCH RBC QN AUTO: 32 PG (ref 27–33)
MCHC RBC AUTO-ENTMCNC: 32.8 G/DL (ref 33.7–35.3)
MCV RBC AUTO: 97.7 FL (ref 81.4–97.8)
METHADONE UR QL SCN: NEGATIVE
MICRO URNS: ABNORMAL
NITRITE UR QL STRIP.AUTO: NEGATIVE
OPIATES UR QL SCN: NEGATIVE
OXYCODONE UR QL SCN: NEGATIVE
PCP UR QL SCN: NEGATIVE
PH UR STRIP.AUTO: 5.5 [PH] (ref 5–8)
PLATELET # BLD AUTO: 172 K/UL (ref 164–446)
PMV BLD AUTO: 11.3 FL (ref 9–12.9)
POTASSIUM SERPL-SCNC: 4.2 MMOL/L (ref 3.6–5.5)
PROPOXYPH UR QL SCN: NEGATIVE
PROT SERPL-MCNC: 6.2 G/DL (ref 6–8.2)
PROT UR QL STRIP: NEGATIVE MG/DL
RBC # BLD AUTO: 4.72 M/UL (ref 4.7–6.1)
RBC UR QL AUTO: NEGATIVE
SARS-COV-2 RNA RESP QL NAA+PROBE: NOTDETECTED
SODIUM SERPL-SCNC: 144 MMOL/L (ref 135–145)
SP GR UR STRIP.AUTO: 1.02
SPECIMEN SOURCE: NORMAL
TROPONIN T SERPL-MCNC: 26 NG/L (ref 6–19)
UROBILINOGEN UR STRIP.AUTO-MCNC: 1 MG/DL
WBC # BLD AUTO: 6.1 K/UL (ref 4.8–10.8)

## 2021-06-11 PROCEDURE — 99152 MOD SED SAME PHYS/QHP 5/>YRS: CPT | Performed by: INTERNAL MEDICINE

## 2021-06-11 PROCEDURE — 84484 ASSAY OF TROPONIN QUANT: CPT

## 2021-06-11 PROCEDURE — 92960 CARDIOVERSION ELECTRIC EXT: CPT

## 2021-06-11 PROCEDURE — 5A2204Z RESTORATION OF CARDIAC RHYTHM, SINGLE: ICD-10-PCS | Performed by: INTERNAL MEDICINE

## 2021-06-11 PROCEDURE — 93005 ELECTROCARDIOGRAM TRACING: CPT | Performed by: HOSPITALIST

## 2021-06-11 PROCEDURE — 80307 DRUG TEST PRSMV CHEM ANLYZR: CPT

## 2021-06-11 PROCEDURE — 92960 CARDIOVERSION ELECTRIC EXT: CPT | Performed by: INTERNAL MEDICINE

## 2021-06-11 PROCEDURE — 81003 URINALYSIS AUTO W/O SCOPE: CPT

## 2021-06-11 PROCEDURE — A9270 NON-COVERED ITEM OR SERVICE: HCPCS | Performed by: STUDENT IN AN ORGANIZED HEALTH CARE EDUCATION/TRAINING PROGRAM

## 2021-06-11 PROCEDURE — 93325 DOPPLER ECHO COLOR FLOW MAPG: CPT

## 2021-06-11 PROCEDURE — 93010 ELECTROCARDIOGRAM REPORT: CPT | Mod: 59,76 | Performed by: INTERNAL MEDICINE

## 2021-06-11 PROCEDURE — 36415 COLL VENOUS BLD VENIPUNCTURE: CPT

## 2021-06-11 PROCEDURE — 770020 HCHG ROOM/CARE - TELE (206)

## 2021-06-11 PROCEDURE — A9270 NON-COVERED ITEM OR SERVICE: HCPCS | Performed by: INTERNAL MEDICINE

## 2021-06-11 PROCEDURE — 93010 ELECTROCARDIOGRAM REPORT: CPT | Mod: 59 | Performed by: INTERNAL MEDICINE

## 2021-06-11 PROCEDURE — 99233 SBSQ HOSP IP/OBS HIGH 50: CPT | Mod: GC | Performed by: HOSPITALIST

## 2021-06-11 PROCEDURE — 700111 HCHG RX REV CODE 636 W/ 250 OVERRIDE (IP): Performed by: INTERNAL MEDICINE

## 2021-06-11 PROCEDURE — 80053 COMPREHEN METABOLIC PANEL: CPT

## 2021-06-11 PROCEDURE — 160002 HCHG RECOVERY MINUTES (STAT)

## 2021-06-11 PROCEDURE — 82077 ASSAY SPEC XCP UR&BREATH IA: CPT

## 2021-06-11 PROCEDURE — 93312 ECHO TRANSESOPHAGEAL: CPT | Mod: 26 | Performed by: INTERNAL MEDICINE

## 2021-06-11 PROCEDURE — 85027 COMPLETE CBC AUTOMATED: CPT

## 2021-06-11 PROCEDURE — 99233 SBSQ HOSP IP/OBS HIGH 50: CPT | Mod: 25 | Performed by: INTERNAL MEDICINE

## 2021-06-11 PROCEDURE — 700111 HCHG RX REV CODE 636 W/ 250 OVERRIDE (IP)

## 2021-06-11 PROCEDURE — 700102 HCHG RX REV CODE 250 W/ 637 OVERRIDE(OP): Performed by: INTERNAL MEDICINE

## 2021-06-11 PROCEDURE — 93005 ELECTROCARDIOGRAM TRACING: CPT | Performed by: INTERNAL MEDICINE

## 2021-06-11 PROCEDURE — 700102 HCHG RX REV CODE 250 W/ 637 OVERRIDE(OP): Performed by: STUDENT IN AN ORGANIZED HEALTH CARE EDUCATION/TRAINING PROGRAM

## 2021-06-11 RX ORDER — MIDAZOLAM HYDROCHLORIDE 1 MG/ML
INJECTION INTRAMUSCULAR; INTRAVENOUS
Status: DISPENSED
Start: 2021-06-11 | End: 2021-06-12

## 2021-06-11 RX ORDER — SODIUM CHLORIDE 9 MG/ML
500 INJECTION, SOLUTION INTRAVENOUS
Status: ACTIVE | OUTPATIENT
Start: 2021-06-11 | End: 2021-06-11

## 2021-06-11 RX ORDER — MIDAZOLAM HYDROCHLORIDE 1 MG/ML
INJECTION INTRAMUSCULAR; INTRAVENOUS
Status: COMPLETED
Start: 2021-06-11 | End: 2021-06-11

## 2021-06-11 RX ORDER — MIDAZOLAM HYDROCHLORIDE 1 MG/ML
.5-2 INJECTION INTRAMUSCULAR; INTRAVENOUS PRN
Status: ACTIVE | OUTPATIENT
Start: 2021-06-11 | End: 2021-06-11

## 2021-06-11 RX ADMIN — FENTANYL CITRATE 25 MCG: 50 INJECTION, SOLUTION INTRAMUSCULAR; INTRAVENOUS at 13:23

## 2021-06-11 RX ADMIN — APIXABAN 5 MG: 5 TABLET, FILM COATED ORAL at 04:43

## 2021-06-11 RX ADMIN — ATORVASTATIN CALCIUM 20 MG: 20 TABLET, FILM COATED ORAL at 04:43

## 2021-06-11 RX ADMIN — METOPROLOL TARTRATE 25 MG: 25 TABLET, FILM COATED ORAL at 16:33

## 2021-06-11 RX ADMIN — FENTANYL CITRATE 25 MCG: 50 INJECTION, SOLUTION INTRAMUSCULAR; INTRAVENOUS at 13:24

## 2021-06-11 RX ADMIN — FENTANYL CITRATE 50 MCG: 50 INJECTION, SOLUTION INTRAMUSCULAR; INTRAVENOUS at 13:08

## 2021-06-11 RX ADMIN — METOPROLOL TARTRATE 25 MG: 25 TABLET, FILM COATED ORAL at 04:43

## 2021-06-11 RX ADMIN — MIDAZOLAM HYDROCHLORIDE 1 MG: 1 INJECTION, SOLUTION INTRAMUSCULAR; INTRAVENOUS at 13:23

## 2021-06-11 RX ADMIN — MIDAZOLAM HYDROCHLORIDE 2 MG: 1 INJECTION, SOLUTION INTRAMUSCULAR; INTRAVENOUS at 13:08

## 2021-06-11 ASSESSMENT — ENCOUNTER SYMPTOMS
CLAUDICATION: 0
STRIDOR: 0
COUGH: 0
WHEEZING: 0
CONSTIPATION: 0
ABDOMINAL PAIN: 0
APNEA: 0
VOMITING: 0
NAUSEA: 0
PND: 0
PALPITATIONS: 1
MYALGIAS: 0
DIZZINESS: 0
SHORTNESS OF BREATH: 1
TREMORS: 0
FLANK PAIN: 0
WEIGHT LOSS: 0
HEMOPTYSIS: 0
ORTHOPNEA: 1
CHILLS: 0
SHORTNESS OF BREATH: 0
DIARRHEA: 0
DEPRESSION: 0
FEVER: 0
BLURRED VISION: 0
HEARTBURN: 0
TINGLING: 0
CHEST TIGHTNESS: 0
SENSORY CHANGE: 0
SPUTUM PRODUCTION: 0
HEADACHES: 0
BLOOD IN STOOL: 0
CHOKING: 0

## 2021-06-11 ASSESSMENT — PAIN DESCRIPTION - PAIN TYPE
TYPE: ACUTE PAIN
TYPE: ACUTE PAIN

## 2021-06-11 ASSESSMENT — FIBROSIS 4 INDEX: FIB4 SCORE: 2.16

## 2021-06-11 NOTE — PROGRESS NOTES
Daily Progress Note:     Date of Service: 6/11/2021  Primary Team: [facility] IM Orange Team   Attending: Dwayne Aponte M.D.   Senior Resident: Dr. Knight  Intern: Dr. Zamarripa  Contact:  [phone]    Chief Complaint:   Palpitations     Subjective:   No acute issues overnight. Patient currently in Afib. Otherwise hemodynamically stable. Patient seen this morning during Am rounds. No chest pain or shortness of breath. He does endorse orthopnea. Requires several pillows when he sleeps. Denies any new medication chages or over the counter supplements. No alcohol or drug use. Denies any lower extremity edema.     Consultants/Specialty:  Cardiology     Review of Systems:    Review of Systems   Constitutional: Negative for chills, fever, malaise/fatigue and weight loss.   HENT: Negative for hearing loss.    Eyes: Negative for blurred vision.   Respiratory: Negative for cough, hemoptysis, sputum production, shortness of breath and wheezing.    Cardiovascular: Positive for palpitations and orthopnea. Negative for chest pain, claudication, leg swelling and PND.   Gastrointestinal: Negative for abdominal pain, blood in stool, constipation, diarrhea, heartburn, melena, nausea and vomiting.   Genitourinary: Negative for dysuria, flank pain, frequency, hematuria and urgency.   Musculoskeletal: Negative for myalgias.   Neurological: Negative for dizziness, tingling, tremors, sensory change and headaches.   Psychiatric/Behavioral: Negative for depression.       Objective Data:   Physical Exam:   Vitals:   Temp:  [36.1 °C (97 °F)-36.3 °C (97.4 °F)] 36.1 °C (97 °F)  Pulse:  [] 103  Resp:  [12-20] 20  BP: (104-123)/(73-91) 123/91  SpO2:  [92 %-96 %] 94 %     Physical Exam  Constitutional:       General: He is not in acute distress.     Appearance: Normal appearance. He is normal weight. He is not ill-appearing, toxic-appearing or diaphoretic.   HENT:      Head: Normocephalic.      Mouth/Throat:      Mouth: Mucous membranes are  moist.   Eyes:      Extraocular Movements: Extraocular movements intact.      Pupils: Pupils are equal, round, and reactive to light.   Cardiovascular:      Rate and Rhythm: Tachycardia present. Rhythm irregular.      Pulses: Normal pulses.      Heart sounds: Normal heart sounds. No murmur heard.   No friction rub. No gallop.    Pulmonary:      Effort: Pulmonary effort is normal. No respiratory distress.      Breath sounds: Normal breath sounds. No stridor. No wheezing, rhonchi or rales.   Chest:      Chest wall: No tenderness.   Abdominal:      General: Abdomen is flat. There is no distension.      Palpations: Abdomen is soft. There is no mass.      Tenderness: There is no abdominal tenderness. There is no guarding or rebound.      Hernia: No hernia is present.   Musculoskeletal:         General: No swelling, tenderness, deformity or signs of injury.      Right lower leg: No edema.      Left lower leg: No edema.   Skin:     Capillary Refill: Capillary refill takes less than 2 seconds.      Coloration: Skin is not jaundiced or pale.      Findings: No bruising, erythema, lesion or rash.   Neurological:      General: No focal deficit present.      Mental Status: He is alert and oriented to person, place, and time. Mental status is at baseline.   Psychiatric:         Mood and Affect: Mood normal.         Behavior: Behavior normal.           Labs:   Reviewed     Imaging:   Reviewed     Problem Representation:  Patient is a 75 y.o male w/ PMH of Afib and HFrEF of 25% who presents with persistent palpitations, found to be in Afib with RVR, who will be going for cardioversion on 6/11/2021.     * Paroxysmal atrial fibrillation (HCC)- (present on admission)  Assessment & Plan  Patient has hx of A fib, sent to ED by cardiologist's office when he was in A fib with RVR at appointment. No history of prior MI. No Etoh/drug abuse. No recent medication changes or over the counter supplements. TSH WNL.     Plan:   -Digoxin 500 mcg  given once  -continue apixaban  -metoprolol tartrate 25 mg bid  -DC cardioversion today with PORTILLO  -TSH level ordered        Smoking  Assessment & Plan  Patient has smoked 1 cigar a day for 60 years. Smoking is the most modifiable risk factor for his comorbidities so he will benefit from smoking cessation counseling.    -Patient should be counseled on smoking cessation while inpatient, and if indicated be set up with outpatient smoking cessation support    LA thrombus  Assessment & Plan  Echo from 6/2/21 shows presence of left atrial mass or thrombus. Patient will be evaluated by PORTILLO today but if further imaging is needed consider CT or MRI for further definition of mass. If thrombus present, patient may need bridge therapy    HFrEF (heart failure with reduced ejection fraction) (HCC)  Assessment & Plan  Last echo from 6/2/21 shows EF of 25%. Echo also showed severe AS vs pseudostenosis. On this admission proBNP was 3931    -PORTILLO tomorrow to evaluate AS vs pseudostenosis  -start ACE inhibitor and spironolactone after cardioversion today

## 2021-06-11 NOTE — PROGRESS NOTES
Pt returned to unit via gurney from PACU. Per recovery RN, patient converted back to Afib during transport. Rhythm on zoll appears to be Afib.Tele-monitor placed and monitor room notified.  Monitor room confirmed, Afib 108. Stat EKG ordered and Cardiology OTILIA Concepcion updated. Patient is drowsy but awakes to voice. Reports no chest pain or difficulty breathing.

## 2021-06-11 NOTE — CARE PLAN
Problem: Knowledge Deficit - Standard  Goal: Patient and family/care givers will demonstrate understanding of plan of care, disease process/condition, diagnostic tests and medications  Outcome: Progressing     Problem: Fall Risk  Goal: Patient will remain free from falls  Outcome: Progressing     The patient is Watcher - Medium risk of patient condition declining or worsening    Shift Goals  Clinical Goals: Monitor HR, NPO at midnight   Patient Goals: rest    Progress made toward(s) clinical / shift goals:  Progressing

## 2021-06-11 NOTE — ASSESSMENT & PLAN NOTE
TTE from 6/2/21 shows presence of left atrial mass or thrombus. PORTILLO done on 6/11/21 showed no clots in left atrium or appendage.

## 2021-06-11 NOTE — PROCEDURES
PROCEDURE IN DETAIL: The procedure was explained to the patient with risks and benefits including risk of stroke. The patient understands. The patient had already a transesophageal echocardiogram showing no left atrial appendage thrombus or thrombus in the left atrium. There was no spontaneous echocardiogram contrast noticed. The patient was already adequately sedated from the PORTILLO. The pads applied in the anterior and posterior approach. With synchronized biphasic waveform at 200J, one shock was successful in restoring sinus rhythm.  The patient had no immediate post-procedure complications. The rhythm was maintained and 12-lead EKG was requested.    IMPRESSION: Successful direct current cardioversion with restoration of sinus rhythm from atrial fibrillation with no immediate complication.

## 2021-06-11 NOTE — PROGRESS NOTES
Assumed care of pt. Bedside report received from RICHARD Keenan. Pt was updated on plan of care. Call light, phone and personal belongings within reach. Bed locked in lowest position, 3 side rails up, bed alarm on and working appropriately.

## 2021-06-11 NOTE — PROGRESS NOTES
12 lead EKG obtained. Cardiology notified of result, Atrial Fibrillation. No new orders at this time.

## 2021-06-11 NOTE — DISCHARGE PLANNING
Anticipated Discharge Disposition: home    Action: Patient discussed in IDT rounds. He is not medically cleared. No discharge needs anticipated.    Barriers to Discharge: medical clearance    Plan: Case coordination to f/u with patient and treatment team for discharge planning support

## 2021-06-11 NOTE — HEART FAILURE PROGRAM
Primary reason for admission is atrial fibrillation with RVR but patient is also found to be in exacerbation of his known heart failure with reduced ejection fraction (HFrEF).    This is a 30 day all cause readmission, patient was just discharged for index heart failure admission on 6/5/21 when his HF was discovered. Cardiology was not consulted for that new diagnosis, nor for the atrial fibrillation.    Patient was sent from the heart failure clinic to the ER yesterday as he said he felt unwell since his discharge and was dizzy, fatigued, orthopneic, SOB, and his HR was in the 140's. He had stopped taking his BB due to not feeling well.    Nursing: please complete the HF Discharge Checklist (pink sheet in hard chart) and have it cosigned by your charge RN before patient leaves the hospital.    Providers: below are all Guideline Directed Medical Therapy (GDMT) indicated for HFrEF. If any can not be prescribed by discharge, please note the clinical reason for each in your discharge summary.    QUICK SUMMARY OF HFrEF MEASURES    -- Evidence Based Beta Blocker (bisoprolol, carvedilol, or toprol xl), for EF of 40% or less  -- KELLEY - I, for EF of 40% or less  -- Aldosterone antagonist, for EF of 35% or less  -- Anticoagulation for atrial arrhythmia, if applicable  -- Glycemic control for DM + HF, if diabetic  -- Lipid lowering medication for DM + HF, if diabetic  -- Hydralazine dinitrate, if pt self identifies as   -- Pneumococcal vaccine if not previously received  -- Influenza vaccine if not previously received for the current flu season  -- Smoking cessation counseling documented if applicable  -- Device screening if applicable    Daily Nurse: please begin to fill out the HF checklist (pink sheet in hard chart) and use it to guide your daily care.    Discharge Nurse: please ensure completeness of the HF checklist (pink sheet in hard chart) and have it co-signed by the charge RN before the patient leaves  the hospital.    Thank you, Gayle Cardio RN Navigator [phone]      DETAILED EXPLANATION OF HF MEASURES:  1. Documentation of LV systolic function (echo or cath) PTA, during this hospitalization, or plan to assess post discharge or reason for not assessing documented  2. Documentation of fluid intake and urine output every nursing shift  3. 2 hour post diuretic assessment documented 2 hours after diuretic given  4. HF Patient Education using the Living Well With Heart Failure Booklet and Symptom Tracker documented every nursing shift  5. Nutrition consult for diet education  6. Daily weights (one weight documented every 24 hours) on a standing scale unless standing is contraindicated in which case bed scale can be used - have patient write weight on symptom tracker  7. For LVEF less than or equal to 40%, ACE-I, ARNI or ARB prescribed at discharge   8. For LVEF less than or equal to 40%, an Evidence Based Beta Blocker (bisoprolol, carvedilol, toprol xl) must be prescribed at discharge  9. For LVEF less than or equal to 35% aldosterone blockade prescribed at discharge  10. The combination of hydralazine and isosorbide dinitrate is recommended to reduce morbidity and mortality for patients self-described  Americans with NYHA class III-IV HFrEF (EF 40% or less), receiving optimal therapy with ACE inhibitors and beta blockers, unless contraindicated (Class I, IQRA: A).  11. If a HF patient is diabetic or is newly diagnosed with DM: prescribed diabetes treatment at discharge in the form of glycemic control (diet or anti-hyperglycemic medication) or f/u appointment for diabetes management scheduled at discharge.  12. If a HF patient has diabetes: prescribed lipid lowering medication at discharge  13. Documented smoking cessation advice or counseling  14. If a HF patient has a-fib: anticoagulation is prescribed upon discharge or contraindication is documented  15. Screening for and administering immunizations as long  as no contraindications: Pneumonia (regardless of age) and Influenza  16. Written discharge instructions include:  ? Daily weights  ? Record weight on tracker  ? Bring tracker to appointments  ? Call MD for weight gain of 3lb /day or 5lb/week  ? HF medication teaching  ? Low sodium diet  ? Follow up appointment within seven calendar days of d/c must include: date, time and location  ? Activity  ? Worsening symptoms    What if any of the above HF measures are contraindicated?  ? Request that the discharging provider document the medication/intervention and the contraindication specifically in a progress note  ? For example: “no CHF meds due to hypotension” is not enough. It needs to say: “No ACE-I, ARNI, ARB due to hypotension”; “No Beta Blockade due to bradycardia”…

## 2021-06-11 NOTE — OR NURSING
1345  RECEIVED PATIENT FROM ECHO.  REPORT FROM HALEIGH RAMOS.  RESPIRATIONS ARE EVEN AND UNLABORED.  SINUS 93.  NO DISTRESS NOTED.      1400  EKG AT BEDSIDE.    1441  TRANSFERRED TO [place] [address] BED 2, VIA GURNEY, ON 02 2 LITERS PER NASAL CANNULA, ON MONITOR WITH RN.  REPORT TO ALISON RAMOS.

## 2021-06-11 NOTE — ASSESSMENT & PLAN NOTE
Patient has smoked 1 cigar per day for 60 years. Smoking is the most modifiable risk factor for his comorbidities so he will benefit from smoking cessation counseling. Patient should be counseled on smoking cessation while inpatient, and if indicated be set up with outpatient smoking cessation support.

## 2021-06-11 NOTE — ASSESSMENT & PLAN NOTE
Chronic, sent to the ED from his Cardiologist's office when he was found to be in a-fib with RVR. No history of prior MI. Denies history of substance abuse, denies using alcohol recently, denies using OTC medications or herbal supplements, denies recent medication changes.   TSH normal, UDS and BAL negative. Underwent PORTILLO and DCCV on 6/11/21. PORTILLO was significant for MR, severely reduced LV systolic function, no clots, and moderate-severe AS. Cardiac cath scheduled on 6/12/21 for further workup. The patient restoration of SR with DCCV but later converted back to a-fib per tele monitoring and repeat EKG.     Plan:   Continue apixaban  Metoprolol tartrate 25 mg BID  Cardiac cath + possible DCCV  Cardiology following, appreciate recommendations

## 2021-06-11 NOTE — PROGRESS NOTES
Assumed care of patient at bedside report from NOC RN. Updated on POC. Patient currently A & O x 4; on room air; up standby assist; without complaints of acute pain. Patient reports breathing easier and no chest pain this AM. Patient is Aflutter on monitor. Call light within reach. Whiteboard updated. Fall precautions in place. Bed locked and in lowest position. All questions answered. No other needs indicated at this time.

## 2021-06-11 NOTE — H&P
History & Physical Note    Date of Admission: 6/10/2021  Admission Status: Inpatient  Attending: Patty Pederson M.D.   Senior Resident: Dr. Robertson  Intern: Dr. Joya  Contact Number: [phone]    Chief Complaint: sent by physician because of A fib with RVR at outpatient office    History of Present Illness (HPI):   Rajesh is a 75 y.o. 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 He reports a two week hx of intermittent dyspnea and dizziness and recently was hospitalized at [facility] 6/1 to 6/5 for A fib with RVR and CHF. In the last 7d he has had palpitations and increased dyspnea while walking. He denies syncope, chest pain, or orthopnea.     Review of Systems:   Review of Systems   Constitutional: Negative for chills and fever.   HENT: Negative for hearing loss and sore throat.    Eyes: Negative for double vision and photophobia.   Respiratory: Positive for shortness of breath. Negative for cough.    Cardiovascular: Positive for palpitations. Negative for chest pain and orthopnea.   Gastrointestinal: Negative for abdominal pain, nausea and vomiting.   Genitourinary: Negative for dysuria and urgency.   Musculoskeletal: Negative for joint pain and neck pain.   Skin: Negative for itching and rash.   Neurological: Positive for dizziness. Negative for focal weakness, loss of consciousness and headaches.   Endo/Heme/Allergies: Negative for polydipsia. Does not bruise/bleed easily.   Psychiatric/Behavioral: Negative for memory loss and substance abuse.     Past Medical History:   Past Medical History was reviewed with patient.   has a past medical history of A-fib (HCC), Cancer (HCC) (2019), and Hypertension.    Past Surgical History: hx of R knee surgery   Past Surgical History was reviewed with  patient.   has no past surgical history on file.    Medications: Medications have been reviewed with patient.  Prior to Admission Medications   Prescriptions Last Dose Informant Patient Reported? Taking?   apixaban (ELIQUIS) 5mg Tab 6/10/2021 at AM Patient Yes No   Sig: Take 5 mg by mouth 2 times a day.   atorvastatin (LIPITOR) 20 MG Tab 6/10/2021 at AM Patient No No   Sig: Take 1 Tab by mouth every day.   furosemide (LASIX) 40 MG Tab 6/10/2021 at AM Patient No No   Sig: Take 1 tablet by mouth every day for 30 days.   metoprolol tartrate (LOPRESSOR) 100 MG Tab NOT TAKING at NOT TAKING Patient No No   Sig: Take 1 tablet by mouth 2 times a day.   Patient not taking: Reported on 6/10/2021   potassium chloride SA (KDUR) 20 MEQ Tab CR 6/10/2021 at AM Patient Yes No   Sig: Take 20 mEq by mouth every day.   triamcinolone acetonide (KENALOG) 0.1 % Cream PRN at PRN Patient No No   Sig: Apply 1 Application to affected area(s) 2 times a day.   Patient taking differently: Apply 1 Application topically 2 times a day as needed.      Facility-Administered Medications: None        Allergies: Allergies have been reviewed with patient.  Allergies   Allergen Reactions   • Penicillins Unspecified     Last dose  > 60 years ago         Family History: denies FH of disease  family history is not on file.     Social History:   Tobacco:  Smokes 1 cigar a day for 60 y  Alcohol: drinks 1 beer a day  Recreational drugs (illegal and prescription):  Denies drug use  Employment: retired salesman  Activity Level: moderately active  Living situation:  Lives by himself at a [facility]  Recent travel:  none  Primary Care Provider: not reviewed Pcp Pt States None  Other (stressors, spirituality, exposures):  none  Physical Exam:   Vitals:  Temp:  [35.6 °C (96.1 °F)-36.4 °C (97.5 °F)] 36.1 °C (97 °F)  Pulse:  [] 85  Resp:  [12-27] 15  BP: (105-154)/(73-94) 105/73  SpO2:  [90 %-96 %] 96 %    Physical Exam  Constitutional:        General: He is not in acute distress.  HENT:      Head: Normocephalic and atraumatic.      Mouth/Throat:      Pharynx: No oropharyngeal exudate or posterior oropharyngeal erythema.      Comments: poor dentition  Eyes:      General: No scleral icterus.     Extraocular Movements: Extraocular movements intact.      Pupils: Pupils are equal, round, and reactive to light.   Cardiovascular:      Rate and Rhythm: Tachycardia present. Rhythm irregular.      Heart sounds: No murmur heard.     Pulmonary:      Effort: No respiratory distress.      Breath sounds: No rales.   Abdominal:      General: There is no distension.      Palpations: Abdomen is soft.      Tenderness: There is no abdominal tenderness.   Musculoskeletal:      Right lower leg: No edema.      Left lower leg: No edema.   Skin:     Findings: No erythema or rash.   Neurological:      General: No focal deficit present.      Mental Status: He is alert and oriented to person, place, and time.   Psychiatric:         Mood and Affect: Mood normal.         Behavior: Behavior normal.         Labs:   Recent Labs     06/10/21  0957   WBC 7.1   RBC 4.85   HEMOGLOBIN 15.2   HEMATOCRIT 47.3   MCV 97.5   MCH 31.3   RDW 51.6*   PLATELETCT 171   MPV 11.2   NEUTSPOLYS 69.30   LYMPHOCYTES 18.40*   MONOCYTES 8.80   EOSINOPHILS 2.30   BASOPHILS 0.80     Recent Labs     06/10/21  0957   SODIUM 141   POTASSIUM 3.9   CHLORIDE 106   CO2 22   GLUCOSE 108*   BUN 26*     Recent Labs     06/10/21  0957   CREATININE 1.26       EKG: Per my read, CARLOS fib    Imaging:   DX-CHEST-PORTABLE (1 VIEW)   Final Result      No acute cardiac or pulmonary abnormalities are identified.          Previous Data Review: reviewed    Problem Representation: This is a 74 yo M who presents today with atrial fibrillation with RVR. He has recently been started on a beta blocker but was not able to tolerate the regimen. He will be cardioverted tomorrow and will require close outpatient follow up to maintain compliance  with his CHF and A fib regimen in order to reduce the risk of rehospitalization.    * Paroxysmal atrial fibrillation (HCC)- (present on admission)  Assessment & Plan  Patient has hx of A fib, sent to ED by cardiologist's office when he was in A fib with RVR at appointment.    -Digoxin 500 mcg given once  -continue apixaban  -metoprolol tartrate 25 mg bid  -DC cardioversion tmw, NPO at midnight tonight  -TSH level ordered      HFrEF (heart failure with reduced ejection fraction) ([facility])  Assessment & Plan  Last echo from 6/2/21 shows EF of 25%. Echo also showed severe AS vs pseudostenosis. On this admission proBNP was 3931    -PORTILLO tomorrow to evaluate AS vs pseudostenosis  -start ACE inhibitor and spironolactone after cardioversion tomorrow    Smoking  Assessment & Plan  Patient has smoked 1 cigar a day for 60 years. Smoking is the most modifiable risk factor for his comorbidities so he will benefit from smoking cessation counseling.    -Patient should be counseled on smoking cessation while inpatient, and if indicated be set up with outpatient smoking cessation support    LA thrombus  Assessment & Plan  Echo from 6/2/21 shows presence of left atrial mass or thrombus. Patient will be evaluated by PORTILLO tmw but if further imaging is needed consider CT or MRI for further definition of mass. If thrombus present, patient may need bridge therapy

## 2021-06-11 NOTE — ASSESSMENT & PLAN NOTE
6/2/21 TTE showed EF 25% with severe AS versus pseudostenosis. proBNP 3931 on admission, compared with 4828 on 6/2/21. PORTILLO done prior to DCCV for a-fib showed moderate MR, severely reduced LV systolic function, and moderate to severe AS.     Plan:  Cardiac cath today, 6/12/21  Will need to be started on ACEi and spironolactone prior to discharge  Cardiology following, appreciate recommendations; possible ICD?

## 2021-06-11 NOTE — CARE PLAN
The patient is Watcher - Medium risk of patient condition declining or worsening    Shift Goals  Clinical Goals: Monitor HR, NPO at midnight   Patient Goals: rest    Progress made toward(s) clinical / shift goals:  Continue to maintain NPO status for procedure today, updated on today's POC, provided therapeutic communication. Fall precautions in place and education provided.     Problem: Knowledge Deficit - Standard  Goal: Patient and family/care givers will demonstrate understanding of plan of care, disease process/condition, diagnostic tests and medications  6/11/2021 0732 by Raina Feliz R.N.  Outcome: Progressing     Problem: Fluid Volume  Goal: Fluid volume balance will be maintained  6/11/2021 0732 by Raina Feliz R.N.  Outcome: Progressing    Problem: Respiratory  Goal: Patient will achieve/maintain optimum respiratory ventilation and gas exchange  6/11/2021 0732 by Raina Feliz R.N.  Outcome: Progressing       Problem: Fall Risk  Goal: Patient will remain free from falls  6/11/2021 0732 by Raina Feliz R.N.  Outcome: Progressing

## 2021-06-11 NOTE — PROGRESS NOTES
Cardiology Follow Up Progress Note    Date of Service  6/11/2021    Attending Physician  Dwayne Aponte M.D.    Chief Complaint   Dyspnea, orthopnea, fatigue, dizziness    HPI  Rajesh De Luna is a 75 y.o. male past medical history significant for chronic atrial fibrillation, on Eliquis, severe cardiomyopathy LVEF 25% recent diagnosis by echocardiogram also showing possible LA thrombus 6/2/2021, alcohol use, alcohol withdrawal last admission 6/2021 required CIWA protocol, lip cancer status post radiation therapy 2 years ago, hypertension presented with worsening dyspnea with exertion.    Interim Events    No overnight cardiac events  Remains in atrial fibrillation, rate well controlled on low-dose metoprolol.  Labs reviewed  Sodium, potassium, creatinine stable  /86  Remains n.p.o.  Possible PORTILLO cardioversion today        Review of Systems  Review of Systems   Respiratory: Positive for shortness of breath. Negative for apnea, cough, choking, chest tightness, wheezing and stridor.         Intermittent dyspnea with exertion       Vital signs in last 24 hours  Temp:  [35.6 °C (96.1 °F)-36.4 °C (97.5 °F)] 36.1 °C (97 °F)  Pulse:  [] 91  Resp:  [12-27] 14  BP: (104-154)/(73-94) 115/86  SpO2:  [90 %-96 %] 95 %    Physical Exam  Physical Exam  Cardiovascular:      Rate and Rhythm: Tachycardia present. Rhythm irregular.   Pulmonary:      Effort: Pulmonary effort is normal.   Skin:     General: Skin is warm.   Neurological:      Mental Status: He is alert. Mental status is at baseline.   Psychiatric:         Mood and Affect: Mood normal.         Lab Review  Lab Results   Component Value Date/Time    WBC 6.1 06/11/2021 01:19 AM    RBC 4.72 06/11/2021 01:19 AM    HEMOGLOBIN 15.1 06/11/2021 01:19 AM    HEMATOCRIT 46.1 06/11/2021 01:19 AM    MCV 97.7 06/11/2021 01:19 AM    MCH 32.0 06/11/2021 01:19 AM    MCHC 32.8 (L) 06/11/2021 01:19 AM    MPV 11.3 06/11/2021 01:19 AM      Lab Results   Component Value Date/Time     SODIUM 144 06/11/2021 01:19 AM    POTASSIUM 4.2 06/11/2021 01:19 AM    CHLORIDE 108 06/11/2021 01:19 AM    CO2 22 06/11/2021 01:19 AM    GLUCOSE 103 (H) 06/11/2021 01:19 AM    BUN 24 (H) 06/11/2021 01:19 AM    CREATININE 1.14 06/11/2021 01:19 AM      Lab Results   Component Value Date/Time    ASTSGOT 21 06/11/2021 01:19 AM    ALTSGPT 19 06/11/2021 01:19 AM     Lab Results   Component Value Date/Time    TROPONINT 29 (H) 06/10/2021 09:57 AM       Recent Labs     06/10/21  0957   NTPROBNP 3931*       Cardiac Imaging and Procedures Review  EKG: Atrial fib, rate 148    Echocardiogram:  Severely reduced left ventricular systolic function.  Global hypokinesis.  Reduced right ventricular systolic function.  Severely dilated left atrium - prominence of the posterior LA wall-   possible thrombus/mass lesion- consider further evaluation with PORTILLO or   CT/MRI  Mild mitral regurgitation.  Severe calcification of aortic valve leaflets with reduced motion, no aortic stenosis, no AI          Cardiac Catheterization: Pending    Imaging  Chest X-Ray: No acute cardiopulmonary abnormalities    Stress Test: Not applicable    Assessment/Plan    Symptomatic A. fib RVR, rate 140s  History of chronic A. Fib  -On Eliquis  -On metoprolol, received dig load on admission  -Rate well controlled  -Keep n.p.o.  -Potential PORTILLO cardioversion this afternoon    LA  Thrombus vs mass by echo 6/2/2021  -Remains on Eliquis  -Plan for potential PORTILLO this afternoon      Severe cardiomyopathy, LVEF 25% ( 6/2/21)  History of alcohol use  -We will plan for ischemic work-up this admission.  -GDMT  -Will evaluate start of HF meds post DCCV         Acute decompensated heart failure  -NR proBNP 3900  -Daily standing weight   -strict intake and output  - fluid discretion      Cardiology will follow up    Thank you for allowing me to participate in the care of this patient.      Please contact me with any questions.    DACIA De La Torre   Cardiologist, [facility]  [phone]

## 2021-06-12 ENCOUNTER — APPOINTMENT (OUTPATIENT)
Dept: CARDIOLOGY | Facility: MEDICAL CENTER | Age: 76
DRG: 246 | End: 2021-06-12
Attending: NURSE PRACTITIONER
Payer: MEDICARE

## 2021-06-12 LAB
ANION GAP SERPL CALC-SCNC: 12 MMOL/L (ref 7–16)
BUN SERPL-MCNC: 23 MG/DL (ref 8–22)
CALCIUM SERPL-MCNC: 9.1 MG/DL (ref 8.5–10.5)
CHLORIDE SERPL-SCNC: 106 MMOL/L (ref 96–112)
CO2 SERPL-SCNC: 22 MMOL/L (ref 20–33)
CREAT SERPL-MCNC: 1.13 MG/DL (ref 0.5–1.4)
EKG IMPRESSION: NORMAL
GLUCOSE SERPL-MCNC: 89 MG/DL (ref 65–99)
POTASSIUM SERPL-SCNC: 4 MMOL/L (ref 3.6–5.5)
SODIUM SERPL-SCNC: 140 MMOL/L (ref 135–145)

## 2021-06-12 PROCEDURE — 700102 HCHG RX REV CODE 250 W/ 637 OVERRIDE(OP): Performed by: NURSE PRACTITIONER

## 2021-06-12 PROCEDURE — 99233 SBSQ HOSP IP/OBS HIGH 50: CPT | Mod: 25 | Performed by: INTERNAL MEDICINE

## 2021-06-12 PROCEDURE — B2151ZZ FLUOROSCOPY OF LEFT HEART USING LOW OSMOLAR CONTRAST: ICD-10-PCS | Performed by: INTERNAL MEDICINE

## 2021-06-12 PROCEDURE — 700102 HCHG RX REV CODE 250 W/ 637 OVERRIDE(OP): Performed by: INTERNAL MEDICINE

## 2021-06-12 PROCEDURE — 36415 COLL VENOUS BLD VENIPUNCTURE: CPT

## 2021-06-12 PROCEDURE — 93458 L HRT ARTERY/VENTRICLE ANGIO: CPT | Mod: 26,59 | Performed by: INTERNAL MEDICINE

## 2021-06-12 PROCEDURE — 700102 HCHG RX REV CODE 250 W/ 637 OVERRIDE(OP): Performed by: STUDENT IN AN ORGANIZED HEALTH CARE EDUCATION/TRAINING PROGRAM

## 2021-06-12 PROCEDURE — 700102 HCHG RX REV CODE 250 W/ 637 OVERRIDE(OP)

## 2021-06-12 PROCEDURE — 4A023N7 MEASUREMENT OF CARDIAC SAMPLING AND PRESSURE, LEFT HEART, PERCUTANEOUS APPROACH: ICD-10-PCS | Performed by: INTERNAL MEDICINE

## 2021-06-12 PROCEDURE — 93010 ELECTROCARDIOGRAM REPORT: CPT | Mod: 59 | Performed by: INTERNAL MEDICINE

## 2021-06-12 PROCEDURE — 92928 PRQ TCAT PLMT NTRAC ST 1 LES: CPT | Mod: LC | Performed by: INTERNAL MEDICINE

## 2021-06-12 PROCEDURE — A9270 NON-COVERED ITEM OR SERVICE: HCPCS | Performed by: STUDENT IN AN ORGANIZED HEALTH CARE EDUCATION/TRAINING PROGRAM

## 2021-06-12 PROCEDURE — 93005 ELECTROCARDIOGRAM TRACING: CPT | Performed by: INTERNAL MEDICINE

## 2021-06-12 PROCEDURE — 99152 MOD SED SAME PHYS/QHP 5/>YRS: CPT | Performed by: INTERNAL MEDICINE

## 2021-06-12 PROCEDURE — A9270 NON-COVERED ITEM OR SERVICE: HCPCS | Performed by: INTERNAL MEDICINE

## 2021-06-12 PROCEDURE — A9270 NON-COVERED ITEM OR SERVICE: HCPCS | Performed by: NURSE PRACTITIONER

## 2021-06-12 PROCEDURE — 770020 HCHG ROOM/CARE - TELE (206)

## 2021-06-12 PROCEDURE — 700111 HCHG RX REV CODE 636 W/ 250 OVERRIDE (IP)

## 2021-06-12 PROCEDURE — 99233 SBSQ HOSP IP/OBS HIGH 50: CPT | Mod: GC | Performed by: HOSPITALIST

## 2021-06-12 PROCEDURE — A9270 NON-COVERED ITEM OR SERVICE: HCPCS

## 2021-06-12 PROCEDURE — 027035Z DILATION OF CORONARY ARTERY, ONE ARTERY WITH TWO DRUG-ELUTING INTRALUMINAL DEVICES, PERCUTANEOUS APPROACH: ICD-10-PCS | Performed by: INTERNAL MEDICINE

## 2021-06-12 PROCEDURE — C1887 CATHETER, GUIDING: HCPCS

## 2021-06-12 PROCEDURE — 700105 HCHG RX REV CODE 258: Performed by: INTERNAL MEDICINE

## 2021-06-12 PROCEDURE — 700117 HCHG RX CONTRAST REV CODE 255: Performed by: INTERNAL MEDICINE

## 2021-06-12 PROCEDURE — B2111ZZ FLUOROSCOPY OF MULTIPLE CORONARY ARTERIES USING LOW OSMOLAR CONTRAST: ICD-10-PCS | Performed by: INTERNAL MEDICINE

## 2021-06-12 PROCEDURE — 700101 HCHG RX REV CODE 250

## 2021-06-12 PROCEDURE — 80048 BASIC METABOLIC PNL TOTAL CA: CPT

## 2021-06-12 RX ORDER — LISINOPRIL 5 MG/1
2.5 TABLET ORAL
Status: DISCONTINUED | OUTPATIENT
Start: 2021-06-12 | End: 2021-06-12

## 2021-06-12 RX ORDER — HEPARIN SODIUM 1000 [USP'U]/ML
INJECTION, SOLUTION INTRAVENOUS; SUBCUTANEOUS
Status: COMPLETED
Start: 2021-06-12 | End: 2021-06-12

## 2021-06-12 RX ORDER — CLOPIDOGREL 300 MG/1
TABLET, FILM COATED ORAL
Status: COMPLETED
Start: 2021-06-12 | End: 2021-06-12

## 2021-06-12 RX ORDER — METOPROLOL SUCCINATE 50 MG/1
50 TABLET, EXTENDED RELEASE ORAL EVERY EVENING
Status: DISCONTINUED | OUTPATIENT
Start: 2021-06-12 | End: 2021-06-13 | Stop reason: HOSPADM

## 2021-06-12 RX ORDER — LISINOPRIL 5 MG/1
5 TABLET ORAL
Status: DISCONTINUED | OUTPATIENT
Start: 2021-06-12 | End: 2021-06-13 | Stop reason: HOSPADM

## 2021-06-12 RX ORDER — CLOPIDOGREL BISULFATE 75 MG/1
75 TABLET ORAL DAILY
Status: DISCONTINUED | OUTPATIENT
Start: 2021-06-13 | End: 2021-06-13 | Stop reason: HOSPADM

## 2021-06-12 RX ORDER — LIDOCAINE HYDROCHLORIDE 20 MG/ML
INJECTION, SOLUTION INFILTRATION; PERINEURAL
Status: COMPLETED
Start: 2021-06-12 | End: 2021-06-12

## 2021-06-12 RX ORDER — BIVALIRUDIN 250 MG/5ML
INJECTION, POWDER, LYOPHILIZED, FOR SOLUTION INTRAVENOUS
Status: COMPLETED
Start: 2021-06-12 | End: 2021-06-12

## 2021-06-12 RX ORDER — ASPIRIN 81 MG/1
TABLET, CHEWABLE ORAL
Status: COMPLETED
Start: 2021-06-12 | End: 2021-06-12

## 2021-06-12 RX ORDER — ATORVASTATIN CALCIUM 40 MG/1
40 TABLET, FILM COATED ORAL DAILY
Status: DISCONTINUED | OUTPATIENT
Start: 2021-06-13 | End: 2021-06-13 | Stop reason: HOSPADM

## 2021-06-12 RX ORDER — SPIRONOLACTONE 25 MG/1
25 TABLET ORAL
Status: DISCONTINUED | OUTPATIENT
Start: 2021-06-12 | End: 2021-06-13 | Stop reason: HOSPADM

## 2021-06-12 RX ORDER — SODIUM CHLORIDE 9 MG/ML
INJECTION, SOLUTION INTRAVENOUS CONTINUOUS
Status: DISCONTINUED | OUTPATIENT
Start: 2021-06-12 | End: 2021-06-13

## 2021-06-12 RX ORDER — VERAPAMIL HYDROCHLORIDE 2.5 MG/ML
INJECTION, SOLUTION INTRAVENOUS
Status: COMPLETED
Start: 2021-06-12 | End: 2021-06-12

## 2021-06-12 RX ORDER — CLOPIDOGREL 300 MG/1
600 TABLET, FILM COATED ORAL ONCE
Status: DISCONTINUED | OUTPATIENT
Start: 2021-06-12 | End: 2021-06-12

## 2021-06-12 RX ORDER — MIDAZOLAM HYDROCHLORIDE 1 MG/ML
INJECTION INTRAMUSCULAR; INTRAVENOUS
Status: COMPLETED
Start: 2021-06-12 | End: 2021-06-12

## 2021-06-12 RX ORDER — HEPARIN SODIUM 200 [USP'U]/100ML
INJECTION, SOLUTION INTRAVENOUS
Status: COMPLETED
Start: 2021-06-12 | End: 2021-06-12

## 2021-06-12 RX ADMIN — SODIUM CHLORIDE: 9 INJECTION, SOLUTION INTRAVENOUS at 18:07

## 2021-06-12 RX ADMIN — VERAPAMIL HYDROCHLORIDE 5 MG: 2.5 INJECTION, SOLUTION INTRAVENOUS at 14:54

## 2021-06-12 RX ADMIN — BIVALIRUDIN 0.2 MG/KG/HR: 250 INJECTION, POWDER, LYOPHILIZED, FOR SOLUTION INTRAVENOUS at 15:23

## 2021-06-12 RX ADMIN — CLOPIDOGREL BISULFATE 600 MG: 300 TABLET, FILM COATED ORAL at 15:22

## 2021-06-12 RX ADMIN — BIVALIRUDIN 250 MG: 250 INJECTION, POWDER, LYOPHILIZED, FOR SOLUTION INTRAVENOUS at 15:05

## 2021-06-12 RX ADMIN — ASPIRIN 81 MG CHEWABLE TABLET 324 MG: 81 TABLET CHEWABLE at 14:45

## 2021-06-12 RX ADMIN — MIDAZOLAM HYDROCHLORIDE 1 MG: 1 INJECTION, SOLUTION INTRAMUSCULAR; INTRAVENOUS at 15:07

## 2021-06-12 RX ADMIN — METOPROLOL SUCCINATE 50 MG: 50 TABLET, EXTENDED RELEASE ORAL at 18:04

## 2021-06-12 RX ADMIN — HEPARIN SODIUM: 1000 INJECTION, SOLUTION INTRAVENOUS; SUBCUTANEOUS at 14:54

## 2021-06-12 RX ADMIN — LIDOCAINE HYDROCHLORIDE: 20 INJECTION, SOLUTION INFILTRATION; PERINEURAL at 14:54

## 2021-06-12 RX ADMIN — ATORVASTATIN CALCIUM 20 MG: 20 TABLET, FILM COATED ORAL at 04:48

## 2021-06-12 RX ADMIN — METOPROLOL TARTRATE 25 MG: 25 TABLET, FILM COATED ORAL at 04:48

## 2021-06-12 RX ADMIN — LISINOPRIL 5 MG: 5 TABLET ORAL at 18:03

## 2021-06-12 RX ADMIN — IOHEXOL 180 ML: 350 INJECTION, SOLUTION INTRAVENOUS at 15:30

## 2021-06-12 RX ADMIN — SPIRONOLACTONE 25 MG: 25 TABLET ORAL at 13:27

## 2021-06-12 RX ADMIN — FENTANYL CITRATE 50 MCG: 50 INJECTION, SOLUTION INTRAMUSCULAR; INTRAVENOUS at 15:07

## 2021-06-12 RX ADMIN — NITROGLYCERIN 10 ML: 20 INJECTION INTRAVENOUS at 14:54

## 2021-06-12 RX ADMIN — HEPARIN SODIUM 2000 UNITS: 200 INJECTION, SOLUTION INTRAVENOUS at 14:54

## 2021-06-12 ASSESSMENT — ENCOUNTER SYMPTOMS
DIARRHEA: 0
STRIDOR: 0
VOMITING: 0
ABDOMINAL PAIN: 0
DIAPHORESIS: 0
COUGH: 0
SENSORY CHANGE: 0
CLAUDICATION: 0
PND: 0
WEIGHT LOSS: 0
CHEST TIGHTNESS: 0
WHEEZING: 0
BLURRED VISION: 0
NAUSEA: 0
CHOKING: 0
FEVER: 0
HEADACHES: 0
ORTHOPNEA: 1
HEMOPTYSIS: 0
SPUTUM PRODUCTION: 0
TREMORS: 0
SHORTNESS OF BREATH: 0
PALPITATIONS: 0
TINGLING: 0
DIZZINESS: 0
CHILLS: 0
APNEA: 0
HEARTBURN: 0

## 2021-06-12 ASSESSMENT — PAIN DESCRIPTION - PAIN TYPE: TYPE: ACUTE PAIN

## 2021-06-12 ASSESSMENT — FIBROSIS 4 INDEX: FIB4 SCORE: 2.16

## 2021-06-12 NOTE — CARE PLAN
The patient is Stable - Low risk of patient condition declining or worsening    Shift Goals  Clinical Goals: Monitor HR, NPO at midnight   Patient Goals: rest    Progress made toward(s) clinical / shift goals:  Progressing.     Patient is not progressing towards the following goals:      Problem: Knowledge Deficit - Standard  Goal: Patient and family/care givers will demonstrate understanding of plan of care, disease process/condition, diagnostic tests and medications  Outcome: Progressing     Problem: Hemodynamics  Goal: Patient's hemodynamics, fluid balance and neurologic status will be stable or improve  Outcome: Progressing     Problem: Fluid Volume  Goal: Fluid volume balance will be maintained  Outcome: Progressing

## 2021-06-12 NOTE — PROGRESS NOTES
Monitor Summary  Rhythm: Afib  Rate:   Ectopy: PVC, Bigem  0 / 0.08 / 0    Monitor Strips reviewed.

## 2021-06-12 NOTE — PROGRESS NOTES
[facility] [employer]   Internal Medicine Daily Progress Note      Date of Service: 6/12/2021  Primary Team: [facility] Team   Attending Physician: Dwayne Aponte M.D.   Resident: Eula Knight M.D.   Team Contact: [phone]  Code Status: Full Code      Chief Complaint  Chief Complaint   Patient presents with   • Dizziness   • Shortness of Breath   • Atrial Fibrillation         ID  75-year-old male with PMH of atrial fibrillation and recent diagnosis of HFrEF who returns to the hospital for further evaluation and management of atrial fibrillation with RVR. Cardiology was consulted on admission, the patient was given a dose of digoxin, he was continued on metoprolol 25 twice daily, he was made NPO at midnight for PORTILLO and DCCV on 6/11/21, per Cardiology. Holding CHF medications given cardiac cath and repeat DCCV on 6/12/21 per Cardiology. Continuing apixaban per Cardiology. Normal TSH. 2 of 2 blood cultures NGTD. No electrolyte disturbances. Lactic acidosis resolved. CBC unremarkable.  BAL and UDS negative.       Interval History  Underwent PORTILLO and DCCV yesterday afternoon with restoration of SR and without complications.  Converted back to atrial fibrillation yesterday evening per tele and repeat EKG.   NPO for cardiac cath today.       Review of Systems   Constitutional: Negative for chills, diaphoresis, fever, malaise/fatigue and weight loss.   Eyes: Negative for blurred vision.   Respiratory: Negative for hemoptysis, sputum production, shortness of breath and wheezing.    Cardiovascular: Positive for orthopnea. Negative for chest pain, palpitations, claudication, leg swelling and PND.   Gastrointestinal: Negative for abdominal pain, diarrhea, heartburn, nausea and vomiting.   Genitourinary: Negative for dysuria, frequency, hematuria and urgency.   Neurological: Negative for dizziness, tingling, tremors, sensory change and headaches.         Objective    Vitals:    06/11/21 1628 06/11/21 1931 06/12/21 0027  06/12/21 0445   BP: 119/93 104/68 115/84 110/75   Pulse: (!) 105 84 93 (!) 104   Resp: 16 16 15 17   Temp: 36.1 °C (97 °F) 36.3 °C (97.3 °F) 36.3 °C (97.3 °F) 36.6 °C (97.9 °F)   TempSrc: Temporal Temporal Temporal Temporal   SpO2: 98% 94% 96% 93%   Weight:  81.7 kg (180 lb 1.9 oz)     Height:         Intake/Output Summary (Last 24 hours) at 6/12/2021 0644  Last data filed at 6/11/2021 1441  Gross per 24 hour   Intake 50 ml   Output --   Net 50 ml     Physical Exam  Constitutional:       Appearance: Normal appearance. He is normal weight.   HENT:      Head: Normocephalic and atraumatic.   Cardiovascular:      Rate and Rhythm: Tachycardia present. Rhythm irregular.      Pulses: Normal pulses.      Heart sounds: Normal heart sounds. No murmur heard.   No gallop.    Pulmonary:      Effort: Pulmonary effort is normal. No respiratory distress.      Breath sounds: No stridor. No wheezing or rales.   Abdominal:      General: Abdomen is flat. There is no distension.      Palpations: Abdomen is soft. There is no mass.      Tenderness: There is no abdominal tenderness. There is no guarding or rebound.      Hernia: No hernia is present.   Musculoskeletal:         General: No swelling, tenderness, deformity or signs of injury.      Right lower leg: No edema.      Left lower leg: No edema.   Skin:     Capillary Refill: Capillary refill takes less than 2 seconds.      Coloration: Skin is not jaundiced.      Findings: No bruising or lesion.   Neurological:      General: No focal deficit present.      Mental Status: He is alert and oriented to person, place, and time.   Psychiatric:         Mood and Affect: Mood normal.            Labs    Recent Labs     06/10/21  0957 06/11/21 0119 06/12/21  0236   SODIUM 141 144 140   POTASSIUM 3.9 4.2 4.0   CHLORIDE 106 108 106   CO2 22 22 22   BUN 26* 24* 23*   CREATININE 1.26 1.14 1.13   MAGNESIUM 2.4  --   --    CALCIUM 8.9 8.7 9.1     Recent Labs     06/10/21  0957 06/11/21 0119  06/12/21  0236   ALTSGPT  --  19  --    ASTSGOT  --  21  --    ALKPHOSPHAT  --  132*  --    TBILIRUBIN  --  1.0  --    GLUCOSE 108* 103* 89     Recent Labs     06/10/21  0957 06/10/21  1438 06/11/21  0119   RBC 4.85  --  4.72   HEMOGLOBIN 15.2  --  15.1   HEMATOCRIT 47.3  --  46.1   PLATELETCT 171  --  172   PROTHROMBTM 17.7* 16.7*  --    APTT 33.0  --   --    INR 1.41* 1.40*  --      Recent Labs     06/10/21  0957 06/11/21  0119   WBC 7.1 6.1   NEUTSPOLYS 69.30  --    LYMPHOCYTES 18.40*  --    MONOCYTES 8.80  --    EOSINOPHILS 2.30  --    BASOPHILS 0.80  --    ASTSGOT  --  21   ALTSGPT  --  19   ALKPHOSPHAT  --  132*   TBILIRUBIN  --  1.0       Imaging           Consultants  Cardiology - Dr. Ruiz       Assessment and Plan    HFrEF  Assessment & Plan  6/2/21 TTE showed EF 25% with severe AS versus pseudostenosis. proBNP 3931 on admission, compared with 4828 on 6/2/21. PORTILLO done prior to DCCV for a-fib showed moderate MR, severely reduced LV systolic function, and moderate to severe AS.     Plan:  Cardiac cath today, 6/12/21  Will need to be started on ACEi and spironolactone prior to discharge  Cardiology following, appreciate recommendations; possible ICD?    * Paroxysmal atrial fibrillation  Assessment & Plan  Chronic, sent to the ED from his Cardiologist's office when he was found to be in a-fib with RVR. No history of prior MI. Denies history of substance abuse, denies using alcohol recently, denies using OTC medications or herbal supplements, denies recent medication changes.   TSH normal, UDS and BAL negative. Underwent PORTILLO and DCCV on 6/11/21. PORTILLO was significant for MR, severely reduced LV systolic function, no clots, and moderate-severe AS. Cardiac cath scheduled on 6/12/21 for further workup. The patient restoration of SR with DCCV but later converted back to a-fib per tele monitoring and repeat EKG.     Plan:   Continue apixaban  Metoprolol tartrate 25 mg BID  Cardiac cath + possible DCCV  Cardiology  following, appreciate recommendations     Smoking  Assessment & Plan  Patient has smoked 1 cigar per day for 60 years. Smoking is the most modifiable risk factor for his comorbidities so he will benefit from smoking cessation counseling. Patient should be counseled on smoking cessation while inpatient, and if indicated be set up with outpatient smoking cessation support.    LA thrombus  Assessment & Plan  TTE from 6/2/21 shows presence of left atrial mass or thrombus. PORTILLO done on 6/11/21 showed no clots in left atrium or appendage.

## 2021-06-12 NOTE — PROGRESS NOTES
Cardiology Follow Up Progress Note    Date of Service  6/12/2021    Attending Physician  Dwayne Aponte M.D.    Chief Complaint   Dyspnea, orthopnea, fatigue, dizziness    HPI  Rajesh De Luna is a 75 y.o. 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    Interim Events    No overnight cardiac events, in afib rate ~ 108.  S/p PORTILLO/ DCCV 6/11/2021 successful restoration to SR with recurrence of A. Fib shortly there after.  Labs reviewed  Sodium, potassium, creatinine stable  /80  Keep NPO  Plan for C this afternoon           Review of Systems  Review of Systems   Respiratory: Negative for apnea, cough, choking, chest tightness, shortness of breath, wheezing and stridor.         Intermittent dyspnea with exertion       Vital signs in last 24 hours  Temp:  [35.8 °C (96.5 °F)-36.8 °C (98.2 °F)] 36.7 °C (98 °F)  Pulse:  [] 100  Resp:  [12-20] 15  BP: (104-124)/(68-93) 121/88  SpO2:  [92 %-99 %] 92 %    Physical Exam  Physical Exam  Cardiovascular:      Rate and Rhythm: Normal rate. Rhythm irregular.   Pulmonary:      Effort: Pulmonary effort is normal.   Skin:     General: Skin is warm.   Neurological:      Mental Status: He is alert. Mental status is at baseline.   Psychiatric:         Mood and Affect: Mood normal.         Lab Review  Lab Results   Component Value Date/Time    WBC 6.1 06/11/2021 01:19 AM    RBC 4.72 06/11/2021 01:19 AM    HEMOGLOBIN 15.1 06/11/2021 01:19 AM    HEMATOCRIT 46.1 06/11/2021 01:19 AM    MCV 97.7 06/11/2021 01:19 AM    MCH 32.0 06/11/2021 01:19 AM    MCHC 32.8 (L) 06/11/2021 01:19 AM    MPV 11.3 06/11/2021 01:19 AM      Lab Results    Component Value Date/Time    SODIUM 140 06/12/2021 02:36 AM    POTASSIUM 4.0 06/12/2021 02:36 AM    CHLORIDE 106 06/12/2021 02:36 AM    CO2 22 06/12/2021 02:36 AM    GLUCOSE 89 06/12/2021 02:36 AM    BUN 23 (H) 06/12/2021 02:36 AM    CREATININE 1.13 06/12/2021 02:36 AM      Lab Results   Component Value Date/Time    ASTSGOT 21 06/11/2021 01:19 AM    ALTSGPT 19 06/11/2021 01:19 AM     Lab Results   Component Value Date/Time    TROPONINT 26 (H) 06/11/2021 09:54 AM       Recent Labs     06/10/21  0957   NTPROBNP 3931*       Cardiac Imaging and Procedures Review  EKG: Atrial fib, rate 148    Echocardiogram:  Severely reduced left ventricular systolic function.  Global hypokinesis.  Reduced right ventricular systolic function.  Severely dilated left atrium - prominence of the posterior LA wall-   possible thrombus/mass lesion- consider further evaluation with PORTILLO or   CT/MRI  Mild mitral regurgitation.  Severe calcification of aortic valve leaflets with reduced motion, no aortic stenosis, no AI          Cardiac Catheterization: Pending    Imaging  Chest X-Ray: No acute cardiopulmonary abnormalities    Stress Test: Not applicable    Assessment/Plan    Symptomatic A. fib RVR, rate 140s  History of chronic A. Fib  -On Eliquis on hold pending cath.  -s/p DCCV PORTILLO/6/11/2021 successful restoration to SR, with recurrence shortly thereafter.  -Continue rate control with metoprolol.      LA  Thrombus vs mass by echo 6/2/2021  -s/p PORTILLO 6/11/2021 no evidence of SAVI thrombus  -Resume Eliquis post left heart cath      Severe cardiomyopathy, LVEF 25% ( 6/2/21)  History of alcohol use  -Keep n.p.o.  -Plan for [facility] this afternoon  -GDMT  -toprol xl 50, lisinopril         Acute decompensated heart failure  -NR proBNP 3900  -Daily standing weight   -strict intake and output  - fluid discretion      Cardiology will follow up    Thank you for allowing me to participate in the care of this patient.      Please contact me with any  questions.    LAUREN De La Torre.   Cardiologist, [facility]  [phone]

## 2021-06-12 NOTE — PROCEDURES
REFERRING PHYSICIAN: Dr. Ruiz    PREOPERATIVE DIAGNOSIS:  1.  Acute decompensated heart failure with reduced ejection fraction  2.  History of alcohol abuse  3.  Non-STEMI  4.  A. fib RVR    POSTOPERATIVE DIAGNOSIS:  1.  99% culprit LCx stenosis  2.  Calcific 50 to 60% proximal LAD stenosis and moderate nonobstructive RCA disease  3.  LVEDP 16 mmHg  4.  Successful PCI culprit LCx with [place] DONOVAN      PROCEDURE PERFORMED:  Selective coronary angiography of the native vessels  Left heart catheterization  Left ventriculogram  PCI of LCx DONOVAN  Supervision moderate sedation    DESCRIPTION OF PROCEDURE:  The risks and benefits of cardiac catheterization as well as the procedure itself, rationale and appropriateness were discussed with the patient today. Complications including but not limited to death, stroke, MI, urgent bypass surgery, contrast nephropathy, vascular complications, bleeding and infection were explained to the patient. The potential outcomes associated with the procedure (possible PCI, possible CABG, possible medical Rx only) were also discussed at length. The patient agreed to proceed.    The patient was transported to the catheterization laboratory in the fasting state. The right radial area and right groin were prepped and draped in the usual fashion. Right radial area was entered with a single through and through puncture under direct ultrasound guidance and a 6F glide sheath was placed. An intra-arterial cocktail of heparin, verapamil and nitroglycerine was administered. Over a wire, a 5F Sasha catheter was passed to the aortic root and used to engage the right and left coronaries without difficulty. Contrast was administered and multiple images obtained. This catheter was then used to cross the aortic valve for LHC and contrast was administered at 8cc/s for 24cc's for left ventriculogram.     DESCRIPTION OF PCI:  The decision was made to intervene on the culprit coronary artery.  Bivalirudin bolus  infusion was initiated.  The diagnostic catheter was exchanged over a wire for an 6 Turkish EBU 3.5 guide seated appropriately. A run-through 0.014 wire was navigated across the culprit stenosis. A 2.5 mm balloon was used to predilate the lesion. A 2.5 x 12 mm [place] DONOVAN was then positioned and deployed at nominal pressure. Following this a 2.5 mm noncompliant balloon was used to post dilate the stent. There was an excellent angiographic result with ALEC-3 flow and no residual stenosis in the stented segment.  There was however proximal plaque shift resulting in a 70% stenosis was proximal to the previously placed stent.  A second 2.5 mm x 12 mm [place] DONOVAN was then positioned deployed at high pressure with a 16 felix ballooning of the overlap.  Excellent angiographic result with ALEC-3 flow.  All catheters and guidewires were removed and a TR band was applied to achieve patent hemostasis. Patient left the cath lab in stable condition.      Moderate sedation directly monitored by me during the case while supervising the administration of the sedation medication by an independent trained RN to assist in the monitoring of the patient's level of consciousness and physiological status. I, the supervising physician was present the entire time from beginning of medication administration until the end of the procedure from 1452 until 1522. For detailed administration records please see the moderate sedation documentation in the median tab.      FINDINGS:  I. HEMODYNAMICS:    Ao: 109/69 mmHg   LEDP: 16 mmHg   Gradient on LV pullback: Yes, 15 mmHg peak to peak    II.  CORONARY ANGIOGRAPHY:  Left Main: Large caliber bifurcating no CAD.  Left Anterior Descending: Large caliber giving rise to several large diagonals.  There is a heavily calcified 50% proximal stenosis.  Left Circumflex: Moderate caliber supplying several obtuse marginals and lateral branch.  The mid circumflex is notable for 99% focal culprit stenosis.  Post PCI there  is 0% residual stenosis and ALEC-3 flow.  Right Coronary: Large caliber and dominant vessel with a 50% proximal focal stenosis.  Supplies RPDA and RPL B.  The RPDA is notable for a 60% distal stenosis.    COMPLICATIONS: none apparent    RECOMMENDATIONS:  Aspirin, Plavix dual therapy with his anticoagulants until hospital discharge.  After hospital discharge Plavix and Eliquis only due to concomitant AF and elevated bleeding risk.  OMT CAD.  OMT atrial fibrillation and heart failure.    Following the procedure I discussed the results with the patient who indicated there were no family members or contacts that I should discuss the case with.

## 2021-06-13 ENCOUNTER — PHARMACY VISIT (OUTPATIENT)
Dept: PHARMACY | Facility: MEDICAL CENTER | Age: 76
End: 2021-06-13
Payer: MEDICARE

## 2021-06-13 VITALS
HEIGHT: 70 IN | TEMPERATURE: 97 F | RESPIRATION RATE: 18 BRPM | SYSTOLIC BLOOD PRESSURE: 97 MMHG | OXYGEN SATURATION: 93 % | DIASTOLIC BLOOD PRESSURE: 60 MMHG | WEIGHT: 170.42 LBS | BODY MASS INDEX: 24.4 KG/M2 | HEART RATE: 110 BPM

## 2021-06-13 LAB
ANION GAP SERPL CALC-SCNC: 13 MMOL/L (ref 7–16)
BUN SERPL-MCNC: 20 MG/DL (ref 8–22)
CALCIUM SERPL-MCNC: 8.7 MG/DL (ref 8.5–10.5)
CHLORIDE SERPL-SCNC: 107 MMOL/L (ref 96–112)
CHOLEST SERPL-MCNC: 108 MG/DL (ref 100–199)
CO2 SERPL-SCNC: 19 MMOL/L (ref 20–33)
CREAT SERPL-MCNC: 1.05 MG/DL (ref 0.5–1.4)
ERYTHROCYTE [DISTWIDTH] IN BLOOD BY AUTOMATED COUNT: 51.1 FL (ref 35.9–50)
GLUCOSE SERPL-MCNC: 87 MG/DL (ref 65–99)
HCT VFR BLD AUTO: 48.6 % (ref 42–52)
HDLC SERPL-MCNC: 40 MG/DL
HGB BLD-MCNC: 16.4 G/DL (ref 14–18)
LDLC SERPL CALC-MCNC: 53 MG/DL
MCH RBC QN AUTO: 32.3 PG (ref 27–33)
MCHC RBC AUTO-ENTMCNC: 33.7 G/DL (ref 33.7–35.3)
MCV RBC AUTO: 95.9 FL (ref 81.4–97.8)
PLATELET # BLD AUTO: 162 K/UL (ref 164–446)
PMV BLD AUTO: 11 FL (ref 9–12.9)
POTASSIUM SERPL-SCNC: 4.1 MMOL/L (ref 3.6–5.5)
RBC # BLD AUTO: 5.07 M/UL (ref 4.7–6.1)
SODIUM SERPL-SCNC: 139 MMOL/L (ref 135–145)
TRIGL SERPL-MCNC: 75 MG/DL (ref 0–149)
WBC # BLD AUTO: 7.7 K/UL (ref 4.8–10.8)

## 2021-06-13 PROCEDURE — 700102 HCHG RX REV CODE 250 W/ 637 OVERRIDE(OP): Performed by: NURSE PRACTITIONER

## 2021-06-13 PROCEDURE — 700105 HCHG RX REV CODE 258: Performed by: INTERNAL MEDICINE

## 2021-06-13 PROCEDURE — 99239 HOSP IP/OBS DSCHRG MGMT >30: CPT | Mod: GC | Performed by: HOSPITALIST

## 2021-06-13 PROCEDURE — 700102 HCHG RX REV CODE 250 W/ 637 OVERRIDE(OP): Performed by: HOSPITALIST

## 2021-06-13 PROCEDURE — 80061 LIPID PANEL: CPT

## 2021-06-13 PROCEDURE — 36415 COLL VENOUS BLD VENIPUNCTURE: CPT

## 2021-06-13 PROCEDURE — RXMED WILLOW AMBULATORY MEDICATION CHARGE: Performed by: NURSE PRACTITIONER

## 2021-06-13 PROCEDURE — A9270 NON-COVERED ITEM OR SERVICE: HCPCS | Performed by: NURSE PRACTITIONER

## 2021-06-13 PROCEDURE — 85027 COMPLETE CBC AUTOMATED: CPT

## 2021-06-13 PROCEDURE — 700102 HCHG RX REV CODE 250 W/ 637 OVERRIDE(OP)

## 2021-06-13 PROCEDURE — 80048 BASIC METABOLIC PNL TOTAL CA: CPT

## 2021-06-13 PROCEDURE — A9270 NON-COVERED ITEM OR SERVICE: HCPCS | Performed by: HOSPITALIST

## 2021-06-13 PROCEDURE — RXMED WILLOW AMBULATORY MEDICATION CHARGE: Performed by: STUDENT IN AN ORGANIZED HEALTH CARE EDUCATION/TRAINING PROGRAM

## 2021-06-13 PROCEDURE — 99233 SBSQ HOSP IP/OBS HIGH 50: CPT | Performed by: INTERNAL MEDICINE

## 2021-06-13 PROCEDURE — A9270 NON-COVERED ITEM OR SERVICE: HCPCS

## 2021-06-13 RX ORDER — LISINOPRIL 5 MG/1
5 TABLET ORAL DAILY
Qty: 30 TABLET | Refills: 1 | Status: SHIPPED | OUTPATIENT
Start: 2021-06-13 | End: 2021-06-18 | Stop reason: SDUPTHER

## 2021-06-13 RX ORDER — AMIODARONE HYDROCHLORIDE 200 MG/1
400 TABLET ORAL TWICE DAILY
Status: DISCONTINUED | OUTPATIENT
Start: 2021-06-13 | End: 2021-06-13 | Stop reason: HOSPADM

## 2021-06-13 RX ORDER — ATORVASTATIN CALCIUM 40 MG/1
40 TABLET, FILM COATED ORAL DAILY
Qty: 90 TABLET | Refills: 1 | Status: SHIPPED | OUTPATIENT
Start: 2021-06-13 | End: 2021-12-02 | Stop reason: SDUPTHER

## 2021-06-13 RX ORDER — SPIRONOLACTONE 25 MG/1
25 TABLET ORAL DAILY
Qty: 30 TABLET | Refills: 1 | Status: SHIPPED | OUTPATIENT
Start: 2021-06-14 | End: 2021-06-18 | Stop reason: SDUPTHER

## 2021-06-13 RX ORDER — ASPIRIN 81 MG/1
81 TABLET ORAL DAILY
Qty: 14 TABLET | Refills: 0 | Status: SHIPPED | OUTPATIENT
Start: 2021-06-14 | End: 2021-06-18

## 2021-06-13 RX ORDER — AMIODARONE HYDROCHLORIDE 200 MG/1
200 TABLET ORAL DAILY
Qty: 30 TABLET | Refills: 11 | Status: SHIPPED | OUTPATIENT
Start: 2021-06-28 | End: 2021-09-02 | Stop reason: SDUPTHER

## 2021-06-13 RX ORDER — METOPROLOL SUCCINATE 50 MG/1
50 TABLET, EXTENDED RELEASE ORAL EVERY EVENING
Qty: 30 TABLET | Refills: 0 | Status: SHIPPED | OUTPATIENT
Start: 2021-06-13 | End: 2021-06-18 | Stop reason: SDUPTHER

## 2021-06-13 RX ORDER — ATORVASTATIN CALCIUM 40 MG/1
TABLET, FILM COATED ORAL
Status: COMPLETED
Start: 2021-06-13 | End: 2021-06-13

## 2021-06-13 RX ORDER — CLOPIDOGREL BISULFATE 75 MG/1
TABLET ORAL
Status: COMPLETED
Start: 2021-06-13 | End: 2021-06-13

## 2021-06-13 RX ORDER — AMIODARONE HYDROCHLORIDE 200 MG/1
200 TABLET ORAL
Status: DISCONTINUED | OUTPATIENT
Start: 2021-06-28 | End: 2021-06-13 | Stop reason: HOSPADM

## 2021-06-13 RX ORDER — CLOPIDOGREL BISULFATE 75 MG/1
75 TABLET ORAL DAILY
Qty: 90 TABLET | Refills: 1 | Status: SHIPPED | OUTPATIENT
Start: 2021-06-14 | End: 2021-09-02

## 2021-06-13 RX ORDER — AMIODARONE HYDROCHLORIDE 400 MG/1
400 TABLET ORAL 2 TIMES DAILY
Qty: 30 TABLET | Refills: 11 | Status: SHIPPED | OUTPATIENT
Start: 2021-06-13 | End: 2021-06-28

## 2021-06-13 RX ORDER — AMIODARONE HYDROCHLORIDE 200 MG/1
400 TABLET ORAL TWICE DAILY
Status: DISCONTINUED | OUTPATIENT
Start: 2021-06-13 | End: 2021-06-13

## 2021-06-13 RX ORDER — AMIODARONE HYDROCHLORIDE 200 MG/1
200 TABLET ORAL DAILY
Status: DISCONTINUED | OUTPATIENT
Start: 2021-06-28 | End: 2021-06-13

## 2021-06-13 RX ADMIN — CLOPIDOGREL BISULFATE 75 MG: 75 TABLET ORAL at 05:09

## 2021-06-13 RX ADMIN — APIXABAN 5 MG: 5 TABLET, FILM COATED ORAL at 05:10

## 2021-06-13 RX ADMIN — ATORVASTATIN CALCIUM 40 MG: 40 TABLET, FILM COATED ORAL at 05:09

## 2021-06-13 RX ADMIN — AMIODARONE HYDROCHLORIDE 400 MG: 200 TABLET ORAL at 10:46

## 2021-06-13 RX ADMIN — SPIRONOLACTONE 25 MG: 25 TABLET ORAL at 05:10

## 2021-06-13 RX ADMIN — SODIUM CHLORIDE: 9 INJECTION, SOLUTION INTRAVENOUS at 01:41

## 2021-06-13 RX ADMIN — ASPIRIN 81 MG: 81 TABLET, COATED ORAL at 05:08

## 2021-06-13 ASSESSMENT — ENCOUNTER SYMPTOMS
STRIDOR: 0
CHOKING: 0
CHEST TIGHTNESS: 0
SHORTNESS OF BREATH: 0
COUGH: 0
WHEEZING: 0
APNEA: 0

## 2021-06-13 NOTE — PROGRESS NOTES
Report received, patient care assumed. Patient A&Ox4, insisting on discharging today. On RA, HR afib low 100s up to 140s with ambulation. A&Ox4, no complaints of pain. Bed in lowest, locked position. Pt ambulating in shoes. Belongings and call light within reach.

## 2021-06-13 NOTE — PROGRESS NOTES
Monitor summary:     --/0.07/--     Afib 96 - 132     with PVCs, couplets    This RN has reviewed the monitor strip in the media tab.

## 2021-06-13 NOTE — PROGRESS NOTES
Bedside report received. POC discussed with pt; all questions answered at this time. Patients right radial site C/D/I.

## 2021-06-13 NOTE — CARE PLAN
The patient is Stable - Low risk of patient condition declining or worsening    Shift Goals  Clinical Goals: Monitor HR, NPO at midnight   Patient Goals: rest    Progress made toward(s) clinical / shift goals:  Patient feeling much better. HR still elevated with ambulation but patient adamant about going home. Patient being discharged, to follow up outpatient.     Patient is not progressing towards the following goals controlling HR.

## 2021-06-13 NOTE — PROGRESS NOTES
Air removed from TR band at 3ml increments every 13 minutes . TR band removed from right wrist. Site is C/D/I. Vital signs are stable and patient Is resting at this time.

## 2021-06-13 NOTE — DISCHARGE SUMMARY
Discharge Summary      Date of Admission: 6/10/2021  Date of Discharge: 6/13/2021      Discharging Attending Physician: Dwayne Aponte M.D.    Discharging Senior Resident: Dr. Knight  Discharging Espinoza Resident: Dr. Zamarripa      ADMISSION DATE  6/10/2021      CHIEF COMPLAINT ON ADMISSION  Chief Complaint   Patient presents with   • Dizziness   • Shortness of Breath   • Atrial Fibrillation       CODE STATUS  Full Code      HPI AND HOSPITAL COURSE  75-year-old male with PMH of atrial fibrillation and recent diagnosis of HFrEF who returns to the hospital for further evaluation and management of atrial fibrillation with RVR. Cardiology was consulted on admission, the patient was given a dose of digoxin, he was continued on metoprolol 25 twice daily, he was made NPO at midnight for PORTILLO and DCCV on 6/11/21, per Cardiology. Holding CHF medications given cardiac cath and repeat DCCV on 6/12/21 per Cardiology. Continuing apixaban per Cardiology. Normal TSH. 2 of 2 blood cultures NGTD. No electrolyte disturbances. Lactic acidosis resolved. CBC unremarkable. BAL and UDS negative.     HFrEF  6/2/21 TTE showed EF 25% with severe AS versus pseudostenosis. proBNP 3931 this admission, compared with 4828 on 6/2/21. PORTILLO done prior to DCCV for a-fib showed moderate MR, severely reduced LV systolic function, and moderate to severe AS. Underwent cardiac cath on 6/12/21, found to have 99% stenosis of his LCx, underwent DONOVAN to LCx, started on DAPT with aspirin and Palvix, home apixaban also resumed, plan is to continue triple therapy for 2-4 weeks per Cardiology then discontinue aspirin and continue Plavix and apixaban. Has close Cardiology follow up scheduled. Started on lisinopril and spironolactone prior to discharge.     Paroxysmal atrial fibrillation  Chronic, sent to the ED from his Cardiologist's office when he was found to be in a-fib with RVR. No history of prior MI. Denies history of substance abuse, denies using alcohol  recently, denies using OTC medications or herbal supplements, denies recent medication changes. TSH normal, UDS and BAL negative. Underwent PORTILLO and DCCV on 6/11/21 with restoration of SR. PORTILLO was significant for MR, severely reduced LV systolic function, no clots, and moderate-severe AS. Converted back to a-fib 6/11/21 evening per tele monitoring and repeat EKG. Home metoprolol tartrate discontinued. Started on metoprolol XL 50 mg once daily. Remained in atrial fibrillation with increased HR on 6/13/21 but unwilling to stay in the hospital another day therefore started on amiodarone by Cardiology and cleared for discharge with close outpatient Cardiology follow up.     Smoking  Patient has smoked 1 cigar per day for 60 years. Smoking is the most modifiable risk factor for his comorbidities so he will benefit from smoking cessation counseling. Patient should be counseled on smoking cessation while inpatient, and if indicated be set up with outpatient smoking cessation support.    LA thrombus  TTE from 6/2/21 shows presence of left atrial mass or thrombus. PORTILLO done on 6/11/21 showed no clots in left atrium or appendage.     Therefore, he is discharged in fair and stable condition to home with close outpatient follow-up. The patient met 2-midnight criteria for an inpatient stay at the time of discharge. Total time spent on discharging patient: 45 minutes.       DISCHARGE DATE   6/13/2021      FOLLOW UP ITEMS POST DISCHARGE  1. DAPT for NSTEMI status post DONOVAN to LCx on 6/12/21  2. Atrial fibrillation - on apixaban, home metoprolol tartrate changed to succinate 50 mg daily, started on amiodarone prior to discharge as well   3. HFrEF - started on low dose lisinopril and spironolactone   4. Smoking cessation counseling       DISCHARGE DIAGNOSES  Principal Problem:    Paroxysmal atrial fibrillation POA: Yes  Active Problems:    HFrEF POA: Unknown    Hyperlipidemia POA: Yes    Hypertension POA: Yes    LA thrombus POA:  Unknown    Smoking POA: Unknown  Resolved Problems:    * No resolved hospital problems. *      FOLLOW UP  Future Appointments   Date Time Provider Department Center   6/18/2021  2:00 PM SORAYA Dillard None   6/24/2021  9:15 AM SORAYA Martines None     The patient was advised to schedule a PCP visit in 5-7 days for hospital follow up.       MEDICATIONS ON DISCHARGE     Medication List      START taking these medications      Instructions   Adult Aspirin Regimen 81 MG EC tablet  Start taking on: June 14, 2021  Generic drug: aspirin   Take 1 tablet by mouth every day for 14 days.  Dose: 81 mg     * amiodarone 400 MG tablet  Commonly known as: PACERONE   Take 1 tablet by mouth 2 times a day for 14 days.  Dose: 400 mg     * amiodarone 200 MG Tabs  Start taking on: June 28, 2021  Commonly known as: Cordarone   Take 1 tablet by mouth every day.  Dose: 200 mg     clopidogrel 75 MG Tabs  Start taking on: June 14, 2021  Commonly known as: PLAVIX   Take 1 tablet by mouth every day.  Dose: 75 mg     lisinopril 5 MG Tabs  Commonly known as: PRINIVIL   Take 1 tablet by mouth every day.  Dose: 5 mg     metoprolol SR 50 MG Tb24  Commonly known as: TOPROL XL   Take 1 tablet by mouth every evening.  Dose: 50 mg     spironolactone 25 MG Tabs  Start taking on: June 14, 2021  Commonly known as: ALDACTONE   Take 1 tablet by mouth every day.  Dose: 25 mg         * This list has 2 medication(s) that are the same as other medications prescribed for you. Read the directions carefully, and ask your doctor or other care provider to review them with you.            CHANGE how you take these medications      Instructions   atorvastatin 40 MG Tabs  What changed:   · medication strength  · how much to take  Commonly known as: Lipitor   Take 1 tablet by mouth every day.  Dose: 40 mg     triamcinolone acetonide 0.1 % Crea  What changed:   · when to take this  · reasons to take this  Commonly known as: KENALOG   Apply 1  Application to affected area(s) 2 times a day.  Dose: 1 Application        CONTINUE taking these medications      Instructions   Eliquis 5mg Tabs  Generic drug: apixaban   Take 5 mg by mouth 2 times a day.  Dose: 5 mg     furosemide 40 MG Tabs  Commonly known as: LASIX   Take 1 tablet by mouth every day for 30 days.  Dose: 40 mg        STOP taking these medications    metoprolol tartrate 100 MG Tabs  Commonly known as: LOPRESSOR     potassium chloride SA 20 MEQ Tbcr  Commonly known as: Kdur        ALLERGIES  Allergies   Allergen Reactions   • Penicillins Unspecified     Last dose  > 60 years ago       DIET  Cardiac diet with 1.5 gm salt and 1.8 L fluid restriction daily       ACTIVITY  As tolerated.  Weight bearing as tolerated      CONSULTATIONS  Dr. Ruiz with Cardiology consulted. Treatment options were discussed and plan of care agreed upon.      INTERVIEW AND PHYSICAL EXAM ON DAY OF DISCHARGE   Sitting in a chair dressed in his home clothes, unwilling to remain in the hospital another day, demonstrated his understanding of our recommendations. Seen by Cardiology, started on amiodarone for atrial fibrillation prior to discharge. Denied SOB or dizziness with ambulation. Denied nausea, vomiting, CP, abnormal bleeding or bruising and bowel or bladder dysfunction.     Physical Exam  Constitutional:       General: He is not in acute distress.     Appearance: He is not toxic-appearing.   HENT:      Head: Normocephalic and atraumatic.      Nose: Nose normal.      Mouth/Throat:      Mouth: Mucous membranes are moist.      Pharynx: Oropharynx is clear.   Eyes:      General: No scleral icterus.     Extraocular Movements: Extraocular movements intact.   Cardiovascular:      Rate and Rhythm: Tachycardia present. Rhythm irregular.      Heart sounds: No murmur heard.     Pulmonary:      Effort: Pulmonary effort is normal. No respiratory distress.      Breath sounds: No wheezing or rales.   Abdominal:      General: There is no  distension.      Palpations: Abdomen is soft.      Tenderness: There is no abdominal tenderness.   Musculoskeletal:         General: No swelling or tenderness. Normal range of motion.   Skin:     General: Skin is warm and dry.   Neurological:      Mental Status: He is alert and oriented to person, place, and time. Mental status is at baseline.      Motor: No weakness.          PROCEDURES  PORTILLO and DVVC 6/11/21  Cardiac cath and PCI to LCx via DONOVAN 6/12/21

## 2021-06-13 NOTE — DISCHARGE INSTRUCTIONS
Jun 18, 2021  2:00 PM   Heart Failure New Patient with SORAYA Dillard   [facility] (--) [address], [address]   [place] [address]   [phone]      Jun 24, 2021  9:15 AM   Heart Failure Established Patient with SORAYA Martines   [facility] (--) [address]   [place] [address]   [phone]     Discharge Instructions    Discharged to home by taxi with self. Discharged via walking, hospital escort: Refused.  Special equipment needed: Not Applicable    Be sure to schedule a follow-up appointment with your primary care doctor or any specialists as instructed.     Discharge Plan:        I understand that a diet low in cholesterol, fat, and sodium is recommended for good health. Unless I have been given specific instructions below for another diet, I accept this instruction as my diet prescription.   Other diet: cardiac    Special Instructions:   HF Patient Discharge Instructions  · Monitor your weight daily, and maintain a weight chart, to track your weight changes.   · Activity as tolerated, unless your Doctor has ordered otherwise. Other activity order: sedentary.  · Follow a low fat, low cholesterol, low salt diet unless instructed otherwise by your Doctor. Read the labels on the back of food products and track your intake of fat, cholesterol and salt.   · Fluid Restriction No. If a Fluid Restriction has been ordered by your Doctor, measure fluids with a measuring cup to ensure that you are not exceeding the restriction.   · No smoking.  · Oxygen No. If your Doctor has ordered that you wear Oxygen at home, it is important to wear it as ordered.  · Did you receive an explanation from staff on the importance of taking each of your medications and why it is necessary to keep taking them unless your doctor says to stop? Yes  · Were all of your questions answered about how to manage your heart failure  and what to do if you have increased signs and symptoms after you go home? Yes  · Do you feel like your heart failure care team involved you in the care treatment plan and allowed you to make decisions regarding your care while in the hospital and addressed any discharge needs you might have? Yes    See the educational handout provided at discharge for more information on monitoring your daily weight, activity and diet. This also explains more about Heart Failure, symptoms of a flare-up and some of the tests that you have undergone.     Warning Signs of a Flare-Up include:  · Swelling in the ankles or lower legs.  · Shortness of breath, while at rest, or while doing normal activities.   · Shortness of breath at night when in bed, or coughing in bed.   · Requiring more pillows to sleep at night, or needing to sit up at night to sleep.  · Feeling weak, dizzy or fatigued.     When to call your Doctor:  · Call [facility] seven days a week from 8:00 a.m. to 8:00 p.m. for medical questions [phone].  · Call your Primary Care Physician or Cardiologist if:   1. You experience any pain radiating to your jaw or neck.  2. You have any difficulty breathing.  3. You experience weight gain of 3 lbs in a day or 5 lbs in a week.   4. You feel any palpitations or irregular heartbeats.  5. You become dizzy or lose consciousness.   If you have had an angiogram or had a pacemaker or AICD placed, and experience:  1. Bleeding, drainage or swelling at the surgical / puncture site.  2. Fever greater than 100.0 F  3. Shock from internal defibrillator.  4. Cool and / or numb extremities.      · Is patient discharged on Warfarin / Coumadin?   No     Depression / Suicide Risk    As you are discharged from this [facility], it is important to learn how to keep safe from harming yourself.    Recognize the warning signs:  · Abrupt changes in personality, positive or negative- including increase in energy   · Giving  away possessions  · Change in eating patterns- significant weight changes-  positive or negative  · Change in sleeping patterns- unable to sleep or sleeping all the time   · Unwillingness or inability to communicate  · Depression  · Unusual sadness, discouragement and loneliness  · Talk of wanting to die  · Neglect of personal appearance   · Rebelliousness- reckless behavior  · Withdrawal from people/activities they love  · Confusion- inability to concentrate     If you or a loved one observes any of these behaviors or has concerns about self-harm, here's what you can do:  · Talk about it- your feelings and reasons for harming yourself  · Remove any means that you might use to hurt yourself (examples: pills, rope, extension cords, firearm)  · Get professional help from the community (Mental Health, Substance Abuse, psychological counseling)  · Do not be alone:Call your Safe Contact- someone whom you trust who will be there for you.  · Call your local CRISIS HOTLINE 323-7770 or 639-444-6182  · Call your local Children's Mobile Crisis Response Team [place] (547) 965-2464 or wwwDragonplay  · Call the toll free National Suicide Prevention Hotlines   · National Suicide Prevention Lifeline 986-425-IAPL (0068)  · National Hope Line Network 800-SUICIDE (606-8354)        Coronary Angiogram With Stent  Coronary angiogram with stent placement is a procedure to widen or open a narrow blood vessel of the heart (coronary artery). Arteries may become blocked by cholesterol buildup (plaques) in the lining of the wall. When a coronary artery becomes partially blocked, blood flow to that area decreases. This may lead to chest pain or a heart attack (myocardial infarction).  A stent is a small piece of metal that looks like mesh or a spring. Stent placement may be done as treatment for a heart attack or right after a coronary angiogram in which a blocked artery is found.  Let your health care provider know about:  · Any  allergies you have.  · All medicines you are taking, including vitamins, herbs, eye drops, creams, and over-the-counter medicines.  · Any problems you or family members have had with anesthetic medicines.  · Any blood disorders you have.  · Any surgeries you have had.  · Any medical conditions you have.  · Whether you are pregnant or may be pregnant.  What are the risks?  Generally, this is a safe procedure. However, problems may occur, including:  · Damage to the heart or its blood vessels.  · A return of blockage.  · Bleeding, infection, or bruising at the insertion site.  · A collection of blood under the skin (hematoma) at the insertion site.  · A blood clot in another part of the body.  · Kidney injury.  · Allergic reaction to the dye or contrast that is used.  · Bleeding into the abdomen (retroperitoneal bleeding).  What happens before the procedure?  Staying hydrated  Follow instructions from your health care provider about hydration, which may include:  · Up to 2 hours before the procedure - you may continue to drink clear liquids, such as water, clear fruit juice, black coffee, and plain tea.    Eating and drinking restrictions  Follow instructions from your health care provider about eating and drinking, which may include:  · 8 hours before the procedure - stop eating heavy meals or foods such as meat, fried foods, or fatty foods.  · 6 hours before the procedure - stop eating light meals or foods, such as toast or cereal.  · 2 hours before the procedure - stop drinking clear liquids.  Ask your health care provider about:  · Changing or stopping your regular medicines. This is especially important if you are taking diabetes medicines or blood thinners.  · Taking medicines such as ibuprofen. These medicines can thin your blood. Do not take these medicines before your procedure if your health care provider instructs you not to. Generally, aspirin is recommended before a procedure of passing a small, thin tube  (catheter) through a blood vessel and into the heart (cardiac catheterization).  What happens during the procedure?    · An IV tube will be inserted into one of your veins.  · You will be given one or more of the following:  ? A medicine to help you relax (sedative).  ? A medicine to numb the area where the catheter will be inserted into an artery (local anesthetic).  · To reduce your risk of infection:  ? Your health care team will wash or sanitize their hands.  ? Your skin will be washed with soap.  ? Hair may be removed from the area where the catheter will be inserted.  · Using a guide wire, the catheter will be inserted into an artery. The location may be in your groin, in your wrist, or in the fold of your arm (near your elbow).  · A type of X-ray (fluoroscopy) will be used to help guide the catheter to the opening of the arteries in the heart.  · A dye will be injected into the catheter, and X-rays will be taken. The dye will help to show where any narrowing or blockages are located in the arteries.  · A tiny wire will be guided to the blocked spot, and a balloon will be inflated to make the artery wider.  · The stent will be expanded and will crush the plaques into the wall of the vessel. The stent will hold the area open and improve the blood flow. Most stents have a drug coating to reduce the risk of the stent narrowing over time.  · The artery may be made wider using a drill, laser, or other tools to remove plaques.  · When the blood flow is better, the catheter will be removed. The lining of the artery will grow over the stent, which stays where it was placed.  This procedure may vary among health care providers and hospitals.  What happens after the procedure?  · If the procedure is done through the leg, you will be kept in bed lying flat for about 6 hours. You will be instructed to not bend and not cross your legs.  · The insertion site will be checked frequently.  · The pulse in your foot or wrist  will be checked frequently.  · You may have additional blood tests, X-rays, and a test that records the electrical activity of your heart (electrocardiogram, or ECG).  This information is not intended to replace advice given to you by your health care provider. Make sure you discuss any questions you have with your health care provider.  Document Released: 06/23/2004 Document Revised: 03/29/2019 Document Reviewed: 07/23/2017  Healthcare Bluebook Patient Education © 2020 [employer]      Amiodarone tablets  What is this medicine?  AMIODARONE (a FALGUNI oh da rc) is an antiarrhythmic drug. It helps make your heart beat regularly. Because of the side effects caused by this medicine, it is only used when other medicines have not worked. It is usually used for heartbeat problems that may be life threatening.  This medicine may be used for other purposes; ask your health care provider or pharmacist if you have questions.  COMMON BRAND NAME(S): Cordarone, Pacerone  What should I tell my health care provider before I take this medicine?  They need to know if you have any of these conditions:  · liver disease  · lung disease  · other heart problems  · thyroid disease  · an unusual or allergic reaction to amiodarone, iodine, other medicines, foods, dyes, or preservatives  · pregnant or trying to get pregnant  · breast-feeding  How should I use this medicine?  Take this medicine by mouth with a glass of water. Follow the directions on the prescription label. You can take this medicine with or without food. However, you should always take it the same way each time. Take your doses at regular intervals. Do not take your medicine more often than directed. Do not stop taking except on the advice of your doctor or health care professional.  A special MedGuide will be given to you by the pharmacist with each prescription and refill. Be sure to read this information carefully each time.  Talk to your pediatrician regarding the use of this  medicine in children. Special care may be needed.  Overdosage: If you think you have taken too much of this medicine contact a poison control center or emergency room at once.  NOTE: This medicine is only for you. Do not share this medicine with others.  What if I miss a dose?  If you miss a dose, take it as soon as you can. If it is almost time for your next dose, take only that dose. Do not take double or extra doses.  What may interact with this medicine?  Do not take this medicine with any of the following medications:  · abarelix  · apomorphine  · arsenic trioxide  · certain antibiotics like erythromycin, gemifloxacin, levofloxacin, pentamidine  · certain medicines for depression like amoxapine, tricyclic antidepressants  · certain medicines for fungal infections like fluconazole, itraconazole, ketoconazole, posaconazole, voriconazole  · certain medicines for irregular heart beat like disopyramide, dronedarone, ibutilide, propafenone, sotalol  · certain medicines for malaria like chloroquine, halofantrine  · cisapride  · droperidol  · haloperidol  · hawthorn  · maprotiline  · methadone  · phenothiazines like chlorpromazine, mesoridazine, thioridazine  · pimozide  · ranolazine  · red yeast rice  · vardenafil  This medicine may also interact with the following medications:  · antiviral medicines for HIV or AIDS  · certain medicines for blood pressure, heart disease, irregular heart beat  · certain medicines for cholesterol like atorvastatin, cerivastatin, lovastatin, simvastatin  · certain medicines for hepatitis C like sofosbuvir and ledipasvir; sofosbuvir  · certain medicines for seizures like phenytoin  · certain medicines for thyroid problems  · certain medicines that treat or prevent blood clots like warfarin  · cholestyramine  · cimetidine  · clopidogrel  · cyclosporine  · dextromethorphan  · diuretics  · dofetilide  · fentanyl  · general anesthetics  · grapefruit  juice  · lidocaine  · loratadine  · methotrexate  · other medicines that prolong the QT interval (cause an abnormal heart rhythm)  · procainamide  · quinidine  · rifabutin, rifampin, or rifapentine  · Dimple's Wort  · trazodone  · ziprasidone  This list may not describe all possible interactions. Give your health care provider a list of all the medicines, herbs, non-prescription drugs, or dietary supplements you use. Also tell them if you smoke, drink alcohol, or use illegal drugs. Some items may interact with your medicine.  What should I watch for while using this medicine?  Your condition will be monitored closely when you first begin therapy. Often, this drug is first started in a hospital or other monitored health care setting. Once you are on maintenance therapy, visit your doctor or health care professional for regular checks on your progress. Because your condition and use of this medicine carry some risk, it is a good idea to carry an identification card, necklace or bracelet with details of your condition, medications, and doctor or health care professional.  You may get drowsy or dizzy. Do not drive, use machinery, or do anything that needs mental alertness until you know how this medicine affects you. Do not stand or sit up quickly, especially if you are an older patient. This reduces the risk of dizzy or fainting spells.  This medicine can make you more sensitive to the sun. Keep out of the sun. If you cannot avoid being in the sun, wear protective clothing and use sunscreen. Do not use sun lamps or tanning beds/booths.  You should have regular eye exams before and during treatment. Call your doctor if you have blurred vision, see halos, or your eyes become sensitive to light. Your eyes may get dry. It may be helpful to use a lubricating eye solution or artificial tears solution.  If you are going to have surgery or a procedure that requires contrast dyes, tell your doctor or health care professional  that you are taking this medicine.  What side effects may I notice from receiving this medicine?  Side effects that you should report to your doctor or health care professional as soon as possible:  · allergic reactions like skin rash, itching or hives, swelling of the face, lips, or tongue  · blue-gray coloring of the skin  · blurred vision, seeing blue green halos, increased sensitivity of the eyes to light  · breathing problems  · chest pain  · dark urine  · fast, irregular heartbeat  · feeling faint or light-headed  · intolerance to heat or cold  · nausea or vomiting  · pain and swelling of the scrotum  · pain, tingling, numbness in feet, hands  · redness, blistering, peeling or loosening of the skin, including inside the mouth  · spitting up blood  · stomach pain  · sweating  · unusual or uncontrolled movements of body  · unusually weak or tired  · weight gain or loss  · yellowing of the eyes or skin  Side effects that usually do not require medical attention (report to your doctor or health care professional if they continue or are bothersome):  · change in sex drive or performance  · constipation  · dizziness  · headache  · loss of appetite  · trouble sleeping  This list may not describe all possible side effects. Call your doctor for medical advice about side effects. You may report side effects to FDA at 3-257-FDA-8771.  Where should I keep my medicine?  Keep out of the reach of children.  Store at room temperature between 20 and 25 degrees C (68 and 77 degrees F). Protect from light. Keep container tightly closed. Throw away any unused medicine after the expiration date.  NOTE: This sheet is a summary. It may not cover all possible information. If you have questions about this medicine, talk to your doctor, pharmacist, or health care provider.  © 2020 [employer]/Gold Standard (2019-11-20 13:44:04)    Clopidogrel tablets  What is this medicine?  CLOPIDOGREL (kloh PID oh grel) helps to prevent blood clots.  This medicine is used to prevent heart attack, stroke, or other vascular events in people who are at high risk.  This medicine may be used for other purposes; ask your health care provider or pharmacist if you have questions.  COMMON BRAND NAME(S): Plavix  What should I tell my health care provider before I take this medicine?  They need to know if you have any of the following conditions:  · bleeding disorders  · bleeding in the brain  · having surgery  · history of stomach bleeding  · an unusual or allergic reaction to clopidogrel, other medicines, foods, dyes, or preservatives  · pregnant or trying to get pregnant  · breast-feeding  How should I use this medicine?  Take this medicine by mouth with a glass of water. Follow the directions on the prescription label. You may take this medicine with or without food. If it upsets your stomach, take it with food. Take your medicine at regular intervals. Do not take it more often than directed. Do not stop taking except on your doctor's advice.  A special MedGuide will be given to you by the pharmacist with each prescription and refill. Be sure to read this information carefully each time.  Talk to your pediatrician regarding the use of this medicine in children. Special care may be needed.  Overdosage: If you think you have taken too much of this medicine contact a poison control center or emergency room at once.  NOTE: This medicine is only for you. Do not share this medicine with others.  What if I miss a dose?  If you miss a dose, take it as soon as you can. If it is almost time for your next dose, take only that dose. Do not take double or extra doses.  What may interact with this medicine?  Do not take this medicine with the following medications:  · dasabuvir; ombitasvir; paritaprevir; ritonavir  · defibrotide  · selexipag  This medicine may also interact with the following medications:  · certain medicines that treat or prevent blood clots like  warfarin  · narcotic medicines for pain  · NSAIDs, medicines for pain and inflammation, like ibuprofen or naproxen  · repaglinide  · SNRIs, medicines for depression, like desvenlafaxine, duloxetine, levomilnacipran, venlafaxine  · SSRIs, medicines for depression, like citalopram, escitalopram, fluoxetine, fluvoxamine, paroxetine, sertraline  · stomach acid blockers like cimetidine, esomeprazole, omeprazole  This list may not describe all possible interactions. Give your health care provider a list of all the medicines, herbs, non-prescription drugs, or dietary supplements you use. Also tell them if you smoke, drink alcohol, or use illegal drugs. Some items may interact with your medicine.  What should I watch for while using this medicine?  Visit your doctor or health care professional for regular check-ups. Do not stop taking your medicine unless your doctor tells you to.  Notify your doctor or health care professional and seek emergency treatment if you develop breathing problems; changes in vision; chest pain; severe, sudden headache; pain, swelling, warmth in the leg; trouble speaking; sudden numbness or weakness of the face, arm or leg. These can be signs that your condition has gotten worse.  If you are going to have surgery or dental work, tell your doctor or health care professional that you are taking this medicine.  Certain genetic factors may reduce the effect of this medicine. Your doctor may use genetic tests to determine treatment.  Only take aspirin if you are instructed to. Low doses of aspirin are used with this medicine to treat some conditions. Taking aspirin with this medicine can increase your risk of bleeding so you must be careful. Talk to your doctor or pharmacist if you have questions.  What side effects may I notice from receiving this medicine?  Side effects that you should report to your doctor or health care professional as soon as possible:  · allergic reactions like skin rash, itching  or hives, swelling of the face, lips, or tongue  · signs and symptoms of bleeding such as bloody or black, tarry stools; red or dark-brown urine; spitting up blood or brown material that looks like coffee grounds; red spots on the skin; unusual bruising or bleeding from the eye, gums, or nose  · signs and symptoms of a blood clot such as breathing problems; changes in vision; chest pain; severe, sudden headache; pain, swelling, warmth in the leg; trouble speaking; sudden numbness or weakness of the face, arm or leg  · signs and symptoms of low blood sugar such as feeling anxious; confusion; dizziness; increased hunger; unusually weak or tired; increased sweating; shakiness; cold, clammy skin; irritable; headache; blurred vision; fast heartbeat; loss of consciousness  Side effects that usually do not require medical attention (report to your doctor or health care professional if they continue or are bothersome):  · constipation  · diarrhea  · headache  · upset stomach  This list may not describe all possible side effects. Call your doctor for medical advice about side effects. You may report side effects to FDA at 2-533-FDA-0230.  Where should I keep my medicine?  Keep out of the reach of children.  Store at room temperature of 59 to 86 degrees F (15 to 30 degrees C). Throw away any unused medicine after the expiration date.  NOTE: This sheet is a summary. It may not cover all possible information. If you have questions about this medicine, talk to your doctor, pharmacist, or health care provider.  © 2020 [employer] Standard (2019-05-20 15:03:38)    Spironolactone tablets  What is this medicine?  SPIRONOLACTONE (shweta on oh LAK tone) is a diuretic. It helps you make more urine and to lose excess water from your body. This medicine is used to treat high blood pressure, and edema or swelling from heart, kidney, or liver disease. It is also used to treat patients who make too much aldosterone or have low  potassium.  This medicine may be used for other purposes; ask your health care provider or pharmacist if you have questions.  COMMON BRAND NAME(S): Aldactone  What should I tell my health care provider before I take this medicine?  They need to know if you have any of these conditions:  · high blood level of potassium  · kidney disease or trouble making urine  · liver disease  · an unusual or allergic reaction to spironolactone, other medicines, foods, dyes, or preservatives  · pregnant or trying to get pregnant  · breast-feeding  How should I use this medicine?  Take this medicine by mouth with a drink of water. Follow the directions on your prescription label. You can take it with or without food. If it upsets your stomach, take it with food. Do not take your medicine more often than directed. Remember that you will need to pass more urine after taking this medicine. Do not take your doses at a time of day that will cause you problems. Do not take at bedtime.  Talk to your pediatrician regarding the use of this medicine in children. While this drug may be prescribed for selected conditions, precautions do apply.  Overdosage: If you think you have taken too much of this medicine contact a poison control center or emergency room at once.  NOTE: This medicine is only for you. Do not share this medicine with others.  What if I miss a dose?  If you miss a dose, take it as soon as you can. If it is almost time for your next dose, take only that dose. Do not take double or extra doses.  What may interact with this medicine?  Do not take this medicine with any of the following medications:  · cidofovir  · eplerenone  · tranylcypromine  This medicine may also interact with the following medications:  · aspirin  · certain medicines for blood pressure or heart disease like benazepril, lisinopril, losartan, valsartan  · certain medicines that treat or prevent blood clots like heparin and  enoxaparin  · cholestyramine  · cyclosporine  · digoxin  · lithium  · medicines that relax muscles for surgery  · NSAIDs, medicines for pain and inflammation, like ibuprofen or naproxen  · other diuretics  · potassium supplements  · steroid medicines like prednisone or cortisone  · trimethoprim  This list may not describe all possible interactions. Give your health care provider a list of all the medicines, herbs, non-prescription drugs, or dietary supplements you use. Also tell them if you smoke, drink alcohol, or use illegal drugs. Some items may interact with your medicine.  What should I watch for while using this medicine?  Visit your doctor or health care professional for regular checks on your progress. Check your blood pressure as directed. Ask your doctor what your blood pressure should be, and when you should contact them.  You may need to be on a special diet while taking this medicine. Ask your doctor. Also, ask how many glasses of fluid you need to drink a day. You must not get dehydrated.  This medicine may make you feel confused, dizzy or lightheaded. Drinking alcohol and taking some medicines can make this worse. Do not drive, use machinery, or do anything that needs mental alertness until you know how this medicine affects you. Do not sit or stand up quickly.  What side effects may I notice from receiving this medicine?  Side effects that you should report to your doctor or health care professional as soon as possible:  · allergic reactions such as skin rash or itching, hives, swelling of the lips, mouth, tongue, or throat  · black or tarry stools  · fast, irregular heartbeat  · fever  · muscle pain, cramps  · numbness, tingling in hands or feet  · trouble breathing  · trouble passing urine  · unusual bleeding  · unusually weak or tired  Side effects that usually do not require medical attention (report to your doctor or health care professional if they continue or are bothersome):  · change in  voice or hair growth  · confusion  · dizzy, drowsy  · dry mouth, increased thirst  · enlarged or tender breasts  · headache  · irregular menstrual periods  · sexual difficulty, unable to have an erection  · stomach upset  This list may not describe all possible side effects. Call your doctor for medical advice about side effects. You may report side effects to FDA at 2-043-FWW-3761.  Where should I keep my medicine?  Keep out of the reach of children.  Store below 25 degrees C (77 degrees F). Throw away any unused medicine after the expiration date.  NOTE: This sheet is a summary. It may not cover all possible information. If you have questions about this medicine, talk to your doctor, pharmacist, or health care provider.  © 2020 [employer] (2018-05-04 09:42:28)

## 2021-06-13 NOTE — PROGRESS NOTES
Patient being discharged. Pt educated on discharge instructions and new prescriptions, verbalized understanding. Patient to  prescriptions at [facility] pharmacy. HF education reviewed with patient, verbalized understanding. Sent with HF packet. Follow up appointment made with cardiology. PIV removed, monitor checked in. Patient going home via taxi with self.

## 2021-06-14 ENCOUNTER — TELEPHONE (OUTPATIENT)
Dept: CARDIOLOGY | Facility: MEDICAL CENTER | Age: 76
End: 2021-06-14

## 2021-06-14 NOTE — TELEPHONE ENCOUNTER
LM for pt to c/b to schedule CV.Patients phone goes to the office of an apt complex, not the actual patient.

## 2021-06-14 NOTE — TELEPHONE ENCOUNTER
----- Message from DACIA De La Torre sent at 6/13/2021 10:19 AM PDT -----  Dr Ruiz requested DCCV this week as outpatient please    Thx

## 2021-06-15 LAB
BACTERIA BLD CULT: NORMAL
BACTERIA BLD CULT: NORMAL
SIGNIFICANT IND 70042: NORMAL
SIGNIFICANT IND 70042: NORMAL
SITE SITE: NORMAL
SITE SITE: NORMAL
SOURCE SOURCE: NORMAL
SOURCE SOURCE: NORMAL

## 2021-06-15 NOTE — PROGRESS NOTES
[facility] is unable to follow up with Rajesh post d/c and introduce CCM services. Number on file belongs to [facility]. Pt will be d/c from [facility] list do to loss of contact.

## 2021-06-18 ENCOUNTER — TELEPHONE (OUTPATIENT)
Dept: CARDIOLOGY | Facility: MEDICAL CENTER | Age: 76
End: 2021-06-18

## 2021-06-18 ENCOUNTER — OFFICE VISIT (OUTPATIENT)
Dept: CARDIOLOGY | Facility: MEDICAL CENTER | Age: 76
End: 2021-06-18
Payer: MEDICARE

## 2021-06-18 VITALS
BODY MASS INDEX: 24.91 KG/M2 | SYSTOLIC BLOOD PRESSURE: 90 MMHG | WEIGHT: 174 LBS | HEART RATE: 73 BPM | HEIGHT: 70 IN | RESPIRATION RATE: 14 BRPM | DIASTOLIC BLOOD PRESSURE: 62 MMHG | OXYGEN SATURATION: 96 %

## 2021-06-18 DIAGNOSIS — I50.20 ACC/AHA STAGE C SYSTOLIC HEART FAILURE (HCC): ICD-10-CM

## 2021-06-18 DIAGNOSIS — I25.10 CORONARY ARTERY DISEASE INVOLVING NATIVE CORONARY ARTERY OF NATIVE HEART WITHOUT ANGINA PECTORIS: ICD-10-CM

## 2021-06-18 DIAGNOSIS — Z79.899 HIGH RISK MEDICATION USE: ICD-10-CM

## 2021-06-18 DIAGNOSIS — Z95.5 S/P DRUG ELUTING CORONARY STENT PLACEMENT: ICD-10-CM

## 2021-06-18 DIAGNOSIS — Z79.01 CHRONIC ANTICOAGULATION: ICD-10-CM

## 2021-06-18 DIAGNOSIS — I50.9 HEART FAILURE, NYHA CLASS 3 (HCC): ICD-10-CM

## 2021-06-18 DIAGNOSIS — I42.8 NON-ISCHEMIC CARDIOMYOPATHY (HCC): ICD-10-CM

## 2021-06-18 DIAGNOSIS — I48.19 PERSISTENT ATRIAL FIBRILLATION (HCC): ICD-10-CM

## 2021-06-18 LAB — EKG IMPRESSION: NORMAL

## 2021-06-18 PROCEDURE — 93000 ELECTROCARDIOGRAM COMPLETE: CPT | Performed by: INTERNAL MEDICINE

## 2021-06-18 PROCEDURE — RXMED WILLOW AMBULATORY MEDICATION CHARGE: Performed by: NURSE PRACTITIONER

## 2021-06-18 PROCEDURE — 99214 OFFICE O/P EST MOD 30 MIN: CPT | Performed by: NURSE PRACTITIONER

## 2021-06-18 RX ORDER — METOPROLOL SUCCINATE 50 MG/1
50 TABLET, EXTENDED RELEASE ORAL EVERY EVENING
Qty: 30 TABLET | Refills: 11 | Status: SHIPPED | OUTPATIENT
Start: 2021-06-18 | End: 2021-07-15

## 2021-06-18 RX ORDER — LISINOPRIL 5 MG/1
5 TABLET ORAL DAILY
Qty: 30 TABLET | Refills: 11 | Status: SHIPPED | OUTPATIENT
Start: 2021-06-18 | End: 2021-07-29

## 2021-06-18 RX ORDER — FUROSEMIDE 20 MG/1
20 TABLET ORAL DAILY
Qty: 30 TABLET | Refills: 11 | Status: SHIPPED | OUTPATIENT
Start: 2021-06-18 | End: 2021-07-02

## 2021-06-18 RX ORDER — SPIRONOLACTONE 25 MG/1
25 TABLET ORAL DAILY
Qty: 30 TABLET | Refills: 11 | Status: SHIPPED | OUTPATIENT
Start: 2021-06-18 | End: 2021-09-02 | Stop reason: SDUPTHER

## 2021-06-18 RX ORDER — DILTIAZEM HYDROCHLORIDE 90 MG/1
180 CAPSULE, EXTENDED RELEASE ORAL 2 TIMES DAILY
COMMUNITY
Start: 2021-05-02 | End: 2021-06-18

## 2021-06-18 ASSESSMENT — MINNESOTA LIVING WITH HEART FAILURE QUESTIONNAIRE (MLHF)
MAKING YOU SHORT OF BREATH: 4
HAVING TO SIT OR LIE DOWN DURING THE DAY: 4
EATING LESS FOODS YOU LIKE: 3
TOTAL_SCORE: 62
GIVING YOU SIDE EFFECTS FROM TREATMENTS: 3
FEELING LIKE A BURDEN TO FAMILY AND FRIENDS: 2
TIRED, FATIGUED OR LOW ON ENERGY: 4
DIFFICULTY GOING AWAY FROM HOME: 4
DIFFICULTY WITH SEXUAL ACTIVITIES: 2
MAKING YOU WORRY: 3
DIFFICULTY SLEEPING WELL AT NIGHT: 3
DIFFICULTY WITH RECREATIONAL PASTIMES, SPORTS, HOBBIES: 2
COSTING YOU MONEY FOR MEDICAL CARE: 2
DIFFICULTY WORKING TO EARN A LIVING: 0
DIFFICULTY TO CONCENTRATE OR REMEMBERING THINGS: 4
LOSS OF SELF CONTROL IN YOUR LIFE: 2
WORKING AROUND THE HOUSE OR YARD DIFFICULT: 4
MAKING YOU FEEL DEPRESSED: 4
SWELLING IN ANKLES OR LEGS: 3
MAKING YOU STAY IN A HOSPITAL: 2
DIFFICULTY SOCIALIZING WITH FAMILY OR FRIENDS: 3
WALKING ABOUT OR CLIMBING STAIRS DIFFICULT: 4

## 2021-06-18 ASSESSMENT — ENCOUNTER SYMPTOMS
PND: 1
DIZZINESS: 0
SHORTNESS OF BREATH: 1
BLOATING: 0
ANOREXIA: 0
LOSS OF BALANCE: 0
ORTHOPNEA: 0
CHANGE IN BOWEL HABIT: 0
WEIGHT LOSS: 0
HEMATEMESIS: 0
SYNCOPE: 0
PALPITATIONS: 1
FEVER: 0
DIARRHEA: 0
BRUISES/BLEEDS EASILY: 1
WEIGHT GAIN: 0
NIGHT SWEATS: 0
CLAUDICATION: 0
NEAR-SYNCOPE: 0
ABDOMINAL PAIN: 0
POOR WOUND HEALING: 0
COUGH: 0
MYALGIAS: 0
HEMATOCHEZIA: 0
LIGHT-HEADEDNESS: 0
HEMOPTYSIS: 0
PALPITATIONS: 0

## 2021-06-18 ASSESSMENT — FIBROSIS 4 INDEX: FIB4 SCORE: 2.29

## 2021-06-18 NOTE — PROGRESS NOTES
Chief Complaint   Patient presents with   • Supraventricular Tachycardia (SVT)       Subjective:   Rajesh De Luna is a 75 y.o. male who presents today for follow-up after his recent hospitalization, atrial fibrillation heart failure.    Patient recently seen in our clinic on 6/10/2021 after a prior hospitalization.  During that visit, patient was sent back to the emergency room/hospital for further evaluation.  Cardiology was consulted, adjusted his medications and sent for cardioversion on 6/11/2021.  Patient was also sent for coronary angiogram and was found to have CAD and received PCI/DONOVAN to the left circumflex. ptdid go back into afib but pt declined repeat DCCV. He was started on amiodarone prior to discharge.  Patient was recommended to schedule his cardioversion as outpatient.    Patient comes in the office today reporting some episodes of shortness of breath at rest, with exertion, occasional palpitations, some lower extremity that has now resolved, PND, slight nosebleed and fatigue.  He denies chest pain, orthopnea or dizziness/lightheadedness.    He reports his home weights are ranging between 124-176 pounds.    Patient does report monitoring his sodium intake, but does eat out due to convenience.    Patient does try to stay hydrated.    Patient denies any alcohol use.    Reports compliance with his medication.  He has not missed any doses of his Eliquis.    Additonally, patient has the following medical problems:    -Rate related cardiomyopathy    -Atrial fibrillation    -Hypertension    -Alcohol use    -Respiratory failure    -lip cancer, s/p radiation    -Occ Cigar    -Dilated aortic root at 3.6 cm on 6/1/2021    Past Medical History:   Diagnosis Date   • A-fib (HCC)    • Cancer (HCC) 2019    Lip cancer   • Hypertension      History reviewed. No pertinent surgical history.  History reviewed. No pertinent family history.  Social History     Socioeconomic History   • Marital status: Single     Spouse  name: Not on file   • Number of children: Not on file   • Years of education: Not on file   • Highest education level: Not on file   Occupational History   • Not on file   Tobacco Use   • Smoking status: Current Some Day Smoker   • Smokeless tobacco: Never Used   • Tobacco comment: Cigars occasionally   Vaping Use   • Vaping Use: Never used   Substance and Sexual Activity   • Alcohol use: Yes     Alcohol/week: 1.8 oz     Types: 3 Cans of beer per week   • Drug use: Never   • Sexual activity: Not on file   Other Topics Concern   • Not on file   Social History Narrative   • Not on file     Social Determinants of Health     Financial Resource Strain:    • Difficulty of Paying Living Expenses:    Food Insecurity:    • Worried About Running Out of Food in the Last Year:    • Ran Out of Food in the Last Year:    Transportation Needs:    • Lack of Transportation (Medical):    • Lack of Transportation (Non-Medical):    Physical Activity:    • Days of Exercise per Week:    • Minutes of Exercise per Session:    Stress:    • Feeling of Stress :    Social Connections:    • Frequency of Communication with Friends and Family:    • Frequency of Social Gatherings with Friends and Family:    • Attends [employer] Services:    • Active Member of Clubs or Organizations:    • Attends Club or Organization Meetings:    • Marital Status:    Intimate Partner Violence:    • Fear of Current or Ex-Partner:    • Emotionally Abused:    • Physically Abused:    • Sexually Abused:      Allergies   Allergen Reactions   • Penicillins Unspecified     Last dose  > 60 years ago       Outpatient Encounter Medications as of 6/18/2021   Medication Sig Dispense Refill   • furosemide (LASIX) 20 MG Tab Take 1 tablet by mouth every day for 30 days. 30 tablet 11   • lisinopril (PRINIVIL) 5 MG Tab Take 1 tablet by mouth every day. 30 tablet 11   • metoprolol SR (TOPROL XL) 50 MG TABLET SR 24 HR Take 1 tablet by mouth every evening. 30 tablet 11   • spironolactone  (ALDACTONE) 25 MG Tab Take 1 tablet by mouth every day. 30 tablet 11   • apixaban (ELIQUIS) 5mg Tab Take 1 tablet by mouth 2 times a day. 60 tablet 11   • atorvastatin (LIPITOR) 40 MG Tab Take 1 tablet by mouth every day. 90 tablet 1   • clopidogrel (PLAVIX) 75 MG Tab Take 1 tablet by mouth every day. 90 tablet 1   • amiodarone (PACERONE) 400 MG tablet Take 1 tablet by mouth 2 times a day for 14 days. 30 tablet 11   • [START ON 6/28/2021] amiodarone (CORDARONE) 200 MG Tab Take 1 tablet by mouth every day. 30 tablet 11   • triamcinolone acetonide (KENALOG) 0.1 % Cream Apply 1 Application to affected area(s) 2 times a day. (Patient taking differently: Apply 1 Application topically 2 times a day as needed.) 30 g 0   • [DISCONTINUED] diltiazem (CARDIZEM SR) 90 MG SR capsule Take 180 mg by mouth 2 times a day. (Patient not taking: Reported on 6/18/2021)     • [DISCONTINUED] aspirin 81 MG EC tablet Take 1 tablet by mouth every day for 14 days. 14 tablet 0   • [DISCONTINUED] lisinopril (PRINIVIL) 5 MG Tab Take 1 tablet by mouth every day. 30 tablet 1   • [DISCONTINUED] metoprolol SR (TOPROL XL) 50 MG TABLET SR 24 HR Take 1 tablet by mouth every evening. 30 tablet 0   • [DISCONTINUED] spironolactone (ALDACTONE) 25 MG Tab Take 1 tablet by mouth every day. 30 tablet 1   • [DISCONTINUED] furosemide (LASIX) 40 MG Tab Take 1 tablet by mouth every day for 30 days. 30 tablet 0   • [DISCONTINUED] apixaban (ELIQUIS) 5mg Tab Take 5 mg by mouth 2 times a day.       No facility-administered encounter medications on file as of 6/18/2021.     Review of Systems   Constitutional: Positive for malaise/fatigue. Negative for fever.   HENT: Positive for nosebleeds.    Respiratory: Positive for shortness of breath. Negative for cough.    Cardiovascular: Positive for palpitations (occ), leg swelling and PND. Negative for chest pain, orthopnea and claudication.   Gastrointestinal: Negative for abdominal pain.   Musculoskeletal: Negative for  "myalgias.   Neurological: Negative for dizziness.   All other systems reviewed and are negative.       Objective:   BP (!) 90/62 (BP Location: Right arm, Patient Position: Sitting, BP Cuff Size: Adult)   Pulse 73   Resp 14   Ht 1.778 m (5' 10\")   Wt 78.9 kg (174 lb)   SpO2 96%   BMI 24.97 kg/m²     Physical Exam   Constitutional: He is oriented to person, place, and time. He appears well-developed.   HENT:   Head: Normocephalic and atraumatic.   Eyes: Pupils are equal, round, and reactive to light.   Neck: No JVD present.   Cardiovascular: Normal rate, regular rhythm and normal heart sounds.   Pulmonary/Chest: Effort normal and breath sounds normal. No respiratory distress. He has no wheezes. He has no rales.   Abdominal: Soft. Bowel sounds are normal.   Musculoskeletal:      Cervical back: Normal range of motion and neck supple.   Neurological: He is alert and oriented to person, place, and time.   Skin: Skin is warm and dry.   Psychiatric: His behavior is normal.   Vitals reviewed.        Lab Results   Component Value Date/Time    CHOLSTRLTOT 108 06/13/2021 02:35 AM    LDL 53 06/13/2021 02:35 AM    HDL 40 06/13/2021 02:35 AM    TRIGLYCERIDE 75 06/13/2021 02:35 AM       Lab Results   Component Value Date/Time    SODIUM 139 06/13/2021 02:35 AM    POTASSIUM 4.1 06/13/2021 02:35 AM    CHLORIDE 107 06/13/2021 02:35 AM    CO2 19 (L) 06/13/2021 02:35 AM    GLUCOSE 87 06/13/2021 02:35 AM    BUN 20 06/13/2021 02:35 AM    CREATININE 1.05 06/13/2021 02:35 AM     Lab Results   Component Value Date/Time    ALKPHOSPHAT 132 (H) 06/11/2021 01:19 AM    ASTSGOT 21 06/11/2021 01:19 AM    ALTSGPT 19 06/11/2021 01:19 AM    TBILIRUBIN 1.0 06/11/2021 01:19 AM        Transthoracic Echo Report 6/2/2021  Severely reduced left ventricular systolic function.  Global hypokinesis.  Reduced right ventricular systolic function.  Severely dilated left atrium - prominence of the posterior LA wall-   possible thrombus/mass lesion- consider " further evaluation with PORTILLO or CT/MRI  Mild mitral regurgitation.     Transesophageal Echo Report 6/11/2021  1. Normal left atrial appendage. No thrombus detected in the left   atrial appendage.      2. The aortic valve is heavily calcified.  There is partial fusion of   all three leaflets.  There is severe aortic stenosis versus   pseudostenois.      3. Severely reduced left ventricular systolic function.  Left   ventricular ejection fraction is visually estimated to be 15%.     Coronary angiogram 6/12/2021  PREOPERATIVE DIAGNOSIS:  1.  Acute decompensated heart failure with reduced ejection fraction  2.  History of alcohol abuse  3.  Non-STEMI  4.  A. fib RVR     POSTOPERATIVE DIAGNOSIS:  1.  99% culprit LCx stenosis  2.  Calcific 50 to 60% proximal LAD stenosis and moderate nonobstructive RCA disease  3.  LVEDP 16 mmHg  4.  Successful PCI culprit LCx with Cecilio DONOVAN     PROCEDURE PERFORMED:  Selective coronary angiography of the native vessels  Left heart catheterization  Left ventriculogram  PCI of LCx DONOVAN  Supervision moderate sedation      Assessment:     1. Persistent atrial fibrillation (HCC)  EKG    Basic Metabolic Panel    CL-CARDIOVERSION   2. High risk medication use  Basic Metabolic Panel   3. ACC/AHA stage C systolic heart failure (HCC)  Basic Metabolic Panel    CL-CARDIOVERSION   4. Heart failure, NYHA class 3 (HCC)     5. Non-ischemic cardiomyopathy (HCC)     6. S/P drug eluting coronary stent placement     7. Chronic anticoagulation     8. Coronary artery disease involving native coronary artery of native heart without angina pectoris         Medical Decision Making:  Today's Assessment / Status / Plan:   1. HFrEF, Stage C, Class 3, LVEF 25%: Based on physical examination findings, patient is euvolemic. No JVD, lungs are clear to auscultation, no pitting edema in bilateral lower extremities, no ascites.  -Heart failure due to rate related cardiomyopathy vs ischemic cardiomyopathy  -ACE-I/ARB/ARNI:  Continue lisinopril 5 mg daily, can consider Entresto in the future  -Evidence Based Beta-blocker: Continue metoprolol SR 50 mg daily  -Aldosterone Antagonist: Continue spironolactone 25 mg daily  -Diuretic: Decrease furosemide to 20 mg daily  -Labs: BMP in 2 weeks  -Repeat Echo in 3 months, if LVEF not >35%, then will discuss/consider ICD for primary Prevention  -Reinforced s/sx of worsening heart failure with patient and weight monitoring. Pt verbalizes understanding. Pt to call office or RTC if present.    -PUMP line number [phone] (PUMP)  -Heart Failure Education: pt to be contacted by HF nurse for further education  -Advanced care planning: Advanced directive and POLST    2. Afib: EKG shows A. fib ventricular rate 90-1 28  -Continue Eliquis 5 mg twice a day  -Continue amiodarone 400 mg twice a day until the 28th then patient will reduce to 200 mg daily  -Continue metoprolol SR 50 mg daily  -Patient to be scheduled for cardioversion    3. CAD, s/p PCI/DONOVAN to LCx on 6/12/2021:  -Recent LDL 53 on 6/13/2021  -Okay to stop aspirin  -Continue clopidogrel 75 mg daily (with Eliquis)  -Continue atorvastatin 40 mg daily    4.  Alcohol use:  -Encouraged continued cessation    FU in clinic in 2 weeks with HF clinic. Sooner if needed.    Patient verbalizes understanding and agrees with the plan of care.     PLEASE NOTE: This Note was created using voice recognition Software. I have made every reasonable attempt to correct obvious errors, but I expect that there are errors of grammar and possibly content that I did not discover before finalizing the note

## 2021-06-18 NOTE — TELEPHONE ENCOUNTER
Tried to call patient to schedule. The number listed is to the apartment complex he lives in. They are unable to take a message for this patient. They did give me a number of [phone], however this number is to Twyla Hernandez at [facility]. I do not see that this patient has released us to talk to [place]. I have been unable to contact this patient. He has no emergency contact listed.     At this time, I am unable to contact this patient to schedule this procedure.    ROXANNE to Linda

## 2021-06-18 NOTE — Clinical Note
Please schedule a Cardioversion for this patient. He lives at a senior home and he states to leave a message and he can call back

## 2021-06-18 NOTE — NON-PROVIDER
Cardiology Clinic Follow-up Note    Date of note:    6/18/2021  Primary Care Provider: Pcp Pt States None    Name:             Rajesh De Luna  YOB: 1945  MRN:               [id number]    CC: Afib, Heart failure    Patient HPI:   Rajesh De Luna is a 75 y.o. male with PMH including A. Fib and  Congestive heart failure. He was last seen in office on the 10 Neli 2021 by OTILIA Powers and was found to have A. Fib with RVR on EKG; he was subsequently sent to the ER then admitted on 06/10/2021.    Patient underwent a LHC on 6/12 and got stent to Lcx  discharged on eliquis, asa and plavix. He was given 14 day supply for aspirin.  He was also cardioverted during admission, but unfortunately did not maintain him in SR.  He was instructed to continue eliquis which he was taking at home.  He was discharged on amiodarone on the day of discharge when he was noted to be in afib with rates to 120s while ambulating, but did not want to stay another day to monitor.  Attempt has been made  since discharge to schedule an outpatient DCCV. Pt reports today not having a mobile line and phone number in record is office at his [facility] apartment. He would like clinic to leave a message if unable to reach.     Reports weight 174-176 lbs, and minimal water intake. Also has most meals from restaurants as it is more convenient. He report his diet consisting of broil or baked chicken and salads.  He also reports alcohol cessation since Feb. He uses 1 cigar daily.    Today he reports a 50% improvement post hospitalization although experience SOB at rest once every other day, dyspnea on minimal exertion, and PND. He also reported mild swelling for a few days post discharge which has resolved since. He also had 1 episode of <1 min unilateral (right) nose bleed since discharge.   He denies any chest pain or pressure, any dizziness, syncope, or near syncope.   QOL score of 62 today    He was previously  hospitalized here at [facility] on 06/01/2021 for A.Fib, and reported at previous appointment that he had been hospitalized at [facility] for A.fib in the past.        Review of Systems   Constitutional: Positive for malaise/fatigue. Negative for fever, night sweats, weight gain and weight loss.   HENT: Positive for nosebleeds (1 episode of nosebleed 2 days ago for less than 1 min, spontaneously resolved- right nostril).    Cardiovascular: Positive for paroxysmal nocturnal dyspnea. Negative for chest pain, cyanosis, leg swelling, near-syncope, orthopnea, palpitations and syncope.   Respiratory: Positive for shortness of breath (on minimal exertion, and occasionally at rest as well ). Negative for cough and hemoptysis.    Hematologic/Lymphatic: Bruises/bleeds easily (since discharge from hospital, mainly on upper extremities. 1 episode of epistaxis).   Skin: Negative for itching, poor wound healing and rash.   Gastrointestinal: Negative for bloating, abdominal pain, anorexia, change in bowel habit, diarrhea, hematemesis, hematochezia and melena.   Genitourinary: Negative for dysuria and hematuria.   Neurological: Negative for dizziness, light-headedness and loss of balance.        Past medical history, social history and family history reviewed.  No changes other than what is outlined in HPI.    Current Outpatient Medications   Medication Sig Dispense Refill   • furosemide (LASIX) 20 MG Tab Take 1 tablet by mouth every day for 30 days. 30 tablet 11   • lisinopril (PRINIVIL) 5 MG Tab Take 1 tablet by mouth every day. 30 tablet 11   • metoprolol SR (TOPROL XL) 50 MG TABLET SR 24 HR Take 1 tablet by mouth every evening. 30 tablet 11   • spironolactone (ALDACTONE) 25 MG Tab Take 1 tablet by mouth every day. 30 tablet 11   • apixaban (ELIQUIS) 5mg Tab Take 1 tablet by mouth 2 times a day. 60 tablet 11   • atorvastatin (LIPITOR) 40 MG Tab Take 1 tablet by mouth every day. 90 tablet 1   • clopidogrel (PLAVIX) 75 MG Tab Take 1  "tablet by mouth every day. 90 tablet 1   • amiodarone (PACERONE) 400 MG tablet Take 1 tablet by mouth 2 times a day for 14 days. 30 tablet 11   • [START ON 6/28/2021] amiodarone (CORDARONE) 200 MG Tab Take 1 tablet by mouth every day. 30 tablet 11   • triamcinolone acetonide (KENALOG) 0.1 % Cream Apply 1 Application to affected area(s) 2 times a day. (Patient taking differently: Apply 1 Application topically 2 times a day as needed.) 30 g 0     No current facility-administered medications for this visit.       Allergies   Allergen Reactions   • Penicillins Unspecified     Last dose  > 60 years ago           Physical Exam:  Ambulatory Vitals  BP (!) 90/62 (BP Location: Right arm, Patient Position: Sitting, BP Cuff Size: Adult)   Pulse 73   Resp 14   Ht 1.778 m (5' 10\")   Wt 78.9 kg (174 lb)   SpO2 96%    BP Readings from Last 4 Encounters:   06/18/21 (!) 90/62   06/13/21 (!) 97/60   06/10/21 100/68   06/05/21 103/74       Weight/BMI: Body mass index is 24.97 kg/m².  Wt Readings from Last 4 Encounters:   06/18/21 78.9 kg (174 lb)   06/12/21 77.3 kg (170 lb 6.7 oz)   06/10/21 79.4 kg (175 lb)   06/04/21 81.9 kg (180 lb 8.9 oz)       General: no apparent distress  Lungs: normal effort, with no crackles, no wheezing or rhonchi.  Heart: Tachycardic, irregular rhythm, no murmur, no rub  Neck: no JVD  Ext:  no lower extremity edema.  Abdomen: soft, non tender, non distended  Neurological: No focal deficits, no facial asymmetry.  Normal speech.  Psychiatric: Appropriate affect, alert and oriented x 3.   Skin: Warm extremities, no rash.      Lab Data Review:  Lab Results   Component Value Date/Time    CHOLSTRLTOT 108 06/13/2021 02:35 AM    LDL 53 06/13/2021 02:35 AM    HDL 40 06/13/2021 02:35 AM    TRIGLYCERIDE 75 06/13/2021 02:35 AM       Lab Results   Component Value Date/Time    SODIUM 139 06/13/2021 02:35 AM    POTASSIUM 4.1 06/13/2021 02:35 AM    CHLORIDE 107 06/13/2021 02:35 AM    CO2 19 (L) 06/13/2021 02:35 AM    " GLUCOSE 87 06/13/2021 02:35 AM    BUN 20 06/13/2021 02:35 AM    CREATININE 1.05 06/13/2021 02:35 AM     Lab Results   Component Value Date/Time    ALKPHOSPHAT 132 (H) 06/11/2021 01:19 AM    ASTSGOT 21 06/11/2021 01:19 AM    ALTSGPT 19 06/11/2021 01:19 AM    TBILIRUBIN 1.0 06/11/2021 01:19 AM      Lab Results   Component Value Date/Time    WBC 7.7 06/13/2021 02:35 AM         Cardiac Imaging and Procedures Review:      TransEsophageal Echo: 06/11/2021  CONCLUSIONS  1. Normal left atrial appendage. No thrombus detected in the left   atrial appendage.   2. The aortic valve is heavily calcified.  There is partial fusion of   all three leaflets.  There is severe aortic stenosis versus   pseudostenois.   3. Severely reduced left ventricular systolic function.  Left   ventricular ejection fraction is visually estimated to be 15%.    Echo: 06/02/02021  CONCLUSIONS  Severely reduced left ventricular systolic function.  Global hypokinesis.  Reduced right ventricular systolic function.  Severely dilated left atrium - prominence of the posterior LA wall-   possible thrombus/mass lesion- consider further evaluation with PORTILLO or   CT/MRI  Mild mitral regurgitation.  LV EF:  25    %       EKG   06/18/2021  ATRIAL FIBRILLATION, V-RATE     BORDERLINE T WAVE ABNORMALITIES   BORDERLINE PROLONGED QT INTERVAL   Compared to ECG 06/12/2021 17:04:40   No significant changes     06/12/2021  ATRIAL FIBRILLATION, V-RATE     BORDERLINE LOW VOLTAGE IN FRONTAL LEADS   BORDERLINE T WAVE ABNORMALITIES   BORDERLINE PROLONGED QT INTERVAL   Compared to ECG 06/11/2021 15:09:40   No significant changes     06/11/2021  ATRIAL FIBRILLATION, V-RATE     Nonspecific inferior T wave abnormalities   Prolonged QTc interval     [facility]  06/12/2021  Result not yet available    Cardioversion:  06/11/2021  Result not yet available      Assessment and Clinical Decision Making:    Atrial fibrillation with rapid ventricular rate  Recently discharged on  6/11/2021 for A. Fib, with v-rate 120s  EKG today shows Afib with v-rate . Still experiencing SOB and IYNG. Cardioversion should be attempted next as suggest by Dr. Ruiz. I would like to schedule pt for the procedure today, but  unfortunately not available. Nurse will contact him later to schedule.  -[id number] Score 4  -Continue Eliquis 5 mg twice a day   - Continue Plavix 75 mg daily  -Continue Lipitor 40 mg  -Discontinue ASA  -Continue Amiodarone 400 mg twice a day, and switch to Amiodarone 200 mg once a day on June 28th 2021  - Counseled on monitoring signs of internal and excessive bleeding and report to ER if experiencing       HFrEF, Stage C, Class iii, LVEF 25%  -Heart failure due to etoh vs rate related vs ischemic   - bp: 90/62 today  -Continue Lisinopril 5 mg daily  -Continue Metoprolol 50 mg daily  -Decrease Furosemide  to 20 mg daily.  Review weight monitoring, diet and water intake today. Instructed to increase Furosemide to 40 mg on days weight increases more than 3lbs a day or 5 lbs in a week, or with swelling of legs, or increased SOB. Should also inform the clinic.  Should increase water intake to 64oz a day, and closely monitor food purchased when eating out. Pt encouraged to rather cook food at home, but to request food labels when ordering at a restaurant.   Sodium goal is <2000mg a day  -Will repeat BMP in 2 weeks  -continue alcohol cessation    Low BP  Pt BP was low today with systolic 88-90. Denies dizziness, lightheadedness. Pt currently does not monitor at home, so unable to determine his baseline post discharge. Most likely combination of hypertensive medications, diuretics, and low intake of fluids.   - Will decrease Furosemide 40 mg to 20 mg daily.  -Pt should increase water intake  -BMP in 2 weeks.  -Will reassess in few weeks  - Should contact clinic or reports if having symptoms of hypotension       FU in clinic in 2 weeks. or sooner if needed.    Libra Bernstein,  PA-S    [facility] PA

## 2021-06-18 NOTE — TELEPHONE ENCOUNTER
----- Message from Inez Almendarez, Med Ass't sent at 6/18/2021  2:59 PM PDT -----  Regarding: Cardioversion  Clint Scott order CL-Cardioversion. Could we please called patient and scheduled. Okay to leave message if patient does not answer.    Thank you

## 2021-06-18 NOTE — PATIENT INSTRUCTIONS
-Decrease Furosemide to 20 mg daily   -Stop Aspirin   -Continue to monitor home weights at home daily. Call office if weight increasing greater than 3 lbs in 1 day or greater than 5 lbs in 1 week.

## 2021-06-21 NOTE — TELEPHONE ENCOUNTER
Attempted to call numbers provided. Voicemail listed for non approved persons. Would write a letter but do not have an apartment number listed.

## 2021-06-24 ENCOUNTER — HOSPITAL ENCOUNTER (OUTPATIENT)
Dept: LAB | Facility: MEDICAL CENTER | Age: 76
End: 2021-06-24
Attending: NURSE PRACTITIONER
Payer: MEDICARE

## 2021-06-24 ENCOUNTER — PHARMACY VISIT (OUTPATIENT)
Dept: PHARMACY | Facility: MEDICAL CENTER | Age: 76
End: 2021-06-24
Payer: MEDICARE

## 2021-06-24 DIAGNOSIS — I48.19 PERSISTENT ATRIAL FIBRILLATION (HCC): ICD-10-CM

## 2021-06-24 DIAGNOSIS — I50.20 ACC/AHA STAGE C SYSTOLIC HEART FAILURE (HCC): ICD-10-CM

## 2021-06-24 DIAGNOSIS — Z79.899 HIGH RISK MEDICATION USE: ICD-10-CM

## 2021-06-24 LAB
ANION GAP SERPL CALC-SCNC: 14 MMOL/L (ref 7–16)
BUN SERPL-MCNC: 21 MG/DL (ref 8–22)
CALCIUM SERPL-MCNC: 9.8 MG/DL (ref 8.5–10.5)
CHLORIDE SERPL-SCNC: 100 MMOL/L (ref 96–112)
CO2 SERPL-SCNC: 28 MMOL/L (ref 20–33)
CREAT SERPL-MCNC: 1.44 MG/DL (ref 0.5–1.4)
GLUCOSE SERPL-MCNC: 80 MG/DL (ref 65–99)
POTASSIUM SERPL-SCNC: 3.9 MMOL/L (ref 3.6–5.5)
SODIUM SERPL-SCNC: 142 MMOL/L (ref 135–145)

## 2021-06-24 PROCEDURE — 36415 COLL VENOUS BLD VENIPUNCTURE: CPT

## 2021-06-24 PROCEDURE — 80048 BASIC METABOLIC PNL TOTAL CA: CPT

## 2021-06-30 ENCOUNTER — TELEPHONE (OUTPATIENT)
Dept: CARDIOLOGY | Facility: MEDICAL CENTER | Age: 76
End: 2021-06-30

## 2021-06-30 NOTE — TELEPHONE ENCOUNTER
Chart Prep        Attempted to call pt to ask about the cardioversion that was ordered by Linda on their last visit but the phone number was an apartments # and no one picked up and no voicemail either.

## 2021-07-02 ENCOUNTER — OFFICE VISIT (OUTPATIENT)
Dept: CARDIOLOGY | Facility: MEDICAL CENTER | Age: 76
End: 2021-07-02
Payer: MEDICARE

## 2021-07-02 VITALS
HEIGHT: 70 IN | SYSTOLIC BLOOD PRESSURE: 90 MMHG | DIASTOLIC BLOOD PRESSURE: 50 MMHG | HEART RATE: 118 BPM | BODY MASS INDEX: 23.91 KG/M2 | WEIGHT: 167 LBS | OXYGEN SATURATION: 96 %

## 2021-07-02 DIAGNOSIS — Z95.5 S/P DRUG ELUTING CORONARY STENT PLACEMENT: ICD-10-CM

## 2021-07-02 DIAGNOSIS — I50.20 ACC/AHA STAGE C SYSTOLIC HEART FAILURE (HCC): ICD-10-CM

## 2021-07-02 DIAGNOSIS — I48.19 PERSISTENT ATRIAL FIBRILLATION (HCC): ICD-10-CM

## 2021-07-02 DIAGNOSIS — Z79.899 HIGH RISK MEDICATION USE: ICD-10-CM

## 2021-07-02 DIAGNOSIS — E78.5 HYPERLIPIDEMIA, UNSPECIFIED HYPERLIPIDEMIA TYPE: ICD-10-CM

## 2021-07-02 DIAGNOSIS — I35.0 AORTIC VALVE STENOSIS, ETIOLOGY OF CARDIAC VALVE DISEASE UNSPECIFIED: ICD-10-CM

## 2021-07-02 DIAGNOSIS — I50.9 HEART FAILURE, NYHA CLASS 3 (HCC): ICD-10-CM

## 2021-07-02 PROCEDURE — 99214 OFFICE O/P EST MOD 30 MIN: CPT | Performed by: NURSE PRACTITIONER

## 2021-07-02 RX ORDER — FUROSEMIDE 20 MG/1
20 TABLET ORAL
Qty: 30 TABLET | Refills: 11 | Status: SHIPPED | OUTPATIENT
Start: 2021-07-02 | End: 2021-07-29

## 2021-07-02 ASSESSMENT — ENCOUNTER SYMPTOMS
BLURRED VISION: 0
LOSS OF CONSCIOUSNESS: 0
BRUISES/BLEEDS EASILY: 0
BLOOD IN STOOL: 0
PALPITATIONS: 0
NAUSEA: 0
FALLS: 0
COUGH: 0
ABDOMINAL PAIN: 0
VOMITING: 0
TINGLING: 0
DIZZINESS: 0
SHORTNESS OF BREATH: 0
PND: 0
CLAUDICATION: 0
MYALGIAS: 0
WEIGHT LOSS: 0
ORTHOPNEA: 0
FEVER: 0

## 2021-07-02 ASSESSMENT — FIBROSIS 4 INDEX: FIB4 SCORE: 2.32

## 2021-07-02 NOTE — PATIENT INSTRUCTIONS
Decrease Furosemide 20 mg daily as needed for weight gain greater than 3 pounds in 1 day or 5 pounds in 1 week.    Schedule cardioversion with Heather [phone] next week    Blood work in 2 weeks

## 2021-07-02 NOTE — PROGRESS NOTES
Chief Complaint   Patient presents with   • Supraventricular Tachycardia (SVT)   • Atrial Fibrillation   • Hyperlipidemia   • HTN (Controlled)       Subjective:   Rajesh De Luna is a 75 y.o. male who presents today for A. Fib and hospital follow-up after recent admission on 6/10/2021 for A. fib with RVR and new heart failure after he was seen by myself in the office.  Patient was cardioverted on 6/12/2021.  He was also sent for coronary angiogram was found to have coronary artery disease s/p PCI/DONOVAN to left circumflex.  Patient did return to A. fib but declined second cardioversion.  Patient was last seen by Linda Scott on 6/18/2021.  Patient was also seen by Dr. Ruiz while inpatient.    Patient's past medical history of A. fib, hypertension, and alcohol use    Today, patient feels well, denies chest pain, shortness of breath, palpitations, dizziness/lightheadedness, orthopnea, PND or Edema.  Patient reports his home weight is stable at 167 pounds.  Patient reports he can ambulate approximately 1 to 2 miles without YING.  Patient reports medication compliance.      Patient notes he does not have a personal phone.  We have been previously unsuccessful in reaching him to schedule cardioversion, patient needs voicemail to be left at the [facility] and he will return our call.  Contact information updated at checkout.    Patient's heart rate continues to be elevated and irregular in office; we are limited by his blood pressure.  Encouraged following up with scheduling for cardioversion.  Otherwise, we will consider referral to EP for possible ablation.    Based on physical examination findings, patient is euvolemic. No JVD, lungs are clear to auscultation, no pitting edema in bilateral lower extremities, no ascites. Dry weight is 167 lbs.    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 No pertinent surgical history.  History  reviewed. No pertinent family history.  Social History     Socioeconomic History   • Marital status: Single     Spouse name: Not on file   • Number of children: Not on file   • Years of education: Not on file   • Highest education level: Not on file   Occupational History   • Not on file   Tobacco Use   • Smoking status: Current Some Day Smoker   • Smokeless tobacco: Never Used   • Tobacco comment: Cigars occasionally   Vaping Use   • Vaping Use: Never used   Substance and Sexual Activity   • Alcohol use: Yes     Alcohol/week: 1.8 oz     Types: 3 Cans of beer per week   • Drug use: Never   • Sexual activity: Not on file   Other Topics Concern   • Not on file   Social History Narrative   • Not on file     Social Determinants of Health     Financial Resource Strain:    • Difficulty of Paying Living Expenses:    Food Insecurity:    • Worried About Running Out of Food in the Last Year:    • Ran Out of Food in the Last Year:    Transportation Needs:    • Lack of Transportation (Medical):    • Lack of Transportation (Non-Medical):    Physical Activity:    • Days of Exercise per Week:    • Minutes of Exercise per Session:    Stress:    • Feeling of Stress :    Social Connections:    • Frequency of Communication with Friends and Family:    • Frequency of Social Gatherings with Friends and Family:    • Attends Gn[employer] Services:    • Active Member of Clubs or Organizations:    • Attends Club or Organization Meetings:    • Marital Status:    Intimate Partner Violence:    • Fear of Current or Ex-Partner:    • Emotionally Abused:    • Physically Abused:    • Sexually Abused:      Allergies   Allergen Reactions   • Penicillins Unspecified     Last dose  > 60 years ago       Outpatient Encounter Medications as of 7/2/2021   Medication Sig Dispense Refill   • furosemide (LASIX) 20 MG Tab Take 1 tablet by mouth 1 time a day as needed (for weight gain greater than 3 pounds in 1 day or 5 pounds in 1 week) 30 tablet 11   • lisinopril  "(PRINIVIL) 5 MG Tab Take 1 tablet by mouth every day. 30 tablet 11   • metoprolol SR (TOPROL XL) 50 MG TABLET SR 24 HR Take 1 tablet by mouth every evening. 30 tablet 11   • spironolactone (ALDACTONE) 25 MG Tab Take 1 tablet by mouth every day. 30 tablet 11   • apixaban (ELIQUIS) 5mg Tab Take 1 tablet by mouth 2 times a day. 60 tablet 11   • atorvastatin (LIPITOR) 40 MG Tab Take 1 tablet by mouth every day. 90 tablet 1   • clopidogrel (PLAVIX) 75 MG Tab Take 1 tablet by mouth every day. 90 tablet 1   • amiodarone (CORDARONE) 200 MG Tab Take 1 tablet by mouth every day. 30 tablet 11   • triamcinolone acetonide (KENALOG) 0.1 % Cream Apply 1 Application to affected area(s) 2 times a day. (Patient taking differently: Apply 1 Application topically 2 times a day as needed.) 30 g 0   • [DISCONTINUED] furosemide (LASIX) 20 MG Tab Take 1 tablet by mouth every day for 30 days. 30 tablet 11     No facility-administered encounter medications on file as of 7/2/2021.     Review of Systems   Constitutional: Negative for fever, malaise/fatigue and weight loss.   Eyes: Negative for blurred vision.   Respiratory: Negative for cough and shortness of breath.    Cardiovascular: Negative for chest pain, palpitations, orthopnea, claudication, leg swelling and PND.   Gastrointestinal: Negative for abdominal pain, blood in stool, nausea and vomiting.   Genitourinary: Negative for dysuria, frequency and hematuria.   Musculoskeletal: Negative for falls and myalgias.   Neurological: Negative for dizziness, tingling and loss of consciousness.   Endo/Heme/Allergies: Does not bruise/bleed easily.        Objective:   BP (!) 90/50 (BP Location: Right arm, Patient Position: Sitting, BP Cuff Size: Adult)   Pulse (!) 118   Ht 1.778 m (5' 10\")   Wt 75.8 kg (167 lb)   SpO2 96%   BMI 23.96 kg/m²     Physical Exam   Constitutional: He is oriented to person, place, and time. He appears well-developed. No distress.   HENT:   Head: Normocephalic and " atraumatic.   Eyes: Pupils are equal, round, and reactive to light.   Neck: No JVD present.   Cardiovascular: Normal heart sounds and normal pulses. An irregular rhythm present. Tachycardia present. Exam reveals no gallop and no friction rub.   No murmur heard.  Pulmonary/Chest: Effort normal and breath sounds normal. No respiratory distress.   Abdominal: Soft. Normal appearance and bowel sounds are normal. He exhibits no distension. There is no abdominal tenderness.   Neurological: He is alert and oriented to person, place, and time.   Skin: Skin is warm and dry. No erythema.   Psychiatric: His behavior is normal. Mood normal.   Vitals reviewed.    Lab Results   Component Value Date/Time    CHOLSTRLTOT 108 06/13/2021 02:35 AM    LDL 53 06/13/2021 02:35 AM    HDL 40 06/13/2021 02:35 AM    TRIGLYCERIDE 75 06/13/2021 02:35 AM       Lab Results   Component Value Date/Time    SODIUM 142 06/24/2021 09:29 AM    POTASSIUM 3.9 06/24/2021 09:29 AM    CHLORIDE 100 06/24/2021 09:29 AM    CO2 28 06/24/2021 09:29 AM    GLUCOSE 80 06/24/2021 09:29 AM    BUN 21 06/24/2021 09:29 AM    CREATININE 1.44 (H) 06/24/2021 09:29 AM     Lab Results   Component Value Date/Time    ALKPHOSPHAT 132 (H) 06/11/2021 01:19 AM    ASTSGOT 21 06/11/2021 01:19 AM    ALTSGPT 19 06/11/2021 01:19 AM    TBILIRUBIN 1.0 06/11/2021 01:19 AM       Ref. Range 6/3/2021 01:16   TSH Latest Ref Range: 0.380 - 5.330 uIU/mL 3.340      Ref. Range 6/2/2021 01:16   NT-proBNP Latest Ref Range: 0 - 125 pg/mL 4828 (H)     Echo (6/2/2021): Severely reduced left ventricular systolic function.  Global hypokinesis.  Reduced right ventricular systolic function.  Severely dilated left atrium - prominence of the posterior LA wall-   possible thrombus/mass lesion- consider further evaluation with PORTILLO or   CT/MRI. Mild mitral regurgitation.  Transesophageal Echo Report 6/11/2021  1. Normal left atrial appendage. No thrombus detected in the left   atrial appendage.      2. The  aortic valve is heavily calcified.  There is partial fusion of   all three leaflets.  There is severe aortic stenosis versus   pseudostenois.      3. Severely reduced left ventricular systolic function.  Left   ventricular ejection fraction is visually estimated to be 15%.     Coronary angiogram 6/12/2021  PREOPERATIVE DIAGNOSIS:  1.  Acute decompensated heart failure with reduced ejection fraction  2.  History of alcohol abuse  3.  Non-STEMI  4.  A. fib RVR     POSTOPERATIVE DIAGNOSIS:  1.  99% culprit LCx stenosis  2.  Calcific 50 to 60% proximal LAD stenosis and moderate nonobstructive RCA disease  3.  LVEDP 16 mmHg  4.  Successful PCI culprit LCx with Preston DONOVAN     PROCEDURE PERFORMED:  Selective coronary angiography of the native vessels  Left heart catheterization  Left ventriculogram  PCI of LCx DONOVAN  Supervision moderate sedation    Assessment:     1. Persistent atrial fibrillation (HCC)  CL-CARDIOVERSION    REFERRAL TO CARDIOLOGY    REFERRAL TO PHARMACOTHERAPY SERVICE    Basic Metabolic Panel   2. High risk medication use  CL-CARDIOVERSION    REFERRAL TO CARDIOLOGY    REFERRAL TO PHARMACOTHERAPY SERVICE    Basic Metabolic Panel   3. ACC/AHA stage C systolic heart failure (HCC)  CL-CARDIOVERSION    REFERRAL TO CARDIOLOGY    REFERRAL TO PHARMACOTHERAPY SERVICE    Basic Metabolic Panel   4. Aortic valve stenosis, etiology of cardiac valve disease unspecified  CL-CARDIOVERSION    REFERRAL TO CARDIOLOGY    REFERRAL TO PHARMACOTHERAPY SERVICE    Basic Metabolic Panel   5. Heart failure, NYHA class 3 (HCC)  CL-CARDIOVERSION    REFERRAL TO CARDIOLOGY    REFERRAL TO PHARMACOTHERAPY SERVICE    Basic Metabolic Panel   6. S/P drug eluting coronary stent placement  CL-CARDIOVERSION    REFERRAL TO CARDIOLOGY    REFERRAL TO PHARMACOTHERAPY SERVICE    Basic Metabolic Panel   7. Hyperlipidemia, unspecified hyperlipidemia type  CL-CARDIOVERSION    REFERRAL TO CARDIOLOGY    REFERRAL TO PHARMACOTHERAPY SERVICE    Basic Metabolic  Panel       Medical Decision Making:  Today's Assessment / Status / Plan:   Persistent atrial fibrillation  -Recently discharged on 6/5/2021 and 6/10/2021 for A. Fib.  -[id number] Score 4  -Continue Eliquis 5 mg twice daily  -Patient to reduce amiodarone on 6/28 to 200 mg daily.  Continue.   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  -Labs: BMP in 2 week  -Repeat Echo in 3 months, if LVEF not >35%, then will discuss/consider ICD for primary Prevention.   -Reinforced s/sx of worsening heart failure with patient and weight monitoring. Pt verbalizes understanding. Pt to call office if present.    -Heart Failure Education: pt to be contacted by HF nurse for further education,   -We will refer to pharmacotherapy clinic for heart failure GDMT titration assistance    CAD s/p PCI/DONOVAN to left circumflex on 6/12/2021; hyperlipidemia  -Recent LDL 53 6/13/2021  -No triple therapy due to high bleeding risk  -Continue Plavix 75 mg daily  -Continue Eliquis  -Continue atorvastatin 40 mg daily  -Beta-blocker per above    Alcohol use; cigar use  -Patient reports continued alcohol cessation.  Patient reports he does continue cigar use every evening.    Severe AS versus pseudostenosis; Dilated aortic root at 3.6 cm on 6/1/2021  -Continue to monitor follow-up echo  -We will refer to structural heart clinic for continued monitoring    FU in clinic in 1 month. Sooner if needed.    Patient verbalizes understanding and agrees with the plan of care.      Collaborating MD: Dr. Tarah MD    PLEASE NOTE: This Note was created using voice recognition Software. I have made every reasonable attempt to correct obvious errors, but I expect that there are errors of grammar and possibly content that I did not discover before finalizing the note

## 2021-07-06 PROCEDURE — RXMED WILLOW AMBULATORY MEDICATION CHARGE: Performed by: NURSE PRACTITIONER

## 2021-07-07 ENCOUNTER — TELEPHONE (OUTPATIENT)
Dept: CARDIOLOGY | Facility: MEDICAL CENTER | Age: 76
End: 2021-07-07

## 2021-07-07 ENCOUNTER — TELEPHONE (OUTPATIENT)
Dept: CARDIOTHORACIC SURGERY | Facility: MEDICAL CENTER | Age: 76
End: 2021-07-07

## 2021-07-07 DIAGNOSIS — I35.0 SEVERE AORTIC STENOSIS: ICD-10-CM

## 2021-07-07 NOTE — TELEPHONE ENCOUNTER
"Attempted to contact patient regarding SHP referral placed by OTILIA Powers. Phone call direct to voice message for \"[facility]\". No other phone numbers listed on patient's records.     This RN will mail letter regarding SHP referral to home address on file.     [facility] letter has been sent to patient's address on file.   "

## 2021-07-07 NOTE — LETTER
"July 7, 2021         Dear Rajesh De Luna,    You have been referred by your cardiology provider, OTILIA Powers, for a consultation with [facility].     Multiple phone call attempts to the number listed in your records, [phone], have been made to contact you regarding such referral. Unfortunately, this phone number listed is the voice mail box of \"[facility]\", and our team has been unsuccessful at contacting you directly regarding this referral.     If you would like to proceed with scheduling a consultation with [facility]'s [facility], please contact the valve coordinator at [phone].    Thank you,    Your [facility] Team                                  "

## 2021-07-07 NOTE — TELEPHONE ENCOUNTER
Called patient to schedule consult with CTS, however, phone is answered by apartment complex answering machine.  Did not leave message as I was unsure if voicemail is HIPAA secure.

## 2021-07-11 PROCEDURE — RXMED WILLOW AMBULATORY MEDICATION CHARGE: Performed by: NURSE PRACTITIONER

## 2021-07-15 ENCOUNTER — NON-PROVIDER VISIT (OUTPATIENT)
Dept: CARDIOLOGY | Facility: MEDICAL CENTER | Age: 76
End: 2021-07-15
Payer: MEDICARE

## 2021-07-15 ENCOUNTER — PHARMACY VISIT (OUTPATIENT)
Dept: PHARMACY | Facility: MEDICAL CENTER | Age: 76
End: 2021-07-15
Payer: MEDICARE

## 2021-07-15 VITALS
DIASTOLIC BLOOD PRESSURE: 76 MMHG | BODY MASS INDEX: 24.39 KG/M2 | SYSTOLIC BLOOD PRESSURE: 98 MMHG | HEART RATE: 104 BPM | WEIGHT: 170 LBS

## 2021-07-15 DIAGNOSIS — I50.9 HEART FAILURE, NYHA CLASS 3 (HCC): Primary | ICD-10-CM

## 2021-07-15 PROCEDURE — RXMED WILLOW AMBULATORY MEDICATION CHARGE: Performed by: NURSE PRACTITIONER

## 2021-07-15 PROCEDURE — 99211 OFF/OP EST MAY X REQ PHY/QHP: CPT | Performed by: NURSE PRACTITIONER

## 2021-07-15 RX ORDER — METOPROLOL SUCCINATE 100 MG/1
100 TABLET, EXTENDED RELEASE ORAL DAILY
Qty: 30 TABLET | Refills: 11 | Status: SHIPPED | OUTPATIENT
Start: 2021-07-15 | End: 2021-09-02 | Stop reason: SDUPTHER

## 2021-07-15 ASSESSMENT — FIBROSIS 4 INDEX: FIB4 SCORE: 2.32

## 2021-07-15 NOTE — NON-PROVIDER
CHF Pharmacotherapy visit - Visit    Date of Service: 07/15/21    Informed written consent was given on: Unable to give    Rajesh De Luna is here for CHF and lipids    HPI  Pertinent Interval History since last visit:   • This is pt's baseline visit    Most recent EF:  25%      Current Outpatient Medications:   •  metoprolol SR, 100 mg, Oral, DAILY  •  furosemide, 20 mg, Oral, QDAY PRN  •  lisinopril, 5 mg, Oral, DAILY  •  spironolactone, 25 mg, Oral, DAILY  •  apixaban, 5 mg, Oral, BID  •  atorvastatin, 40 mg, Oral, DAILY  •  clopidogrel, 75 mg, Oral, DAILY  •  amiodarone, 200 mg, Oral, DAILY  •  triamcinolone acetonide, 1 Application, Topical, BID (Patient taking differently: 1 Application, Topical, 2 TIMES DAILY PRN)    Current Adherence to CHF Therapies:  Complete    Current CHF Medications - including dose:   • Entresto or ACE/ARB:lisinopril 5 mg daily  • Beta blocker: metoprolol 50 mg daily  • Diuretic: furosemide 20 mg daily  • Aldosterone antagonist: spironolactone 25 mg daily    Current lipid medications:  • Atovastatin 40 mg daily    Vitals:    07/15/21 1046   BP: (!) 98/76   Pulse: (!) 104       Home BP and HR:  Checks weekly; does not write it down    Change in weight: Gained 3 lbs    Exercise habits: no regular exercise program     Diet: common adult    SOCIAL HISTORY  Social History     Tobacco Use   Smoking Status Current Some Day Smoker   Smokeless Tobacco Never Used   Tobacco Comment    Cigars occasionally        DATA REVIEW  No results found for: HBA1C       Lab Results   Component Value Date/Time    CHOLSTRLTOT 108 06/13/2021 02:35 AM    LDL 53 06/13/2021 02:35 AM    HDL 40 06/13/2021 02:35 AM    TRIGLYCERIDE 75 06/13/2021 02:35 AM       Lab Results   Component Value Date/Time    SODIUM 142 06/24/2021 09:29 AM    POTASSIUM 3.9 06/24/2021 09:29 AM    CHLORIDE 100 06/24/2021 09:29 AM    CO2 28 06/24/2021 09:29 AM    GLUCOSE 80 06/24/2021 09:29 AM    BUN 21 06/24/2021 09:29 AM    CREATININE 1.44  (H) 06/24/2021 09:29 AM     Lab Results   Component Value Date/Time    ALKPHOSPHAT 132 (H) 06/11/2021 01:19 AM    ASTSGOT 21 06/11/2021 01:19 AM    ALTSGPT 19 06/11/2021 01:19 AM    TBILIRUBIN 1.0 06/11/2021 01:19 AM    INR 1.40 (H) 06/10/2021 02:38 PM    ALBUMIN 3.6 06/11/2021 01:19 AM      No components found for: MICROALBUMINCREATRATIOURINE    Renal function:  Calculated creatinine clearance: 45 ml/min     Other Pertinent Blood Work:     Other:  Immunization History   Administered Date(s) Administered   • Pneumococcal polysaccharide vaccine (PPSV-23) 09/23/2015     Up to date on pneumococcal vaccine? yes      Recent Imaging Studies:    None since last visit      ASSESSMENT AND PLAN  • This is pt's initial visit.  • Educated pt on basic pathophysiology and medications for CHF and lipids  • Pt did gain 3 lbs since last visit with cardiology APRN, but denies SOB  • BP wnl, but pt was tachycardic in clinic today    Resulting CHF medications today (changes are bolded)  • Entresto or ACE/ARB: Continue lisinopril 5 mg daily  • Beta blocker: Increase metoprolol XL to 100 mg daily  • Diuretic:Continue furosemide 20 mg daily  • Aldosterone antagonist: Continue sprionolactone 25 mg daily    lipid medications (changes are bolded)  • Continue atorvastatin 40 mg daily    Lifestyle Recommendations From Today's Visit:   • Limit fluid intake to 2L/day  • Limit Na+ intake to 2gm/day     Significant changes to laboratory values since last visit that require repeat labs:  • Renal fxn    Blood Work Ordered At Today's visit:   Pending BMP ordered by OTILIA Allen    Studies Ordered at Todays Visit:  None     Follow-Up:   2 weeks    Shadia Koch, PharmD      CC:  Pcp Pt States None  Tiffany Powers A.*    Medications reconciled  Flow sheets updated

## 2021-07-15 NOTE — PATIENT INSTRUCTIONS
·  Increase metoprolol to 100 mg every day  · Continue lisinopril 5 mg every day  · Continue spironolactone 25 mg daily  · Continue furosemide 20 mg daily  · Continue atorvastatin 40 mg daily  · Get your labs drawn  · Stay away from salty foods      [facility]  [address] [place] [id number]         Please call [phone] with any questions.

## 2021-07-23 ENCOUNTER — HOSPITAL ENCOUNTER (OUTPATIENT)
Dept: LAB | Facility: MEDICAL CENTER | Age: 76
End: 2021-07-23
Attending: NURSE PRACTITIONER
Payer: MEDICARE

## 2021-07-23 DIAGNOSIS — Z79.899 HIGH RISK MEDICATION USE: ICD-10-CM

## 2021-07-23 DIAGNOSIS — E78.5 HYPERLIPIDEMIA, UNSPECIFIED HYPERLIPIDEMIA TYPE: ICD-10-CM

## 2021-07-23 DIAGNOSIS — I35.0 AORTIC VALVE STENOSIS, ETIOLOGY OF CARDIAC VALVE DISEASE UNSPECIFIED: ICD-10-CM

## 2021-07-23 DIAGNOSIS — I50.9 HEART FAILURE, NYHA CLASS 3 (HCC): ICD-10-CM

## 2021-07-23 DIAGNOSIS — Z95.5 S/P DRUG ELUTING CORONARY STENT PLACEMENT: ICD-10-CM

## 2021-07-23 DIAGNOSIS — I48.19 PERSISTENT ATRIAL FIBRILLATION (HCC): ICD-10-CM

## 2021-07-23 DIAGNOSIS — I50.20 ACC/AHA STAGE C SYSTOLIC HEART FAILURE (HCC): ICD-10-CM

## 2021-07-23 LAB
ANION GAP SERPL CALC-SCNC: 12 MMOL/L (ref 7–16)
BUN SERPL-MCNC: 27 MG/DL (ref 8–22)
CALCIUM SERPL-MCNC: 9.4 MG/DL (ref 8.5–10.5)
CHLORIDE SERPL-SCNC: 104 MMOL/L (ref 96–112)
CO2 SERPL-SCNC: 24 MMOL/L (ref 20–33)
CREAT SERPL-MCNC: 1.21 MG/DL (ref 0.5–1.4)
GLUCOSE SERPL-MCNC: 84 MG/DL (ref 65–99)
POTASSIUM SERPL-SCNC: 4.4 MMOL/L (ref 3.6–5.5)
SODIUM SERPL-SCNC: 140 MMOL/L (ref 135–145)

## 2021-07-23 PROCEDURE — 36415 COLL VENOUS BLD VENIPUNCTURE: CPT

## 2021-07-23 PROCEDURE — 80048 BASIC METABOLIC PNL TOTAL CA: CPT

## 2021-07-29 ENCOUNTER — NON-PROVIDER VISIT (OUTPATIENT)
Dept: CARDIOLOGY | Facility: MEDICAL CENTER | Age: 76
End: 2021-07-29
Payer: MEDICARE

## 2021-07-29 VITALS
HEART RATE: 77 BPM | DIASTOLIC BLOOD PRESSURE: 63 MMHG | BODY MASS INDEX: 24.39 KG/M2 | SYSTOLIC BLOOD PRESSURE: 80 MMHG | WEIGHT: 170 LBS

## 2021-07-29 DIAGNOSIS — I50.9 HEART FAILURE, NYHA CLASS 3 (HCC): Primary | ICD-10-CM

## 2021-07-29 DIAGNOSIS — E11.65 TYPE 2 DIABETES MELLITUS WITH HYPERGLYCEMIA, WITHOUT LONG-TERM CURRENT USE OF INSULIN (HCC): ICD-10-CM

## 2021-07-29 PROCEDURE — RXMED WILLOW AMBULATORY MEDICATION CHARGE: Performed by: NURSE PRACTITIONER

## 2021-07-29 PROCEDURE — 99211 OFF/OP EST MAY X REQ PHY/QHP: CPT | Performed by: INTERNAL MEDICINE

## 2021-07-29 RX ORDER — LISINOPRIL 2.5 MG/1
2.5 TABLET ORAL DAILY
Qty: 30 TABLET | Refills: 11 | Status: SHIPPED | OUTPATIENT
Start: 2021-07-29 | End: 2021-08-02

## 2021-07-29 ASSESSMENT — FIBROSIS 4 INDEX: FIB4 SCORE: 2.32

## 2021-07-29 NOTE — PROGRESS NOTES
Chief Complaint   Patient presents with   • Atrial Fibrillation     F/V Dx: Persistent atrial fibrillation (HCC)       Subjective:   Rajesh De Luna is a 75 y.o. male who presents today for A. Fib and heart failure follow-up after recent admission on 6/10/2021 for A. fib with RVR and new heart failure after he was seen by myself in the office.  Patient was cardioverted on 6/12/2021.  He was also sent for coronary angiogram was found to have coronary artery disease s/p PCI/DONOVAN to left circumflex.  Patient did return to A. fib but declined second cardioversion.  Patient was last seen by Linda Scott on 7/2/2021.  Patient was also seen by Dr. Ruiz and pharmacotherapy clinic.    Patient's past medical history of A. fib, hypertension, and alcohol use    Patient was seen by pharmacotherapy clinic last week, where they decreased his lisinopril and are holding his furosemide due to hypotension in office.    Today, patient reports general fatigue. Patient denies chest pain, shortness of breath, palpitations, dizziness/lightheadedness, orthopnea, PND or Edema. Patient has not been checking his weight at home. Denies YING with activity. Patient reports medication compliance.     Patient's heart rate continues to be elevated and irregular in office; we are limited by his blood pressure.  Discussed afib and association with symptoms; patient would now like to proceed with cardioversion. Due communication barriers, cardioversion and instructions will be provided before patient leaves clinic today.     Today, based on physical examination findings, patient is euvolemic. No JVD, lungs are clear to auscultation, no pitting edema in bilateral lower extremities, no ascites. Dry weight is 167 lbs.    Patient notes he does not have a personal phone.  We have been previously unsuccessful in reaching him to schedule cardioversion, patient needs voicemail to be left at the [facility] and he will return our call.  Contact information  updated at checkout.    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 No pertinent surgical history.  History reviewed. No pertinent family history.  Social History     Socioeconomic History   • Marital status: Single     Spouse name: Not on file   • Number of children: Not on file   • Years of education: Not on file   • Highest education level: Not on file   Occupational History   • Not on file   Tobacco Use   • Smoking status: Current Some Day Smoker   • Smokeless tobacco: Never Used   • Tobacco comment: Cigars occasionally   Vaping Use   • Vaping Use: Never used   Substance and Sexual Activity   • Alcohol use: Yes     Alcohol/week: 1.8 oz     Types: 3 Cans of beer per week   • Drug use: Never   • Sexual activity: Not on file   Other Topics Concern   • Not on file   Social History Narrative   • Not on file     Social Determinants of Health     Financial Resource Strain:    • Difficulty of Paying Living Expenses:    Food Insecurity:    • Worried About Running Out of Food in the Last Year:    • Ran Out of Food in the Last Year:    Transportation Needs:    • Lack of Transportation (Medical):    • Lack of Transportation (Non-Medical):    Physical Activity:    • Days of Exercise per Week:    • Minutes of Exercise per Session:    Stress:    • Feeling of Stress :    Social Connections:    • Frequency of Communication with Friends and Family:    • Frequency of Social Gatherings with Friends and Family:    • Attends Mu-ism Services:    • Active Member of Clubs or Organizations:    • Attends Club or Organization Meetings:    • Marital Status:    Intimate Partner Violence:    • Fear of Current or Ex-Partner:    • Emotionally Abused:    • Physically Abused:    • Sexually Abused:      Allergies   Allergen Reactions   • Penicillins Unspecified     Last dose  > 60 years ago       Outpatient Encounter Medications as of 8/2/2021   Medication  "Sig Dispense Refill   • metoprolol SR (TOPROL XL) 100 MG TABLET SR 24 HR Take 1 tablet by mouth every day. 30 tablet 11   • spironolactone (ALDACTONE) 25 MG Tab Take 1 tablet by mouth every day. 30 tablet 11   • apixaban (ELIQUIS) 5mg Tab Take 1 tablet by mouth 2 times a day. 60 tablet 11   • atorvastatin (LIPITOR) 40 MG Tab Take 1 tablet by mouth every day. 90 tablet 1   • clopidogrel (PLAVIX) 75 MG Tab Take 1 tablet by mouth every day. 90 tablet 1   • amiodarone (CORDARONE) 200 MG Tab Take 1 tablet by mouth every day. 30 tablet 11   • triamcinolone acetonide (KENALOG) 0.1 % Cream Apply 1 Application to affected area(s) 2 times a day. (Patient taking differently: Apply 1 Application topically 2 times a day as needed.) 30 g 0   • [DISCONTINUED] furosemide (LASIX) 40 MG Tab  (Patient not taking: Reported on 8/2/2021)     • [DISCONTINUED] lisinopril (PRINIVIL) 2.5 MG Tab Take 1 tablet by mouth every day. 30 tablet 11     No facility-administered encounter medications on file as of 8/2/2021.     Review of Systems   Constitutional: Positive for malaise/fatigue. Negative for fever and weight loss.   Eyes: Negative for blurred vision.   Respiratory: Negative for cough and shortness of breath.    Cardiovascular: Negative for chest pain, palpitations, orthopnea, claudication, leg swelling and PND.   Gastrointestinal: Negative for abdominal pain, blood in stool, nausea and vomiting.   Genitourinary: Negative for dysuria, frequency and hematuria.   Musculoskeletal: Negative for falls and myalgias.   Neurological: Negative for dizziness, tingling and loss of consciousness.   Endo/Heme/Allergies: Does not bruise/bleed easily.        Objective:   BP (!) 84/54 (BP Location: Right arm, Patient Position: Sitting, BP Cuff Size: Adult)   Pulse 99   Ht 1.778 m (5' 10\")   Wt 76.2 kg (168 lb)   SpO2 94%   BMI 24.11 kg/m²     Physical Exam   Constitutional: He is oriented to person, place, and time. He appears well-developed. "   HENT:   Head: Normocephalic and atraumatic.   Eyes: Pupils are equal, round, and reactive to light.   Neck: No JVD present.   Cardiovascular: Normal rate and normal heart sounds. An irregular rhythm present. Exam reveals no gallop and no friction rub.   No murmur heard.  Pulmonary/Chest: Effort normal and breath sounds normal. No respiratory distress.   Abdominal: Soft. Bowel sounds are normal. He exhibits no distension.   Musculoskeletal:      Right lower leg: No edema.      Left lower leg: No edema.   Neurological: He is alert and oriented to person, place, and time.   Skin: Skin is dry. No erythema.   Psychiatric: His behavior is normal.   Vitals reviewed.    Lab Results   Component Value Date/Time    CHOLSTRLTOT 108 06/13/2021 02:35 AM    LDL 53 06/13/2021 02:35 AM    HDL 40 06/13/2021 02:35 AM    TRIGLYCERIDE 75 06/13/2021 02:35 AM       Lab Results   Component Value Date/Time    SODIUM 140 07/23/2021 10:41 AM    POTASSIUM 4.4 07/23/2021 10:41 AM    CHLORIDE 104 07/23/2021 10:41 AM    CO2 24 07/23/2021 10:41 AM    GLUCOSE 84 07/23/2021 10:41 AM    BUN 27 (H) 07/23/2021 10:41 AM    CREATININE 1.21 07/23/2021 10:41 AM     Lab Results   Component Value Date/Time    ALKPHOSPHAT 132 (H) 06/11/2021 01:19 AM    ASTSGOT 21 06/11/2021 01:19 AM    ALTSGPT 19 06/11/2021 01:19 AM    TBILIRUBIN 1.0 06/11/2021 01:19 AM       Ref. Range 6/3/2021 01:16   TSH Latest Ref Range: 0.380 - 5.330 uIU/mL 3.340      Ref. Range 6/2/2021 01:16   NT-proBNP Latest Ref Range: 0 - 125 pg/mL 4828 (H)     Echo (6/2/2021): Severely reduced left ventricular systolic function.  Global hypokinesis.  Reduced right ventricular systolic function.  Severely dilated left atrium - prominence of the posterior LA wall-   possible thrombus/mass lesion- consider further evaluation with PORTILLO or   CT/MRI. Mild mitral regurgitation.  Transesophageal Echo Report 6/11/2021  1. Normal left atrial appendage. No thrombus detected in the left   atrial appendage.       2. The aortic valve is heavily calcified.  There is partial fusion of   all three leaflets.  There is severe aortic stenosis versus   pseudostenois.      3. Severely reduced left ventricular systolic function.  Left   ventricular ejection fraction is visually estimated to be 15%.     Coronary angiogram 6/12/2021  PREOPERATIVE DIAGNOSIS:  1.  Acute decompensated heart failure with reduced ejection fraction  2.  History of alcohol abuse  3.  Non-STEMI  4.  A. fib RVR     POSTOPERATIVE DIAGNOSIS:  1.  99% culprit LCx stenosis  2.  Calcific 50 to 60% proximal LAD stenosis and moderate nonobstructive RCA disease  3.  LVEDP 16 mmHg  4.  Successful PCI culprit LCx with Cecilio DONOVAN     PROCEDURE PERFORMED:  Selective coronary angiography of the native vessels  Left heart catheterization  Left ventriculogram  PCI of LCx DONOVAN  Supervision moderate sedation    Assessment:     1. Heart failure, NYHA class 3 (HCC)  CL-CARDIOVERSION   2. Severe aortic stenosis  CL-CARDIOVERSION   3. High risk medication use  CL-CARDIOVERSION   4. Persistent atrial fibrillation (HCC)  CL-CARDIOVERSION   5. ACC/AHA stage C systolic heart failure (HCC)  CL-CARDIOVERSION   6. S/P drug eluting coronary stent placement  CL-CARDIOVERSION   7. Hyperlipidemia, unspecified hyperlipidemia type  CL-CARDIOVERSION   8. Chronic anticoagulation  CL-CARDIOVERSION   9. Coronary artery disease involving native coronary artery of native heart without angina pectoris  CL-CARDIOVERSION       Medical Decision Making:  Today's Assessment / Status / Plan:   Persistent atrial fibrillation  -Recently discharged on 6/5/2021 and 6/10/2021 for BELIA Fib.  -[id number] Score 4  -Continue Eliquis 5 mg twice daily  -Patient to reduce amiodarone on 6/28 to 200 mg daily.  Continue.   -Continue metoprolol  mg daily  -Patient scheduled today for outpatient cardioversion next week.     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  -Repeat Echo in 1 month, if LVEF not >35%, then will discuss/consider ICD for primary Prevention.   -Reinforced s/sx of worsening heart failure with patient and weight monitoring. Pt verbalizes understanding. Pt to call office if present.    -Heart Failure Education: pt to be contacted by HF nurse for further education,   -We will refer to pharmacotherapy clinic for heart failure GDMT titration assistance    CAD s/p PCI/DONOVAN to left circumflex on 6/12/2021; hyperlipidemia  -Recent LDL 53 6/13/2021  -No triple therapy due to high bleeding risk  -Continue Plavix 75 mg daily  -Continue Eliquis  -Continue atorvastatin 40 mg daily  -Beta-blocker per above    Alcohol use; cigar use  -Patient reports continued alcohol cessation.  Patient reports he does continue cigar use every evening.    Severe AS versus pseudostenosis; Dilated aortic root at 3.6 cm on 6/1/2021  -Continue to monitor follow-up echo  -Referred to structural heart clinic for continued monitoring    FU in clinic in 1 month. Sooner if needed.    Patient verbalizes understanding and agrees with the plan of care.     Collaborating MD: Dr. Sara MD    PLEASE NOTE: This Note was created using voice recognition Software. I have made every reasonable attempt to correct obvious errors, but I expect that there are errors of grammar and possibly content that I did not discover before finalizing the note

## 2021-07-29 NOTE — NON-PROVIDER
CHF Pharmacotherapy visit - Visit    Date of Service: 07/15/21    Informed written consent was given on: 7/29/21    Rajesh De Luna is here for CHF and lipids    HPI  Pertinent Interval History since last visit:   • Pt reports feeling more fatigue during the day than usual, which is causing insomnia.    Most recent EF:  25%      Current Outpatient Medications:   •  lisinopril, 2.5 mg, Oral, DAILY  •  metoprolol SR, 100 mg, Oral, DAILY  •  spironolactone, 25 mg, Oral, DAILY  •  apixaban, 5 mg, Oral, BID  •  atorvastatin, 40 mg, Oral, DAILY  •  clopidogrel, 75 mg, Oral, DAILY  •  amiodarone, 200 mg, Oral, DAILY  •  triamcinolone acetonide, 1 Application, Topical, BID (Patient taking differently: 1 Application, Topical, 2 TIMES DAILY PRN)    Current Adherence to CHF Therapies:  Complete    Current CHF Medications - including dose:   • Entresto or ACE/ARB:lisinopril 5 mg daily  • Beta blocker: metoprolol 100 mg daily  • Diuretic: furosemide 20 mg daily (pt is not taking it PRN as prescribed)  • Aldosterone antagonist: spironolactone 25 mg daily    Current lipid medications:  • Atovastatin 40 mg daily    Vitals:    07/29/21 0958   BP: (!) 80/63   Pulse: 77       Home BP and HR:  Checks weekly; does not write it down    Change in weight: Stable    Exercise habits: no regular exercise program     Diet: common adult    SOCIAL HISTORY  Social History     Tobacco Use   Smoking Status Current Some Day Smoker   Smokeless Tobacco Never Used   Tobacco Comment    Cigars occasionally        DATA REVIEW  No results found for: HBA1C       Lab Results   Component Value Date/Time    CHOLSTRLTOT 108 06/13/2021 02:35 AM    LDL 53 06/13/2021 02:35 AM    HDL 40 06/13/2021 02:35 AM    TRIGLYCERIDE 75 06/13/2021 02:35 AM       Lab Results   Component Value Date/Time    SODIUM 140 07/23/2021 10:41 AM    POTASSIUM 4.4 07/23/2021 10:41 AM    CHLORIDE 104 07/23/2021 10:41 AM    CO2 24 07/23/2021 10:41 AM    GLUCOSE 84 07/23/2021 10:41 AM    BUN  27 (H) 07/23/2021 10:41 AM    CREATININE 1.21 07/23/2021 10:41 AM     Lab Results   Component Value Date/Time    ALKPHOSPHAT 132 (H) 06/11/2021 01:19 AM    ASTSGOT 21 06/11/2021 01:19 AM    ALTSGPT 19 06/11/2021 01:19 AM    TBILIRUBIN 1.0 06/11/2021 01:19 AM    INR 1.40 (H) 06/10/2021 02:38 PM    ALBUMIN 3.6 06/11/2021 01:19 AM      No components found for: MICROALBUMINCREATRATIOURINE    Renal function:  Calculated creatinine clearance: 55 ml/min     Other Pertinent Blood Work:     Other:  Immunization History   Administered Date(s) Administered   • Pneumococcal polysaccharide vaccine (PPSV-23) 09/23/2015     Up to date on pneumococcal vaccine? yes      Recent Imaging Studies:    None since last visit      ASSESSMENT AND PLAN  • Pt has been feeling more fatigue likely d/t increased beta blocker.  • Pt is hypotensive in clinic today.  • Pt's renal fxn improved.    Resulting CHF medications today (changes are bolded)  • Entresto or ACE/ARB: Decrease lisinopril to 2.5 mg daily  • Beta blocker: Metoprolol  mg daily  • Diuretic:Stop furosemide 20 mg daily  • Aldosterone antagonist: Continue sprionolactone 25 mg daily    lipid medications (no changes)  • Continue atorvastatin 40 mg daily    Lifestyle Recommendations From Today's Visit:   • Limit fluid intake to 2L/day  • Limit Na+ intake to 2gm/day     Significant changes to laboratory values since last visit that require repeat labs:  • None    Blood Work Ordered At Today's visit:   None    Studies Ordered at Todays Visit:  None     Follow-Up:   2 weeks    Shadia Koch, PharmD      CC:  Pcp Pt States None  Tiffany Powers A.*    Medications reconciled  Flow sheets updated

## 2021-08-02 ENCOUNTER — OFFICE VISIT (OUTPATIENT)
Dept: CARDIOLOGY | Facility: MEDICAL CENTER | Age: 76
End: 2021-08-02
Payer: MEDICARE

## 2021-08-02 ENCOUNTER — PHARMACY VISIT (OUTPATIENT)
Dept: PHARMACY | Facility: MEDICAL CENTER | Age: 76
End: 2021-08-02
Payer: MEDICARE

## 2021-08-02 ENCOUNTER — TELEPHONE (OUTPATIENT)
Dept: CARDIOLOGY | Facility: MEDICAL CENTER | Age: 76
End: 2021-08-02

## 2021-08-02 VITALS
SYSTOLIC BLOOD PRESSURE: 84 MMHG | OXYGEN SATURATION: 94 % | WEIGHT: 168 LBS | DIASTOLIC BLOOD PRESSURE: 54 MMHG | HEART RATE: 99 BPM | HEIGHT: 70 IN | BODY MASS INDEX: 24.05 KG/M2

## 2021-08-02 DIAGNOSIS — Z95.5 S/P DRUG ELUTING CORONARY STENT PLACEMENT: ICD-10-CM

## 2021-08-02 DIAGNOSIS — Z79.01 CHRONIC ANTICOAGULATION: ICD-10-CM

## 2021-08-02 DIAGNOSIS — Z79.899 HIGH RISK MEDICATION USE: ICD-10-CM

## 2021-08-02 DIAGNOSIS — I50.20 ACC/AHA STAGE C SYSTOLIC HEART FAILURE (HCC): ICD-10-CM

## 2021-08-02 DIAGNOSIS — I25.10 CORONARY ARTERY DISEASE INVOLVING NATIVE CORONARY ARTERY OF NATIVE HEART WITHOUT ANGINA PECTORIS: ICD-10-CM

## 2021-08-02 DIAGNOSIS — E78.5 HYPERLIPIDEMIA, UNSPECIFIED HYPERLIPIDEMIA TYPE: ICD-10-CM

## 2021-08-02 DIAGNOSIS — I50.9 HEART FAILURE, NYHA CLASS 3 (HCC): ICD-10-CM

## 2021-08-02 DIAGNOSIS — I48.19 PERSISTENT ATRIAL FIBRILLATION (HCC): ICD-10-CM

## 2021-08-02 DIAGNOSIS — I35.0 SEVERE AORTIC STENOSIS: ICD-10-CM

## 2021-08-02 PROCEDURE — 99214 OFFICE O/P EST MOD 30 MIN: CPT | Performed by: NURSE PRACTITIONER

## 2021-08-02 PROCEDURE — RXMED WILLOW AMBULATORY MEDICATION CHARGE: Performed by: STUDENT IN AN ORGANIZED HEALTH CARE EDUCATION/TRAINING PROGRAM

## 2021-08-02 PROCEDURE — RXMED WILLOW AMBULATORY MEDICATION CHARGE: Performed by: NURSE PRACTITIONER

## 2021-08-02 RX ORDER — FUROSEMIDE 40 MG/1
TABLET ORAL
COMMUNITY
Start: 2021-07-28 | End: 2021-08-02

## 2021-08-02 ASSESSMENT — FIBROSIS 4 INDEX: FIB4 SCORE: 2.32

## 2021-08-02 NOTE — TELEPHONE ENCOUNTER
Patient is scheduled on 8-12-21 for a Cardioversion with anesthesia with Dr. Medeiros. Patient was told to hold spironolactone AM day of procedure and to check in at 11:00 for a 1:00 procedure. H&P was done on 8-2-21 by Tiffany Powers.Patient doesn't have a phone and was told to call pre admit to schedule appt.

## 2021-08-04 ASSESSMENT — ENCOUNTER SYMPTOMS
BRUISES/BLEEDS EASILY: 0
DIZZINESS: 0
COUGH: 0
BLOOD IN STOOL: 0
TINGLING: 0
FEVER: 0
FALLS: 0
MYALGIAS: 0
NAUSEA: 0
PND: 0
ABDOMINAL PAIN: 0
BLURRED VISION: 0
PALPITATIONS: 0
WEIGHT LOSS: 0
VOMITING: 0
LOSS OF CONSCIOUSNESS: 0
CLAUDICATION: 0
ORTHOPNEA: 0
SHORTNESS OF BREATH: 0

## 2021-08-05 ENCOUNTER — PRE-ADMISSION TESTING (OUTPATIENT)
Dept: ADMISSIONS | Facility: MEDICAL CENTER | Age: 76
End: 2021-08-05
Attending: INTERNAL MEDICINE
Payer: MEDICARE

## 2021-08-05 PROCEDURE — RXMED WILLOW AMBULATORY MEDICATION CHARGE: Performed by: NURSE PRACTITIONER

## 2021-08-05 ASSESSMENT — FIBROSIS 4 INDEX: FIB4 SCORE: 2.32

## 2021-08-07 PROCEDURE — RXMED WILLOW AMBULATORY MEDICATION CHARGE: Performed by: NURSE PRACTITIONER

## 2021-08-09 ENCOUNTER — PRE-ADMISSION TESTING (OUTPATIENT)
Dept: ADMISSIONS | Facility: MEDICAL CENTER | Age: 76
End: 2021-08-09
Attending: INTERNAL MEDICINE
Payer: MEDICARE

## 2021-08-09 ENCOUNTER — PHARMACY VISIT (OUTPATIENT)
Dept: PHARMACY | Facility: MEDICAL CENTER | Age: 76
End: 2021-08-09
Payer: MEDICARE

## 2021-08-09 DIAGNOSIS — Z01.812 PRE-OPERATIVE LABORATORY EXAMINATION: ICD-10-CM

## 2021-08-09 LAB
ANION GAP SERPL CALC-SCNC: 9 MMOL/L (ref 7–16)
BUN SERPL-MCNC: 24 MG/DL (ref 8–22)
CALCIUM SERPL-MCNC: 10 MG/DL (ref 8.5–10.5)
CHLORIDE SERPL-SCNC: 102 MMOL/L (ref 96–112)
CO2 SERPL-SCNC: 26 MMOL/L (ref 20–33)
CREAT SERPL-MCNC: 1.29 MG/DL (ref 0.5–1.4)
ERYTHROCYTE [DISTWIDTH] IN BLOOD BY AUTOMATED COUNT: 50.8 FL (ref 35.9–50)
GLUCOSE SERPL-MCNC: 116 MG/DL (ref 65–99)
HCT VFR BLD AUTO: 50.1 % (ref 42–52)
HGB BLD-MCNC: 16.7 G/DL (ref 14–18)
INR PPP: 1.29 (ref 0.87–1.13)
MCH RBC QN AUTO: 32.2 PG (ref 27–33)
MCHC RBC AUTO-ENTMCNC: 33.3 G/DL (ref 33.7–35.3)
MCV RBC AUTO: 96.7 FL (ref 81.4–97.8)
PLATELET # BLD AUTO: 237 K/UL (ref 164–446)
PMV BLD AUTO: 10 FL (ref 9–12.9)
POTASSIUM SERPL-SCNC: 4.6 MMOL/L (ref 3.6–5.5)
PROTHROMBIN TIME: 15.7 SEC (ref 12–14.6)
RBC # BLD AUTO: 5.18 M/UL (ref 4.7–6.1)
SARS-COV-2 RNA RESP QL NAA+PROBE: NOTDETECTED
SODIUM SERPL-SCNC: 137 MMOL/L (ref 135–145)
SPECIMEN SOURCE: NORMAL
WBC # BLD AUTO: 5.8 K/UL (ref 4.8–10.8)

## 2021-08-09 PROCEDURE — U0005 INFEC AGEN DETEC AMPLI PROBE: HCPCS

## 2021-08-09 PROCEDURE — 85027 COMPLETE CBC AUTOMATED: CPT

## 2021-08-09 PROCEDURE — 80048 BASIC METABOLIC PNL TOTAL CA: CPT

## 2021-08-09 PROCEDURE — 85610 PROTHROMBIN TIME: CPT

## 2021-08-09 PROCEDURE — 36415 COLL VENOUS BLD VENIPUNCTURE: CPT

## 2021-08-09 PROCEDURE — C9803 HOPD COVID-19 SPEC COLLECT: HCPCS

## 2021-08-09 PROCEDURE — U0003 INFECTIOUS AGENT DETECTION BY NUCLEIC ACID (DNA OR RNA); SEVERE ACUTE RESPIRATORY SYNDROME CORONAVIRUS 2 (SARS-COV-2) (CORONAVIRUS DISEASE [COVID-19]), AMPLIFIED PROBE TECHNIQUE, MAKING USE OF HIGH THROUGHPUT TECHNOLOGIES AS DESCRIBED BY CMS-2020-01-R: HCPCS

## 2021-08-11 NOTE — OR NURSING
COVID-19 Pre-surgery screening:    Unable to complete screening due to the  being unable to give out information or take a message for Pt.

## 2021-08-12 ENCOUNTER — APPOINTMENT (OUTPATIENT)
Dept: CARDIOLOGY | Facility: MEDICAL CENTER | Age: 76
End: 2021-08-12
Attending: NURSE PRACTITIONER
Payer: MEDICARE

## 2021-08-12 ENCOUNTER — HOSPITAL ENCOUNTER (OUTPATIENT)
Facility: MEDICAL CENTER | Age: 76
End: 2021-08-12
Attending: INTERNAL MEDICINE | Admitting: INTERNAL MEDICINE
Payer: MEDICARE

## 2021-08-12 VITALS
DIASTOLIC BLOOD PRESSURE: 74 MMHG | BODY MASS INDEX: 23.45 KG/M2 | HEART RATE: 65 BPM | RESPIRATION RATE: 18 BRPM | OXYGEN SATURATION: 92 % | SYSTOLIC BLOOD PRESSURE: 106 MMHG | WEIGHT: 163.8 LBS | HEIGHT: 70 IN | TEMPERATURE: 98.6 F

## 2021-08-12 DIAGNOSIS — I35.0 SEVERE AORTIC STENOSIS: ICD-10-CM

## 2021-08-12 DIAGNOSIS — E78.5 HYPERLIPIDEMIA, UNSPECIFIED HYPERLIPIDEMIA TYPE: ICD-10-CM

## 2021-08-12 DIAGNOSIS — Z79.899 HIGH RISK MEDICATION USE: ICD-10-CM

## 2021-08-12 DIAGNOSIS — I48.19 PERSISTENT ATRIAL FIBRILLATION (HCC): ICD-10-CM

## 2021-08-12 DIAGNOSIS — Z79.01 CHRONIC ANTICOAGULATION: ICD-10-CM

## 2021-08-12 DIAGNOSIS — I48.0 PAROXYSMAL ATRIAL FIBRILLATION (HCC): ICD-10-CM

## 2021-08-12 DIAGNOSIS — I50.20 ACC/AHA STAGE C SYSTOLIC HEART FAILURE (HCC): ICD-10-CM

## 2021-08-12 DIAGNOSIS — I50.9 HEART FAILURE, NYHA CLASS 3 (HCC): ICD-10-CM

## 2021-08-12 DIAGNOSIS — I25.10 CORONARY ARTERY DISEASE INVOLVING NATIVE CORONARY ARTERY OF NATIVE HEART WITHOUT ANGINA PECTORIS: ICD-10-CM

## 2021-08-12 DIAGNOSIS — Z95.5 S/P DRUG ELUTING CORONARY STENT PLACEMENT: ICD-10-CM

## 2021-08-12 LAB
EKG IMPRESSION: NORMAL
EKG IMPRESSION: NORMAL

## 2021-08-12 PROCEDURE — 92960 CARDIOVERSION ELECTRIC EXT: CPT | Performed by: INTERNAL MEDICINE

## 2021-08-12 PROCEDURE — 92960 CARDIOVERSION ELECTRIC EXT: CPT

## 2021-08-12 PROCEDURE — 93010 ELECTROCARDIOGRAM REPORT: CPT | Mod: 59 | Performed by: INTERNAL MEDICINE

## 2021-08-12 PROCEDURE — 93005 ELECTROCARDIOGRAM TRACING: CPT | Performed by: INTERNAL MEDICINE

## 2021-08-12 PROCEDURE — 160002 HCHG RECOVERY MINUTES (STAT)

## 2021-08-12 RX ORDER — SODIUM CHLORIDE, SODIUM LACTATE, POTASSIUM CHLORIDE, CALCIUM CHLORIDE 600; 310; 30; 20 MG/100ML; MG/100ML; MG/100ML; MG/100ML
INJECTION, SOLUTION INTRAVENOUS CONTINUOUS
Status: DISCONTINUED | OUTPATIENT
Start: 2021-08-12 | End: 2021-08-12 | Stop reason: HOSPADM

## 2021-08-12 ASSESSMENT — PAIN DESCRIPTION - PAIN TYPE
TYPE: SURGICAL PAIN
TYPE: ACUTE PAIN
TYPE: SURGICAL PAIN
TYPE: ACUTE PAIN
TYPE: SURGICAL PAIN
TYPE: ACUTE PAIN

## 2021-08-12 ASSESSMENT — FIBROSIS 4 INDEX: FIB4 SCORE: 1.59

## 2021-08-12 NOTE — OR NURSING
Pt's VSS; denies N/V; denies  Pain. Awake and alert ready for discharge. Pt is waiting for his ride.

## 2021-08-12 NOTE — OR NURSING
1319 Patient arrived from cath Lab post cardioversion. Patient awake, denies pain, denies nausea. Report received from anesthesiologist and RN. Vital signs stable.   1337 post EKG monitor reveals a.fib notified Dr medeiros. Per Dr medeiros will call back.   1416 Ok to discharge patient per Dr Medeiros.   1420 Criteria met to transition patient to stage 2 recovery.   1431 Report given to Airam RAMOS all questions answered.  1436 Patient transported to room with all her personal belongings.

## 2021-08-12 NOTE — DISCHARGE INSTRUCTIONS
ACTIVITY: Rest and take it easy for the first 24 hours.  A responsible adult is recommended to remain with you during that time.  It is normal to feel sleepy.  We encourage you to not do anything that requires balance, judgment or coordination.    MILD FLU-LIKE SYMPTOMS ARE NORMAL. YOU MAY EXPERIENCE GENERALIZED MUSCLE ACHES, THROAT IRRITATION, HEADACHE AND/OR SOME NAUSEA.    FOR 24 HOURS DO NOT:  Drive, operate machinery or run household appliances.  Drink beer or alcoholic beverages.   Make important decisions or sign legal documents.    SPECIAL INSTRUCTIONS:   Electrical Cardioversion, Care After  This sheet gives you information about how to care for yourself after your procedure. Your health care provider may also give you more specific instructions. If you have problems or questions, contact your health care provider.  What can I expect after the procedure?  After the procedure, it is common to have:  · Some redness on the skin where the shocks were given.  Follow these instructions at home:    · Do not drive for 24 hours if you were given a medicine to help you relax (sedative).  · Take over-the-counter and prescription medicines only as told by your health care provider.  · Ask your health care provider how to check your pulse. Check it often.  · Rest for 48 hours after the procedure or as told by your health care provider.  · Avoid or limit your caffeine use as told by your health care provider.  Contact a health care provider if:  · You feel like your heart is beating too quickly or your pulse is not regular.  · You have a serious muscle cramp that does not go away.  Get help right away if:    · You have discomfort in your chest.  · You are dizzy or you feel faint.  · You have trouble breathing or you are short of breath.  · Your speech is slurred.  · You have trouble moving an arm or leg on one side of your body.  · Your fingers or toes turn cold or blue.  This information is not intended to replace  advice given to you by your health care provider. Make sure you discuss any questions you have with your health care provider.        DIET: To avoid nausea, slowly advance diet as tolerated, avoiding spicy or greasy foods for the first day.  Add more substantial food to your diet according to your physician's instructions. INCREASE FLUIDS AND FIBER TO AVOID CONSTIPATION.    FOLLOW-UP APPOINTMENT:  A follow-up appointment should be arranged with your doctor in 1 week [phone]; call to schedule.    You should CALL YOUR PHYSICIAN if you develop:  Fever greater than 101 degrees F.  Pain not relieved by medication, or persistent nausea or vomiting.  Excessive bleeding (blood soaking through dressing) or unexpected drainage from the wound.  Extreme redness or swelling around the incision site, drainage of pus or foul smelling drainage.  Inability to urinate or empty your bladder within 8 hours.  Problems with breathing or chest pain.    You should call 911 if you develop problems with breathing or chest pain.  If you are unable to contact your doctor or surgical center, you should go to the nearest emergency room or urgent care center.  Physician's telephone #: [phone]    If any questions arise, call your doctor.  If your doctor is not available, please feel free to call the Surgical Center at [phone]    . The Contact Center is open Monday through Friday 7AM to 5PM and may speak to a nurse at [phone], or toll free at [phone].     A registered nurse may call you a few days after your surgery to see how you are doing after your procedure.    MEDICATIONS: Resume taking daily medication.  Take prescribed pain medication with food.  If no medication is prescribed, you may take non-aspirin pain medication if needed.  PAIN MEDICATION CAN BE VERY CONSTIPATING.  Take a stool softener or laxative such as senokot, pericolace, or milk of magnesia if needed..    If your physician has prescribed pain  medication that includes Acetaminophen (Tylenol), do not take additional Acetaminophen (Tylenol) while taking the prescribed medication.    Depression / Suicide Risk    As you are discharged from this [facility] facility, it is important to learn how to keep safe from harming yourself.    Recognize the warning signs:  · Abrupt changes in personality, positive or negative- including increase in energy   · Giving away possessions  · Change in eating patterns- significant weight changes-  positive or negative  · Change in sleeping patterns- unable to sleep or sleeping all the time   · Unwillingness or inability to communicate  · Depression  · Unusual sadness, discouragement and loneliness  · Talk of wanting to die  · Neglect of personal appearance   · Rebelliousness- reckless behavior  · Withdrawal from people/activities they love  · Confusion- inability to concentrate     If you or a loved one observes any of these behaviors or has concerns about self-harm, here's what you can do:  · Talk about it- your feelings and reasons for harming yourself  · Remove any means that you might use to hurt yourself (examples: pills, rope, extension cords, firearm)  · Get professional help from the community (Mental Health, Substance Abuse, psychological counseling)  · Do not be alone:Call your Safe Contact- someone whom you trust who will be there for you.  · Call your local CRISIS HOTLINE 773-8310 or 574-487-9077  · Call your local Children's Mobile Crisis Response Team [place] (236) 885-2429 or www.SendMe  · Call the toll free National Suicide Prevention Hotlines   · National Suicide Prevention Lifeline 142-815-AJMD (3088)  · National Hope Line Network 800-SUICIDE (315-3175)

## 2021-08-12 NOTE — PROCEDURES
Electrical Cardioversion    Date/Time: 8/12/2021 2:18 PM  Performed by: Edilson Medeiros M.D.  Authorized by: Edilson Medeiros M.D.     Consent:     Consent obtained:  Verbal    Consent given by:  Patient    Risks discussed:  Induced arrhythmia    Alternatives discussed:  No treatment, rate-control medication and alternative treatment  Sedation:     Patient sedated: Yes      Sedation type:  Per anesthesia  Pre-procedure details:     Cardioversion basis:  Elective    Rhythm:  Atrial flutter    Electrode placement:  Anterior-posterior    Anticoagulation status:  Apixaban  Attempt one:     Cardioversion mode:  Synchronous    Waveform:  Biphasic    Shock (Joules):  200    Shock outcome:  Conversion to normal sinus rhythm  Post-procedure details:     Patient status:  Awake    Patient tolerance of procedure:  Tolerated well, no immediate complications  Comments:      In the PACU, patient converted back to atrial fibrillation.

## 2021-08-16 ENCOUNTER — TELEPHONE (OUTPATIENT)
Dept: CARDIOLOGY | Facility: MEDICAL CENTER | Age: 76
End: 2021-08-16

## 2021-08-16 NOTE — TELEPHONE ENCOUNTER
Left voice message at [facility] regarding OTILIA Powers referral to [facility]. Provided direct contact information and encouraged return call regarding such.

## 2021-08-19 ENCOUNTER — NON-PROVIDER VISIT (OUTPATIENT)
Dept: CARDIOLOGY | Facility: MEDICAL CENTER | Age: 76
End: 2021-08-19
Payer: MEDICARE

## 2021-08-19 VITALS
BODY MASS INDEX: 24.39 KG/M2 | WEIGHT: 170 LBS | SYSTOLIC BLOOD PRESSURE: 100 MMHG | HEART RATE: 76 BPM | DIASTOLIC BLOOD PRESSURE: 68 MMHG

## 2021-08-19 PROCEDURE — 99211 OFF/OP EST MAY X REQ PHY/QHP: CPT | Performed by: INTERNAL MEDICINE

## 2021-08-19 ASSESSMENT — FIBROSIS 4 INDEX: FIB4 SCORE: 1.59

## 2021-08-19 NOTE — NON-PROVIDER
CHF Pharmacotherapy visit - Visit    Date of Service: 08/19/21    Informed written consent was given on: 7/29/21    Rajesh De Luna is here for CHF and lipids    HPI  Pertinent Interval History since last visit:   • Pt presents to clinic stating that he has been experiencing more urinary frequency - this may be d/t spironolactone use.  • At pt's last cardiology appt, his diuretic and ACEi were held.    Most recent EF:  35% (6/2021)      Current Outpatient Medications:   •  metoprolol SR, 100 mg, Oral, DAILY  •  spironolactone, 25 mg, Oral, DAILY  •  apixaban, 5 mg, Oral, BID  •  atorvastatin, 40 mg, Oral, DAILY  •  clopidogrel, 75 mg, Oral, DAILY  •  amiodarone, 200 mg, Oral, DAILY    Current Adherence to CHF Therapies:  Complete    Current CHF Medications - including dose:   • Entresto or ACE/ARB: N/a  • Beta blocker: Metoprolol 100 mg once daily  • Diuretic: N/a  • Aldosterone antagonist: Spironolactone 25 mg once daily    Current lipid medications:  • Atorvastatin 40 mg once daily  • All lipid markers are at goal  o    Ref. Range 6/13/2021 02:35   Cholesterol,Tot Latest Ref Range: 100 - 199 mg/dL 108   Triglycerides Latest Ref Range: 0 - 149 mg/dL 75   HDL Latest Ref Range: >=40 mg/dL 40   LDL Latest Ref Range: <100 mg/dL 53       Vitals:    08/19/21 1053   BP: 100/68   Pulse: 76       Home BP and HR:  85/50 - pt states consistently low. Does not monitor pulse    Change in weight: Stable    Exercise habits: moderate regular exercise program - pt walks ~ 1 mile a day    Diet: Coffee and Slovenian in the morning. Pt states he doesn't have much of an appetite. Will eat once more after this typically. Drinks 1 beer every other day.    SOCIAL HISTORY  Social History     Tobacco Use   Smoking Status Current Some Day Smoker   • Types: Cigars   Smokeless Tobacco Never Used   Tobacco Comment    reports Cigars, 3 per day        DATA REVIEW  No results found for: HBA1C       Lab Results   Component Value Date/Time     CHOLSTRLTOT 108 06/13/2021 02:35 AM    LDL 53 06/13/2021 02:35 AM    HDL 40 06/13/2021 02:35 AM    TRIGLYCERIDE 75 06/13/2021 02:35 AM       Lab Results   Component Value Date/Time    SODIUM 137 08/09/2021 09:51 AM    POTASSIUM 4.6 08/09/2021 09:51 AM    CHLORIDE 102 08/09/2021 09:51 AM    CO2 26 08/09/2021 09:51 AM    GLUCOSE 116 (H) 08/09/2021 09:51 AM    BUN 24 (H) 08/09/2021 09:51 AM    CREATININE 1.29 08/09/2021 09:51 AM     Lab Results   Component Value Date/Time    ALKPHOSPHAT 132 (H) 06/11/2021 01:19 AM    ASTSGOT 21 06/11/2021 01:19 AM    ALTSGPT 19 06/11/2021 01:19 AM    TBILIRUBIN 1.0 06/11/2021 01:19 AM    INR 1.29 (H) 08/09/2021 09:51 AM    ALBUMIN 3.6 06/11/2021 01:19 AM      No components found for: MICROALBUMINCREATRATIOURINE    Renal function:  Calculated creatinine clearance: ~49 ml/min     Other:  Immunization History   Administered Date(s) Administered   • Pneumococcal polysaccharide vaccine (PPSV-23) 09/23/2015     Up to date on pneumococcal vaccine? Yes      Recent Imaging Studies:    None since last visit      ASSESSMENT AND PLAN  • Pt has been consistently hypotensive - HR was at goal today x 1 reading. He denies s/sx of this. Given his urinary frequency, discussed decreasing diuretic dose to see if this alleviates the issue.   o May consider re-challenge of ACEi therapy if this does alleviate urinary sx and BP increases    Resulting CHF medications today (changes are bolded)  • Entresto or ACE/ARB: N/a  • Beta blocker: Metoprolol 100 mg once daily  • Aldosterone antagonist: Decrease spironolactone to 12.5 mg once daily    lipid medications (no changes)  • Atorvastatin 40 mg once daily    Lifestyle Recommendations From Today's Visit:   • Continue to eat DASH/MED style diet.   • Continue to exercise as tolerated.     Significant changes to laboratory values since last visit that require repeat labs:  • Pts renal function is poor - will CTM. All medications dosed appropriately at this  time.    Blood Work Ordered At Today's visit:   None    Studies Ordered at Todays Visit:  None     Follow-Up:   2 weeks    Greg Mehta PharmD      CC:  Pcp Pt States None  No ref. provider found    xMedications reconciled  xFlow sheets updated

## 2021-08-20 PROCEDURE — RXMED WILLOW AMBULATORY MEDICATION CHARGE: Performed by: STUDENT IN AN ORGANIZED HEALTH CARE EDUCATION/TRAINING PROGRAM

## 2021-09-02 ENCOUNTER — PHARMACY VISIT (OUTPATIENT)
Dept: PHARMACY | Facility: MEDICAL CENTER | Age: 76
End: 2021-09-02
Payer: MEDICARE

## 2021-09-02 ENCOUNTER — NON-PROVIDER VISIT (OUTPATIENT)
Dept: CARDIOLOGY | Facility: MEDICAL CENTER | Age: 76
End: 2021-09-02
Payer: MEDICARE

## 2021-09-02 ENCOUNTER — OFFICE VISIT (OUTPATIENT)
Dept: CARDIOLOGY | Facility: MEDICAL CENTER | Age: 76
End: 2021-09-02
Payer: MEDICARE

## 2021-09-02 ENCOUNTER — TELEPHONE (OUTPATIENT)
Dept: CARDIOLOGY | Facility: MEDICAL CENTER | Age: 76
End: 2021-09-02

## 2021-09-02 VITALS
RESPIRATION RATE: 14 BRPM | DIASTOLIC BLOOD PRESSURE: 50 MMHG | HEART RATE: 75 BPM | WEIGHT: 173 LBS | OXYGEN SATURATION: 97 % | BODY MASS INDEX: 24.77 KG/M2 | SYSTOLIC BLOOD PRESSURE: 104 MMHG | HEIGHT: 70 IN

## 2021-09-02 DIAGNOSIS — I50.20 ACC/AHA STAGE C SYSTOLIC HEART FAILURE (HCC): ICD-10-CM

## 2021-09-02 DIAGNOSIS — I35.0 SEVERE AORTIC STENOSIS: ICD-10-CM

## 2021-09-02 DIAGNOSIS — Z79.01 CHRONIC ANTICOAGULATION: ICD-10-CM

## 2021-09-02 DIAGNOSIS — R06.02 SHORTNESS OF BREATH: ICD-10-CM

## 2021-09-02 DIAGNOSIS — I50.9 HEART FAILURE, NYHA CLASS 3 (HCC): ICD-10-CM

## 2021-09-02 DIAGNOSIS — Z95.5 S/P DRUG ELUTING CORONARY STENT PLACEMENT: ICD-10-CM

## 2021-09-02 DIAGNOSIS — Z01.810 PRE-OPERATIVE CARDIOVASCULAR EXAMINATION: ICD-10-CM

## 2021-09-02 DIAGNOSIS — I48.19 PERSISTENT ATRIAL FIBRILLATION (HCC): ICD-10-CM

## 2021-09-02 DIAGNOSIS — I25.10 CORONARY ARTERY DISEASE INVOLVING NATIVE CORONARY ARTERY OF NATIVE HEART WITHOUT ANGINA PECTORIS: ICD-10-CM

## 2021-09-02 PROCEDURE — RXMED WILLOW AMBULATORY MEDICATION CHARGE: Performed by: NURSE PRACTITIONER

## 2021-09-02 PROCEDURE — 99214 OFFICE O/P EST MOD 30 MIN: CPT | Performed by: NURSE PRACTITIONER

## 2021-09-02 PROCEDURE — 99999 PR NO CHARGE: CPT | Performed by: INTERNAL MEDICINE

## 2021-09-02 RX ORDER — SPIRONOLACTONE 25 MG/1
25 TABLET ORAL DAILY
Qty: 100 TABLET | Refills: 2 | Status: SHIPPED | OUTPATIENT
Start: 2021-09-02 | End: 2021-12-02 | Stop reason: SDUPTHER

## 2021-09-02 RX ORDER — AMIODARONE HYDROCHLORIDE 200 MG/1
200 TABLET ORAL DAILY
Qty: 100 TABLET | Refills: 2 | Status: SHIPPED | OUTPATIENT
Start: 2021-09-02 | End: 2021-12-02 | Stop reason: SDUPTHER

## 2021-09-02 RX ORDER — FUROSEMIDE 20 MG/1
20 TABLET ORAL
Qty: 30 TABLET | Refills: 11 | Status: SHIPPED | OUTPATIENT
Start: 2021-09-02 | End: 2022-10-07 | Stop reason: SDUPTHER

## 2021-09-02 RX ORDER — METOPROLOL SUCCINATE 50 MG/1
150 TABLET, EXTENDED RELEASE ORAL DAILY
Qty: 180 TABLET | Refills: 5 | Status: SHIPPED | OUTPATIENT
Start: 2021-09-02 | End: 2021-12-02 | Stop reason: SDUPTHER

## 2021-09-02 RX ORDER — CLOPIDOGREL BISULFATE 75 MG/1
75 TABLET ORAL DAILY
Qty: 90 TABLET | Refills: 2 | Status: SHIPPED | OUTPATIENT
Start: 2021-09-02 | End: 2021-11-18 | Stop reason: SDUPTHER

## 2021-09-02 ASSESSMENT — ENCOUNTER SYMPTOMS
TINGLING: 0
BRUISES/BLEEDS EASILY: 0
VOMITING: 0
ABDOMINAL PAIN: 0
PALPITATIONS: 0
PND: 0
BLOOD IN STOOL: 0
BLURRED VISION: 0
FALLS: 0
DIZZINESS: 0
LOSS OF CONSCIOUSNESS: 0
ORTHOPNEA: 0
WEIGHT LOSS: 0
SHORTNESS OF BREATH: 0
CLAUDICATION: 0
FEVER: 0
COUGH: 0
MYALGIAS: 0
NAUSEA: 0

## 2021-09-02 ASSESSMENT — FIBROSIS 4 INDEX: FIB4 SCORE: 1.59

## 2021-09-02 NOTE — TELEPHONE ENCOUNTER
Met with patient as HF follow up visit with OTILIA oPwers. Reviewed [facility] referral and all appointments associated with such including IC consult, CTS consult, CTA, and carotid duplex. Provided printed information with details for all such apts including date, time, location for all. Also provided patient with direct contact information for this RN and encouraged patient to reach out if any questions or concerns arise in the interim of consult and testing. Patient agrees with the plan, states no questions a this time and very thankful for time spent reviewing appointments today.     [facility]- ROXANNE 9/22 slot C consults and testing

## 2021-09-02 NOTE — PROGRESS NOTES
Chief Complaint   Patient presents with   • Atrial Fibrillation   • Supraventricular Tachycardia (SVT)   • Congestive Heart Failure     F/V Dx: Heart failure, NYHA class 3 (HCC)       Subjective:   Rajesh De Luna is a 75 y.o. male who presents today for A. Fib and heart failure follow-up after recent admission on 6/10/2021 for A. fib with RVR and new heart failure after he was seen by myself in the office.  Patient was cardioverted on 6/12/2021.  He was also sent for coronary angiogram was found to have coronary artery disease s/p PCI/DONOVAN to left circumflex.  Patient did return to A. fib but declined second cardioversion.  Patient was last seen by Linda Scott on 7/2/2021.  Patient was also seen by Dr. Ruiz and pharmacotherapy clinic.    Patient's past medical history of A. fib, hypertension, and alcohol use.    Today, patient reports his weight has increased approximately 3 pounds. Patient relates this to recently eating pizza and not following the low-sodium diet. Patient also notes he ran out of his medications 2 days ago; this will be refilled in office today. Patient denies chest pain, shortness of breath, palpitations, dizziness/lightheadedness, orthopnea, PND or Edema. We discussed the importance of low-sodium diet diet we will also provide as needed Lasix for weight gain. Patient denies YING with activity. Patient reports medication compliance. We discussed that he was not cardioverted by Dr. Medeiros due to returning to sinus rhythm prior to DCCV. However, prior to discharge patient converted back to A. fib; patient unaware of his return to A. Fib.    Communication barriers also regarding scheduling with valve clinic. Valve clinic coordinator to come to exam room to discuss scheduling before patient leaves clinic today.    Based on physical examination findings, patient is euvolemic. No JVD, lungs are clear to auscultation, no pitting edema in bilateral lower extremities, no ascites. Dry weight is 170 lbs.  weight in office today noted to be slightly elevated 173 pounds. Patient also noted to be tachycardic exam. GDMT adjusted per below.    Patient notes he does not have a personal phone.  We have been previously unsuccessful in reaching him to schedule cardioversion, patient needs voicemail to be left at the [facility] and he will return our call.  Contact information updated at checkout.    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 No pertinent family history.  Social History     Socioeconomic History   • Marital status: Single     Spouse name: Not on file   • Number of children: Not on file   • Years of education: Not on file   • Highest education level: Not on file   Occupational History   • Not on file   Tobacco Use   • Smoking status: Current Some Day Smoker     Types: Cigars   • Smokeless tobacco: Never Used   • Tobacco comment: reports Cigars, 3 per day   Vaping Use   • Vaping Use: Never used   Substance and Sexual Activity   • Alcohol use: Yes     Comment: reports 2/day   • Drug use: Not Currently   • Sexual activity: Not on file   Other Topics Concern   • Not on file   Social History Narrative   • Not on file     Social Determinants of Health     Financial Resource Strain:    • Difficulty of Paying Living Expenses:    Food Insecurity:    • Worried About Running Out of Food in the Last Year:    • Ran Out of Food in the Last Year:    Transportation Needs:    • Lack of Transportation (Medical):    • Lack of Transportation (Non-Medical):    Physical Activity:    •  Days of Exercise per Week:    • Minutes of Exercise per Session:    Stress:    • Feeling of Stress :    Social Connections:    • Frequency of Communication with Friends and Family:    • Frequency of Social Gatherings with Friends and Family:    • Attends [employer] Services:    • Active Member of Clubs or Organizations:    • Attends Club or Organization Meetings:    • Marital Status:    Intimate Partner Violence:    • Fear of Current or Ex-Partner:    • Emotionally Abused:    • Physically Abused:    • Sexually Abused:      Allergies   Allergen Reactions   • Penicillins Unspecified            Outpatient Encounter Medications as of 9/2/2021   Medication Sig Dispense Refill   • amiodarone (CORDARONE) 200 MG Tab Take 1 Tablet by mouth every day. 100 Tablet 2   • clopidogrel (PLAVIX) 75 MG Tab Take 1 tablet by mouth every day. 90 Tablet 2   • spironolactone (ALDACTONE) 25 MG Tab Take 1 Tablet by mouth every day. 100 Tablet 2   • metoprolol SR (TOPROL XL) 50 MG TABLET SR 24 HR Take 3 Tablets by mouth every day. 180 Tablet 5   • furosemide (LASIX) 20 MG Tab Take 1 Tablet by mouth 1 time a day as needed. as needed for weight gain greater than 3 pounds in 1 day or 5 pounds in 1 week. 30 Tablet 11   • apixaban (ELIQUIS) 5mg Tab Take 1 tablet by mouth 2 times a day. 60 tablet 11   • atorvastatin (LIPITOR) 40 MG Tab Take 1 tablet by mouth every day. 90 tablet 1   • [DISCONTINUED] metoprolol SR (TOPROL XL) 100 MG TABLET SR 24 HR Take 1 tablet by mouth every day. 30 tablet 11   • [DISCONTINUED] spironolactone (ALDACTONE) 25 MG Tab Take 1 tablet by mouth every day. 30 tablet 11   • clopidogrel (PLAVIX) 75 MG Tab Take 1 tablet by mouth every day. 90 tablet 1   • [DISCONTINUED] amiodarone (CORDARONE) 200 MG Tab Take 1 tablet by mouth every day. 30 tablet 11     No facility-administered encounter medications on file as of 9/2/2021.     Review of Systems   Constitutional: Negative for fever, malaise/fatigue and weight loss.   Eyes:  "Negative for blurred vision.   Respiratory: Negative for cough and shortness of breath.    Cardiovascular: Negative for chest pain, palpitations, orthopnea, claudication, leg swelling and PND.   Gastrointestinal: Negative for abdominal pain, blood in stool, nausea and vomiting.   Genitourinary: Negative for dysuria, frequency and hematuria.   Musculoskeletal: Negative for falls and myalgias.   Neurological: Negative for dizziness, tingling and loss of consciousness.   Endo/Heme/Allergies: Does not bruise/bleed easily.        Objective:   /50 (BP Location: Left arm, Patient Position: Sitting, BP Cuff Size: Adult)   Pulse 75   Resp 14   Ht 1.778 m (5' 10\")   Wt 78.5 kg (173 lb)   SpO2 97%   BMI 24.82 kg/m²     Physical Exam   Constitutional: He is oriented to person, place, and time. He appears well-developed. No distress.   HENT:   Head: Normocephalic and atraumatic.   Eyes: Pupils are equal, round, and reactive to light.   Neck: No JVD present.   Cardiovascular: Normal rate, regular rhythm and normal heart sounds. Exam reveals no gallop and no friction rub.   No murmur heard.  Pulmonary/Chest: Effort normal and breath sounds normal. No respiratory distress.   Abdominal: Soft. Bowel sounds are normal. He exhibits no distension.   Musculoskeletal:      Right lower leg: No edema.      Left lower leg: No edema.   Neurological: He is alert and oriented to person, place, and time.   Skin: Skin is warm and dry. No erythema.   Psychiatric: His behavior is normal. Mood normal.   Vitals reviewed.    Lab Results   Component Value Date/Time    CHOLSTRLTOT 108 06/13/2021 02:35 AM    LDL 53 06/13/2021 02:35 AM    HDL 40 06/13/2021 02:35 AM    TRIGLYCERIDE 75 06/13/2021 02:35 AM       Lab Results   Component Value Date/Time    SODIUM 137 08/09/2021 09:51 AM    POTASSIUM 4.6 08/09/2021 09:51 AM    CHLORIDE 102 08/09/2021 09:51 AM    CO2 26 08/09/2021 09:51 AM    GLUCOSE 116 (H) 08/09/2021 09:51 AM    BUN 24 (H) " 08/09/2021 09:51 AM    CREATININE 1.29 08/09/2021 09:51 AM     Lab Results   Component Value Date/Time    ALKPHOSPHAT 132 (H) 06/11/2021 01:19 AM    ASTSGOT 21 06/11/2021 01:19 AM    ALTSGPT 19 06/11/2021 01:19 AM    TBILIRUBIN 1.0 06/11/2021 01:19 AM       Ref. Range 6/3/2021 01:16   TSH Latest Ref Range: 0.380 - 5.330 uIU/mL 3.340      Ref. Range 6/2/2021 01:16   NT-proBNP Latest Ref Range: 0 - 125 pg/mL 4828 (H)     Echo (6/2/2021): Severely reduced left ventricular systolic function.  Global hypokinesis.  Reduced right ventricular systolic function.  Severely dilated left atrium - prominence of the posterior LA wall-   possible thrombus/mass lesion- consider further evaluation with PORTILLO or   CT/MRI. Mild mitral regurgitation.  Transesophageal Echo Report 6/11/2021  1. Normal left atrial appendage. No thrombus detected in the left   atrial appendage.      2. The aortic valve is heavily calcified.  There is partial fusion of   all three leaflets.  There is severe aortic stenosis versus   pseudostenois.      3. Severely reduced left ventricular systolic function.  Left   ventricular ejection fraction is visually estimated to be 15%.     Coronary angiogram 6/12/2021  PREOPERATIVE DIAGNOSIS:  1.  Acute decompensated heart failure with reduced ejection fraction  2.  History of alcohol abuse  3.  Non-STEMI  4.  A. fib RVR     POSTOPERATIVE DIAGNOSIS:  1.  99% culprit LCx stenosis  2.  Calcific 50 to 60% proximal LAD stenosis and moderate nonobstructive RCA disease  3.  LVEDP 16 mmHg  4.  Successful PCI culprit LCx with [place] DONOVAN     PROCEDURE PERFORMED:  Selective coronary angiography of the native vessels  Left heart catheterization  Left ventriculogram  PCI of LCx DONOVAN  Supervision moderate sedation    Assessment:     1. Heart failure, NYHA class 3 (HCC)  metoprolol SR (TOPROL XL) 50 MG TABLET SR 24 HR    EC-ECHOCARDIOGRAM COMPLETE W/O CONT   2. Severe aortic stenosis  EC-ECHOCARDIOGRAM COMPLETE W/O CONT   3. Persistent  atrial fibrillation (HCC)  EC-ECHOCARDIOGRAM COMPLETE W/O CONT   4. ACC/AHA stage C systolic heart failure (HCC)  EC-ECHOCARDIOGRAM COMPLETE W/O CONT   5. S/P drug eluting coronary stent placement  EC-ECHOCARDIOGRAM COMPLETE W/O CONT   6. Chronic anticoagulation  EC-ECHOCARDIOGRAM COMPLETE W/O CONT   7. Coronary artery disease involving native coronary artery of native heart without angina pectoris  EC-ECHOCARDIOGRAM COMPLETE W/O CONT       Medical Decision Making:  Today's Assessment / Status / Plan:   Proximal atrial fibrillation  -Recently discharged on 6/5/2021 and 6/10/2021 for A. Fib.  -[id number] Score 4  -Continue Eliquis 5 mg twice daily  -Continue amiodarone 200 mg daily.  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  -Increase metoprolol  mg daily  -Continue Spironolactone 25 mg daily  -Continue Furosemide to 20 mg daily as needed for weight gain greater than 3 pounds in 1 day or 5 pounds in 1 week.  -Repeat Echo in 1 month, if LVEF not >35%, then will discuss/consider ICD for primary Prevention.   -Reinforced s/sx of worsening heart failure with patient and weight monitoring. Pt verbalizes understanding. Pt to call office if present.    -Heart Failure Education: pt to be contacted by HF nurse for further education,   -We will refer to pharmacotherapy clinic for heart failure GDMT titration assistance    CAD s/p PCI/DONOVAN to left circumflex on 6/12/2021; hyperlipidemia  -Recent LDL 53 6/13/2021. At goal  -No triple therapy due to high bleeding risk  -Continue Plavix 75 mg daily  -Continue Eliquis  -Continue atorvastatin 40 mg daily  -Beta-blocker per above    Alcohol use; cigar use  -Patient reports continued alcohol cessation.  Patient reports he does continue  cigar use every evening.    Severe AS versus pseudostenosis; Dilated aortic root at 3.6 cm on 6/1/2021  -Continue to monitor follow-up echo  -Referred to structural heart clinic for continued monitoring. Patient to schedule appointments today in office.    FU in clinic in 3 month. Sooner if needed.    Patient verbalizes understanding and agrees with the plan of care.     Collaborating MD: Dr. Luiz MD    PLEASE NOTE: This Note was created using voice recognition Software. I have made every reasonable attempt to correct obvious errors, but I expect that there are errors of grammar and possibly content that I did not discover before finalizing the note

## 2021-09-02 NOTE — NON-PROVIDER
CHF Pharmacotherapy visit - Visit    Date of Service: 09/02/21    Informed written consent was given on: 7/29/21    Rajesh De Luna is here for CHF and lipids    HPI  Pertinent Interval History since last visit:   • Pt presents from f/u visit w/ cardiology APRN where his metoprolol was increased to 150 mg daily and he was also prescribed furosemide 20 mg PRN.  • Pt states he was able to decrease his spironolactone to 12.5 mg once daily, but he states that his urinary frequency has not improved. Given this, recommended pt return to original dosing.    Most recent EF:  35% (6/2021)      Current Outpatient Medications:   •  amiodarone, 200 mg, Oral, DAILY  •  clopidogrel, 75 mg, Oral, DAILY  •  spironolactone, 25 mg, Oral, DAILY  •  metoprolol SR, 150 mg, Oral, DAILY  •  furosemide, 20 mg, Oral, QDAY PRN  •  apixaban, 5 mg, Oral, BID  •  atorvastatin, 40 mg, Oral, DAILY    Current Adherence to CHF Therapies:  Complete    Current CHF Medications - including dose:   • Entresto or ACE/ARB: N/a  • Beta blocker: Metoprolol 100 mg once daily  • Diuretic: Furosemide 20 mg PRN  • Aldosterone antagonist: Spironolactone 12.5 mg once daily    Current lipid medications:  • Atorvastatin 40 mg once daily  o All lipid markers at goal    There were no vitals filed for this visit.    Home BP and HR:  Avg ~ 100/50's, ~75 typically    Change in weight: Stable    Exercise habits: moderate regular exercise program - pt walks ~ 1 mile a day     Diet: Eating more pizza lately - likely contributing to some fluid retention. Coffee and Moroccan in the morning. Pt states he doesn't have much of an appetite. Will eat once more after this typically. Drinks 1 beer every other day.    SOCIAL HISTORY  Social History     Tobacco Use   Smoking Status Current Some Day Smoker   • Types: Cigars   Smokeless Tobacco Never Used   Tobacco Comment    reports Cigars, 3 per day        DATA REVIEW  No results found for: HBA1C       Lab Results   Component  Value Date/Time    CHOLSTRLTOT 108 06/13/2021 02:35 AM    LDL 53 06/13/2021 02:35 AM    HDL 40 06/13/2021 02:35 AM    TRIGLYCERIDE 75 06/13/2021 02:35 AM       Lab Results   Component Value Date/Time    SODIUM 137 08/09/2021 09:51 AM    POTASSIUM 4.6 08/09/2021 09:51 AM    CHLORIDE 102 08/09/2021 09:51 AM    CO2 26 08/09/2021 09:51 AM    GLUCOSE 116 (H) 08/09/2021 09:51 AM    BUN 24 (H) 08/09/2021 09:51 AM    CREATININE 1.29 08/09/2021 09:51 AM     Lab Results   Component Value Date/Time    ALKPHOSPHAT 132 (H) 06/11/2021 01:19 AM    ASTSGOT 21 06/11/2021 01:19 AM    ALTSGPT 19 06/11/2021 01:19 AM    TBILIRUBIN 1.0 06/11/2021 01:19 AM    INR 1.29 (H) 08/09/2021 09:51 AM    ALBUMIN 3.6 06/11/2021 01:19 AM      No components found for: MICROALBUMINCREATRATIOURINE    Renal function:  Calculated creatinine clearance: ~ 49 ml/min     Other:  Immunization History   Administered Date(s) Administered   • Pneumococcal polysaccharide vaccine (PPSV-23) 09/23/2015     Up to date on pneumococcal vaccine? Yes      Recent Imaging Studies:    None since last visit      ASSESSMENT AND PLAN  • Since pt's last visit, he is doing well overall. His BP is controlled, but his pulse is mildly elevated - OTILIA Larson increased his metoprolol to 150 mg once daily prior to this appt.  • Counseled pt regarding his medications and indications.  • Given spironolactone decreased dose trial did not alleviate urinary sx, counseled pt to increase back to previous dosage    Resulting CHF medications today (changes are bolded)  • Entresto or ACE/ARB: N/a  • Beta blocker: Metoprolol 150 mg once daily  • Diuretic: Furosemide 20 mg PRN  • Aldosterone antagonist: Spironolactone 25 mg once daily    lipid medications (no changes)  • Atorvastatin 40 mg once daily    Lifestyle Recommendations From Today's Visit:   • Continue to eat DASH/MED style diet. Focus on decreasing Na intake.  • Continue to exercise as tolerated.     Significant changes to  laboratory values since last visit that require repeat labs:  • N/a    Blood Work Ordered At Today's visit:   None     Studies Ordered at Todays Visit:  None     Follow-Up:   2 weeks    Greg Mehta PharmD      CC:  Pcp Pt States None  No ref. provider found    xMedications reconciled  xFlow sheets updated

## 2021-09-02 NOTE — PROGRESS NOTES
REFERRING PHYSICIAN: Eemka Herzog MD.     CONSULTING PHYSICIAN: Raymundo Paul DO.    CHIEF COMPLAINT: Shortness of breath with exertion, fatigue, chest pain.    HISTORY OF PRESENT ILLNESS: The patient is a 76 y.o. male with history significant for atrial fibrillation, on Eliquis (s/p failed cardioversion 6/12/2021), CAD s/p PCI with DONOVAN to left circumflex (Plavix), hyperlipidemia, hypertension, alcohol use, cardiomyopathy with EF 15% and recent echocardiogram showing severe aortic stenosis vs pseudostenosis.  He states that he has shortness of breath with exertion recently as well as fatigue.  He has some chest tightness in the mornings.  He states he just gets up slower.  He recently has trouble sleeping, mainly due to nocturia with his diuretics.  He denies syncope, near syncope, dizziness or increased lower extremity edema.     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  No family history significant to today's visit.     SOCIAL HISTORY:   Social History     Socioeconomic History   • Marital status: Single     Spouse name: Not on file   • Number of children: Not on file   • Years of education: Not on file   • Highest education level: Not on file   Occupational History   • Not on file   Tobacco Use   • Smoking status: Current Some Day Smoker     Types: Cigars   • Smokeless tobacco: Never Used   • Tobacco comment: reports Cigars, 3 per day   Vaping Use   •  Vaping Use: Never used   Substance and Sexual Activity   • Alcohol use: Yes     Comment: reports 2/day   • Drug use: Not Currently   • Sexual activity: Not on file   Other Topics Concern   • Not on file   Social History Narrative   • Not on file     Social Determinants of Health     Financial Resource Strain:    • Difficulty of Paying Living Expenses:    Food Insecurity:    • Worried About Running Out of Food in the Last Year:    • Ran Out of Food in the Last Year:    Transportation Needs:    • Lack of Transportation (Medical):    • Lack of Transportation (Non-Medical):    Physical Activity:    • Days of Exercise per Week:    • Minutes of Exercise per Session:    Stress:    • Feeling of Stress :    Social Connections:    • Frequency of Communication with Friends and Family:    • Frequency of Social Gatherings with Friends and Family:    • Attends [employer] Services:    • Active Member of Clubs or Organizations:    • Attends Club or Organization Meetings:    • Marital Status:    Intimate Partner Violence:    • Fear of Current or Ex-Partner:    • Emotionally Abused:    • Physically Abused:    • Sexually Abused:        ALLERGIES:   Allergies   Allergen Reactions   • Penicillins Unspecified               CURRENT MEDICATIONS:     Current Outpatient Medications:   •  amiodarone (CORDARONE) 200 MG Tab, Take 1 Tablet by mouth every day., Disp: 100 Tablet, Rfl: 2  •  clopidogrel (PLAVIX) 75 MG Tab, Take 1 tablet by mouth every day., Disp: 90 Tablet, Rfl: 2  •  spironolactone (ALDACTONE) 25 MG Tab, Take 1 Tablet by mouth every day., Disp: 100 Tablet, Rfl: 2  •  metoprolol SR (TOPROL XL) 50 MG TABLET SR 24 HR, Take 3 Tablets by mouth every day., Disp: 180 Tablet, Rfl: 5  •  furosemide (LASIX) 20 MG Tab, Take 1 Tablet by mouth 1 time a day as needed. as needed for weight gain greater than 3 pounds in 1 day or 5 pounds in 1 week., Disp: 30 Tablet, Rfl: 11  •  apixaban (ELIQUIS) 5mg Tab, Take 1 tablet by mouth 2 times a day.,  Disp: 60 tablet, Rfl: 11  •  atorvastatin (LIPITOR) 40 MG Tab, Take 1 tablet by mouth every day., Disp: 90 tablet, Rfl: 1     LABS REVIEWED:  Lab Results   Component Value Date/Time    SODIUM 137 08/09/2021 09:51 AM    POTASSIUM 4.6 08/09/2021 09:51 AM    CHLORIDE 102 08/09/2021 09:51 AM    CO2 26 08/09/2021 09:51 AM    GLUCOSE 116 (H) 08/09/2021 09:51 AM    BUN 24 (H) 08/09/2021 09:51 AM    CREATININE 1.29 08/09/2021 09:51 AM      Lab Results   Component Value Date/Time    PROTHROMBTM 15.7 (H) 08/09/2021 09:51 AM    INR 1.29 (H) 08/09/2021 09:51 AM      Lab Results   Component Value Date/Time    WBC 5.8 08/09/2021 09:51 AM    RBC 5.18 08/09/2021 09:51 AM    HEMOGLOBIN 16.7 08/09/2021 09:51 AM    HEMATOCRIT 50.1 08/09/2021 09:51 AM    MCV 96.7 08/09/2021 09:51 AM    MCH 32.2 08/09/2021 09:51 AM    MCHC 33.3 (L) 08/09/2021 09:51 AM    RDW 50.8 (H) 08/09/2021 09:51 AM    PLATELETCT 237 08/09/2021 09:51 AM    MPV 10.0 08/09/2021 09:51 AM    NEUTSPOLYS 69.30 06/10/2021 09:57 AM    LYMPHOCYTES 18.40 (L) 06/10/2021 09:57 AM    MONOCYTES 8.80 06/10/2021 09:57 AM    EOSINOPHILS 2.30 06/10/2021 09:57 AM    BASOPHILS 0.80 06/10/2021 09:57 AM        IMAGING REVIEWED AND INTERPRETED:    ECHOCARDIOGRAM: 6/2/2021  Severely reduced left ventricular systolic function.  Global hypokinesis.  Reduced right ventricular systolic function.  Severely dilated left atrium - prominence of the posterior LA wall-   possible thrombus/mass lesion- consider further evaluation with PORTILLO or   CT/MRI  Mild mitral regurgitation.    TRANSESOPHAGEAL ECHOCARDIOGRAM: 6/11/2021  1. Normal left atrial appendage. No thrombus detected in the left   atrial appendage.      2. The aortic valve is heavily calcified.  There is partial fusion of   all three leaflets.  There is severe aortic stenosis versus   pseudostenois.      3. Severely reduced left ventricular systolic function.  Left   ventricular ejection fraction is visually estimated to be  15%.    ANGIOGRAM: 6/12/2021 [facility] [place]  II.  CORONARY ANGIOGRAPHY:  Left Main: Large caliber bifurcating no CAD.  Left Anterior Descending: Large caliber giving rise to several large diagonals.  There is a heavily calcified 50% proximal stenosis.  Left Circumflex: Moderate caliber supplying several obtuse marginals and lateral branch.  The mid circumflex is notable for 99% focal culprit stenosis.  Post PCI there is 0% residual stenosis and ALEC-3 flow.  Right Coronary: Large caliber and dominant vessel with a 50% proximal focal stenosis.  Supplies RPDA and RPL B.  The RPDA is notable for a 60% distal stenosis.      REVIEW OF SYSTEMS:   Review of Systems   Constitutional: Positive for malaise/fatigue. Negative for chills, diaphoresis, fever and weight loss.   HENT: Negative for congestion, ear pain, hearing loss, nosebleeds, sore throat and tinnitus.    Eyes: Negative for blurred vision, double vision, pain and discharge.   Respiratory: Positive for shortness of breath. Negative for cough, hemoptysis, sputum production, wheezing and stridor.    Cardiovascular: Positive for chest pain. Negative for palpitations, orthopnea and leg swelling.   Gastrointestinal: Negative for abdominal pain, constipation, heartburn, melena, nausea and vomiting.   Genitourinary: Negative for dysuria, flank pain and hematuria.   Musculoskeletal: Positive for joint pain. Negative for myalgias and neck pain.   Skin: Negative for itching and rash.   Neurological: Negative for dizziness, speech change, focal weakness, seizures, weakness and headaches.   Endo/Heme/Allergies: Negative for environmental allergies and polydipsia. Does not bruise/bleed easily.   Psychiatric/Behavioral: Negative for depression, hallucinations, substance abuse and suicidal ideas.       PHYSICAL EXAMINATION:   Physical Exam  Constitutional:       General: He is not in acute distress.  HENT:      Head: Normocephalic and atraumatic.      Nose: Nose normal.   Eyes:       "Conjunctiva/sclera: Conjunctivae normal.      Pupils: Pupils are equal, round, and reactive to light.   Neck:      Vascular: No JVD.      Trachea: No tracheal deviation.   Cardiovascular:      Rate and Rhythm: Normal rate and regular rhythm.      Heart sounds: Murmur heard.     Pulmonary:      Effort: Pulmonary effort is normal. No respiratory distress.      Breath sounds: Normal breath sounds. No stridor.   Abdominal:      General: Bowel sounds are normal. There is no distension.      Palpations: Abdomen is soft.      Tenderness: There is no abdominal tenderness.   Musculoskeletal:         General: No tenderness. Normal range of motion.      Cervical back: Normal range of motion and neck supple.      Right lower leg: No edema.      Left lower leg: No edema.   Skin:     General: Skin is warm and dry.   Neurological:      Mental Status: He is alert and oriented to person, place, and time.      Coordination: Coordination normal.      Gait: Gait is intact.   Psychiatric:         Mood and Affect: Mood and affect normal.         Cognition and Memory: Memory normal.       CONSTITUTIONAL:  BP (!) 88/60 (BP Location: Left arm, Patient Position: Sitting, BP Cuff Size: Adult)   Pulse 84   Temp 36.2 °C (97.2 °F) (Temporal)   Ht 1.778 m (5' 10\")   Wt 77.6 kg (171 lb)   SpO2 98%         IMPRESSION:  Symptomatic aortic stenosis, persistent atrial fibrillation, left atrial thrombus, congestive heart failure, hypertension, hyperlipidemia      PLAN:  I recommend transcatheter aortic valve replacement.  Review of his echo is concerning for low flow low gradient aortic valve stenosis.  He has minimal opening of his valve on imaging.  Given his multiple medical issues as well as his low EF, I believe open surgical intervention carries significantly higher risk for morbidity and mortality in this patient.  The procedure, its risks, benefits, potential complications and alternative treatments were discussed with the patient in " detail including the risks should he decide not to undergo my recommended treatment. All of his questions were answered to his satisfaction and he is willing to proceed with the operation. The risks include death, stroke,  infection: to include a rare bacterial infection related to the use of the heart/lung machine, kamala-operative myocardial infarction, dysrhythmias, diaphragmatic paralysis, chest wall paresthesia, tracheostomy, kidney or other organ failure, possible return to the operating room for bleeding, bleeding requiring transfusion with its attendant risks including AIDS or hepatitis, dehiscence of surgical incisions, respiratory complications including the need for prolonged ventilator support, Protamine or other drug reaction, peripheral neuropathy, loss of limb, and miscount of surgical items. The operative mortality risk is approximately 10%. The STS mortality risk score is 3% and the morbidity and mortality risk score is 20%. The scores were discussed with patient.      Sincerely,       Raymundo Paul DO.

## 2021-09-09 ENCOUNTER — OFFICE VISIT (OUTPATIENT)
Dept: CARDIOTHORACIC SURGERY | Facility: MEDICAL CENTER | Age: 76
End: 2021-09-09
Payer: MEDICARE

## 2021-09-09 VITALS
SYSTOLIC BLOOD PRESSURE: 88 MMHG | WEIGHT: 171 LBS | HEIGHT: 70 IN | TEMPERATURE: 97.2 F | DIASTOLIC BLOOD PRESSURE: 60 MMHG | HEART RATE: 84 BPM | BODY MASS INDEX: 24.48 KG/M2 | OXYGEN SATURATION: 98 %

## 2021-09-09 DIAGNOSIS — I51.89 LEFT ATRIAL MASS: ICD-10-CM

## 2021-09-09 DIAGNOSIS — I35.0 AORTIC VALVE STENOSIS, ETIOLOGY OF CARDIAC VALVE DISEASE UNSPECIFIED: ICD-10-CM

## 2021-09-09 PROCEDURE — 99205 OFFICE O/P NEW HI 60 MIN: CPT | Performed by: THORACIC SURGERY (CARDIOTHORACIC VASCULAR SURGERY)

## 2021-09-09 ASSESSMENT — ENCOUNTER SYMPTOMS
SEIZURES: 0
FLANK PAIN: 0
CONSTIPATION: 0
HEMOPTYSIS: 0
MYALGIAS: 0
HEADACHES: 0
FOCAL WEAKNESS: 0
CHILLS: 0
SHORTNESS OF BREATH: 1
HALLUCINATIONS: 0
WHEEZING: 0
WEAKNESS: 0
SPUTUM PRODUCTION: 0
NECK PAIN: 0
NAUSEA: 0
BRUISES/BLEEDS EASILY: 0
DEPRESSION: 0
ORTHOPNEA: 0
DIZZINESS: 0
PALPITATIONS: 0
COUGH: 0
ABDOMINAL PAIN: 0
SORE THROAT: 0
DIAPHORESIS: 0
EYE DISCHARGE: 0
POLYDIPSIA: 0
HEARTBURN: 0
SPEECH CHANGE: 0
FEVER: 0
BLURRED VISION: 0
EYE PAIN: 0
DOUBLE VISION: 0
WEIGHT LOSS: 0
STRIDOR: 0
VOMITING: 0

## 2021-09-09 ASSESSMENT — LIFESTYLE VARIABLES: SUBSTANCE_ABUSE: 0

## 2021-09-09 ASSESSMENT — FIBROSIS 4 INDEX: FIB4 SCORE: 1.59

## 2021-09-16 ENCOUNTER — APPOINTMENT (OUTPATIENT)
Dept: RADIOLOGY | Facility: MEDICAL CENTER | Age: 76
End: 2021-09-16
Attending: EMERGENCY MEDICINE
Payer: MEDICARE

## 2021-09-16 ENCOUNTER — NON-PROVIDER VISIT (OUTPATIENT)
Dept: CARDIOLOGY | Facility: MEDICAL CENTER | Age: 76
End: 2021-09-16
Payer: MEDICARE

## 2021-09-16 ENCOUNTER — HOSPITAL ENCOUNTER (EMERGENCY)
Facility: MEDICAL CENTER | Age: 76
End: 2021-09-16
Attending: EMERGENCY MEDICINE
Payer: MEDICARE

## 2021-09-16 VITALS
HEART RATE: 58 BPM | WEIGHT: 170.8 LBS | BODY MASS INDEX: 24.51 KG/M2 | DIASTOLIC BLOOD PRESSURE: 57 MMHG | SYSTOLIC BLOOD PRESSURE: 80 MMHG

## 2021-09-16 VITALS
HEIGHT: 70 IN | TEMPERATURE: 97.5 F | HEART RATE: 98 BPM | BODY MASS INDEX: 24.05 KG/M2 | RESPIRATION RATE: 18 BRPM | OXYGEN SATURATION: 94 % | SYSTOLIC BLOOD PRESSURE: 118 MMHG | WEIGHT: 167.99 LBS | DIASTOLIC BLOOD PRESSURE: 92 MMHG

## 2021-09-16 DIAGNOSIS — I48.20 CHRONIC A-FIB (HCC): ICD-10-CM

## 2021-09-16 DIAGNOSIS — I48.20 CHRONIC ATRIAL FIBRILLATION (HCC): ICD-10-CM

## 2021-09-16 DIAGNOSIS — R53.83 MALAISE AND FATIGUE: ICD-10-CM

## 2021-09-16 DIAGNOSIS — R53.81 MALAISE AND FATIGUE: ICD-10-CM

## 2021-09-16 LAB
ALBUMIN SERPL BCP-MCNC: 4.4 G/DL (ref 3.2–4.9)
ALBUMIN/GLOB SERPL: 1.4 G/DL
ALP SERPL-CCNC: 128 U/L (ref 30–99)
ALT SERPL-CCNC: 15 U/L (ref 2–50)
ANION GAP SERPL CALC-SCNC: 12 MMOL/L (ref 7–16)
AST SERPL-CCNC: 21 U/L (ref 12–45)
BASOPHILS # BLD AUTO: 0.8 % (ref 0–1.8)
BASOPHILS # BLD: 0.06 K/UL (ref 0–0.12)
BILIRUB SERPL-MCNC: 0.9 MG/DL (ref 0.1–1.5)
BUN SERPL-MCNC: 32 MG/DL (ref 8–22)
CALCIUM SERPL-MCNC: 9.8 MG/DL (ref 8.5–10.5)
CHLORIDE SERPL-SCNC: 103 MMOL/L (ref 96–112)
CO2 SERPL-SCNC: 23 MMOL/L (ref 20–33)
CREAT SERPL-MCNC: 1.35 MG/DL (ref 0.5–1.4)
EKG IMPRESSION: NORMAL
EOSINOPHIL # BLD AUTO: 0.15 K/UL (ref 0–0.51)
EOSINOPHIL NFR BLD: 1.9 % (ref 0–6.9)
ERYTHROCYTE [DISTWIDTH] IN BLOOD BY AUTOMATED COUNT: 49.8 FL (ref 35.9–50)
GLOBULIN SER CALC-MCNC: 3.1 G/DL (ref 1.9–3.5)
GLUCOSE SERPL-MCNC: 88 MG/DL (ref 65–99)
HCT VFR BLD AUTO: 49.5 % (ref 42–52)
HGB BLD-MCNC: 16.7 G/DL (ref 14–18)
IMM GRANULOCYTES # BLD AUTO: 0.03 K/UL (ref 0–0.11)
IMM GRANULOCYTES NFR BLD AUTO: 0.4 % (ref 0–0.9)
LYMPHOCYTES # BLD AUTO: 1.44 K/UL (ref 1–4.8)
LYMPHOCYTES NFR BLD: 18.5 % (ref 22–41)
MCH RBC QN AUTO: 32.2 PG (ref 27–33)
MCHC RBC AUTO-ENTMCNC: 33.7 G/DL (ref 33.7–35.3)
MCV RBC AUTO: 95.4 FL (ref 81.4–97.8)
MONOCYTES # BLD AUTO: 0.71 K/UL (ref 0–0.85)
MONOCYTES NFR BLD AUTO: 9.1 % (ref 0–13.4)
NEUTROPHILS # BLD AUTO: 5.4 K/UL (ref 1.82–7.42)
NEUTROPHILS NFR BLD: 69.3 % (ref 44–72)
NRBC # BLD AUTO: 0 K/UL
NRBC BLD-RTO: 0 /100 WBC
NT-PROBNP SERPL IA-MCNC: 1083 PG/ML (ref 0–125)
PLATELET # BLD AUTO: 225 K/UL (ref 164–446)
PMV BLD AUTO: 9.7 FL (ref 9–12.9)
POTASSIUM SERPL-SCNC: 5.1 MMOL/L (ref 3.6–5.5)
PROT SERPL-MCNC: 7.5 G/DL (ref 6–8.2)
RBC # BLD AUTO: 5.19 M/UL (ref 4.7–6.1)
SODIUM SERPL-SCNC: 138 MMOL/L (ref 135–145)
TROPONIN T SERPL-MCNC: 21 NG/L (ref 6–19)
TSH SERPL DL<=0.005 MIU/L-ACNC: 3.98 UIU/ML (ref 0.38–5.33)
WBC # BLD AUTO: 7.8 K/UL (ref 4.8–10.8)

## 2021-09-16 PROCEDURE — 84443 ASSAY THYROID STIM HORMONE: CPT

## 2021-09-16 PROCEDURE — 99211 OFF/OP EST MAY X REQ PHY/QHP: CPT | Performed by: NURSE PRACTITIONER

## 2021-09-16 PROCEDURE — 84484 ASSAY OF TROPONIN QUANT: CPT

## 2021-09-16 PROCEDURE — 93005 ELECTROCARDIOGRAM TRACING: CPT

## 2021-09-16 PROCEDURE — 83880 ASSAY OF NATRIURETIC PEPTIDE: CPT

## 2021-09-16 PROCEDURE — 93000 ELECTROCARDIOGRAM COMPLETE: CPT | Performed by: INTERNAL MEDICINE

## 2021-09-16 PROCEDURE — 71045 X-RAY EXAM CHEST 1 VIEW: CPT

## 2021-09-16 PROCEDURE — 99284 EMERGENCY DEPT VISIT MOD MDM: CPT

## 2021-09-16 PROCEDURE — 93005 ELECTROCARDIOGRAM TRACING: CPT | Mod: XE,76 | Performed by: EMERGENCY MEDICINE

## 2021-09-16 PROCEDURE — 85025 COMPLETE CBC W/AUTO DIFF WBC: CPT

## 2021-09-16 PROCEDURE — 80053 COMPREHEN METABOLIC PANEL: CPT

## 2021-09-16 ASSESSMENT — PAIN DESCRIPTION - PAIN TYPE: TYPE: ACUTE PAIN

## 2021-09-16 ASSESSMENT — FIBROSIS 4 INDEX
FIB4 SCORE: 1.59
FIB4 SCORE: 1.59

## 2021-09-16 NOTE — NON-PROVIDER
CHF Pharmacotherapy visit - Visit    Date of Service: 09/16/21    Informed written consent was given on: 7/29/21    Rajesh De Luna is here for CHF and lipids    HPI  Pertinent Interval History since last visit:   • Pt presents to clinic stating that he's doing well overall, but has noted somewhat of a decreased appetite since his last appt.  • Pt states he's been taking a decreased dose of his atorvastatin (1 tab QOD) d/t feeling that it has been contributing to some insomnia.  • Pt is a poor historian in regard to his medications    Most recent EF:  35% (6/2021)      Current Outpatient Medications:   •  amiodarone, 200 mg, Oral, DAILY  •  clopidogrel, 75 mg, Oral, DAILY  •  spironolactone, 25 mg, Oral, DAILY  •  metoprolol SR, 150 mg, Oral, DAILY  •  furosemide, 20 mg, Oral, QDAY PRN  •  apixaban, 5 mg, Oral, BID  •  atorvastatin, 40 mg, Oral, DAILY (Patient taking differently: 40 mg, Oral, DAILY, Every other day)    Current Adherence to CHF Therapies:  Partial - d/t atorvastatin use    Current CHF Medications - including dose:   · Entresto or ACE/ARB: N/a  · Beta blocker: Metoprolol 150 mg once daily  · Diuretic: Furosemide 20 mg once daily PRN  · Aldosterone antagonist: Spironolactone 25 mg once daily    Current lipid medications:  • Atorvastatin 40 mg once daily    Vitals:    09/16/21 1630   BP: (!) 80/57   Pulse: (!) 58       Home BP and HR:  BP 90's/50-60's, HR - does not monitor. Encouraged to do so    Change in weight: Stable    Exercise habits: moderate regular exercise program - pt walks ~ 1 mile a day      Diet: Pt has cut down on pizza. He states he's eating less lately. Coffee and Finnish, eggs, sausage, toast in the morning. Pt states he doesn't have much of an appetite. Will eat once more after this typically. Drinks 1 beer every other day. States he monitors Na intake    SOCIAL HISTORY  Social History     Tobacco Use   Smoking Status Current Some Day Smoker   • Types: Cigars   Smokeless Tobacco  Never Used   Tobacco Comment    reports Cigars, 3 per day        DATA REVIEW  No results found for: HBA1C       Lab Results   Component Value Date/Time    CHOLSTRLTOT 108 06/13/2021 02:35 AM    LDL 53 06/13/2021 02:35 AM    HDL 40 06/13/2021 02:35 AM    TRIGLYCERIDE 75 06/13/2021 02:35 AM       Lab Results   Component Value Date/Time    SODIUM 137 08/09/2021 09:51 AM    POTASSIUM 4.6 08/09/2021 09:51 AM    CHLORIDE 102 08/09/2021 09:51 AM    CO2 26 08/09/2021 09:51 AM    GLUCOSE 116 (H) 08/09/2021 09:51 AM    BUN 24 (H) 08/09/2021 09:51 AM    CREATININE 1.29 08/09/2021 09:51 AM     Lab Results   Component Value Date/Time    ALKPHOSPHAT 132 (H) 06/11/2021 01:19 AM    ASTSGOT 21 06/11/2021 01:19 AM    ALTSGPT 19 06/11/2021 01:19 AM    TBILIRUBIN 1.0 06/11/2021 01:19 AM    INR 1.29 (H) 08/09/2021 09:51 AM    ALBUMIN 3.6 06/11/2021 01:19 AM      No components found for: MICROALBUMINCREATRATIOURINE    Renal function:  Calculated creatinine clearance: ~49 ml/min     Other:  Immunization History   Administered Date(s) Administered   • Pneumococcal polysaccharide vaccine (PPSV-23) 09/23/2015     Up to date on pneumococcal vaccine? Yes      Recent Imaging Studies:    None since last visit      ASSESSMENT AND PLAN  • Pt presents to clinic today doing well overall and better since his last OV.  • Pt is adamant that he does not want to take atorvastatin 40 mg once daily - he prefers to take 40 mg QOD d/t the patient information handout that he received at the pharmacy. Encouraged pt to present this form at f/u so that he can further discuss w/ the pharmacist.  • Pt reported BP is low - his in clinic BP was also low today. His pulse was elevated x 1 then decreased. Manual pulse of pt did feel irregular - suspicious that pt may be in AF. Consulted Nimo Powers, APRN - STAT EKG ordered which did confirm that pt was in AF. Walked pt over to the ED for cardioversion.  • Pt counseled to bring his medications to f/u.    Resulting CHF  medications today (changes are bolded)  · Entresto or ACE/ARB: N/a  · Beta blocker: Metoprolol 150 mg once daily  · Diuretic: Furosemide 20 mg once daily PRN  • Aldosterone antagonist: Decrease spironolactone 12.5 mg once daily    lipid medications (no changes)  • Atorvastatin 40 mg once daily  o Counseled pt extensively on MOA, SE, and administration  o Encouraged pt to restart daily therapy vs QOD  o Advised pt to take his dose in the AM to help w/ his feelings that it causes insomnia    Lifestyle Recommendations From Today's Visit:   • Continue to eat DASH/MED style diet.   • Continue to exercise as tolerated.     Significant changes to laboratory values since last visit that require repeat labs:  • None    Blood Work Ordered At Today's visit:   None    Studies Ordered at Todays Visit:  None     Follow-Up:   3 weeks    Greg Mehta PharmD      CC:  Pcp Pt States None  No ref. provider found

## 2021-09-17 LAB — EKG IMPRESSION: NORMAL

## 2021-09-17 NOTE — ED PROVIDER NOTES
CHIEF COMPLAINT(1/4)  Chief Complaint   Patient presents with   • Sent by MD   • Weakness       HPI  Rajesh De Luna is a 76 y.o. male who presents for hypotension and abnormal EKG earlier today. States he went to the [facility] earlier today to  his prescriptions, where he was found to be hypotensive via 3 abnormal BP readings and later an EKG was ordered, which was abnormal. States he hasn't been feeling well for a while. Does report he has a mild pressure felt in his chest that has been chronic, but was worse today. Mentions he has been stressed lately. Also, complains of a headache at the back of his head and tense back neck muscles. Denies fever, chills, dizziness, nausea, vomiting, fainting, shortness of breath and palpitations. States he takes his medications regularly. Anticoagulated with Eliquis. Has an appointment on 9/22 with Dr. Herzog.         REVIEW OF SYSTEMS(1/10)  Pertinent positives include: Chest pressure, headache  Pertinent negatives include: fever, chills, nausea, vomiting, dizziness, fainting, shortness of breath and palpitations .   All other systems are negative.     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    SOCIAL HISTORY  Social History     Tobacco Use   • Smoking status: Current Some Day Smoker     Types: Cigars   • Smokeless tobacco: Never Used   • Tobacco comment: reports Cigars, 3 per day   Vaping Use   • Vaping Use: Never used   Substance Use Topics   • Alcohol use: Yes     Comment: reports 2/day   • Drug use: Not Currently     Social History     Substance and Sexual Activity   Drug Use Not Currently       SURGICAL  "HISTORY  Past Surgical History:   Procedure Laterality Date   • OTHER SURGICAL PROCEDURE  02/2018    dental extractions and cancer from lip removed, followed by radiation   • CARDIAC CATH, LEFT HEART         CURRENT MEDICATIONS  Home Medications    **Home medications have not yet been reviewed for this encounter**         ALLERGIES  Allergies   Allergen Reactions   • Penicillins Unspecified              PHYSICAL EXAM  VITAL SIGNS: /59   Pulse (!) 50 Comment: irregular  Temp 36.3 °C (97.4 °F) (Temporal)   Resp (!) 22   Ht 1.778 m (5' 10\")   Wt 76.2 kg (167 lb 15.9 oz)   SpO2 95%   BMI 24.10 kg/m²  Reviewed and are reassuring. HR is not 50. He shows rate controlled afib.   Constitutional:Pleasant elderly male in no acute distress.   HENT: Normocephalic, atraumatic, bilateral external ears normal, oropharynx mildly moist, No exudates or erythema. Poor dentition  Eyes: PERRLA, conjunctiva pink, no scleral icterus.   Cardiovascular: Irregularry, irregular  Respiratory: Clear to auscultation, No wheezing, No respiratory distress  Gastrointestinal: Soft, nontender, nondistended, normal bowel sounds  Skin: No erythema, no rash.   Genitourinary:  No costovertebral angle tenderness.   Extremities:  No BLE edema.  Neurologic: Alert & oriented x 3, cranial nerves 2-12 intact by passive exam.  No focal deficit noted.  Psychiatric: Affect normal, Judgment normal, Mood normal.     DIFFERENTIAL DIAGNOSIS:  Hypotension, Chronic Atrial Fibrillation anticoagulated on Eliquis.    EKG  Atrial fibrillation, rate .    RADIOLOGY/PROCEDURES  DX-CHEST-LIMITED (1 VIEW)   Final Result         No acute cardiac or pulmonary abnormality is identified.          LABORATORY: Reviewed as below.  Results for orders placed or performed during the hospital encounter of 09/16/21   CBC WITH DIFFERENTIAL   Result Value Ref Range    WBC 7.8 4.8 - 10.8 K/uL    RBC 5.19 4.70 - 6.10 M/uL    Hemoglobin 16.7 14.0 - 18.0 g/dL    Hematocrit 49.5 42.0 " - 52.0 %    MCV 95.4 81.4 - 97.8 fL    MCH 32.2 27.0 - 33.0 pg    MCHC 33.7 33.7 - 35.3 g/dL    RDW 49.8 35.9 - 50.0 fL    Platelet Count 225 164 - 446 K/uL    MPV 9.7 9.0 - 12.9 fL    Neutrophils-Polys 69.30 44.00 - 72.00 %    Lymphocytes 18.50 (L) 22.00 - 41.00 %    Monocytes 9.10 0.00 - 13.40 %    Eosinophils 1.90 0.00 - 6.90 %    Basophils 0.80 0.00 - 1.80 %    Immature Granulocytes 0.40 0.00 - 0.90 %    Nucleated RBC 0.00 /100 WBC    Neutrophils (Absolute) 5.40 1.82 - 7.42 K/uL    Lymphs (Absolute) 1.44 1.00 - 4.80 K/uL    Monos (Absolute) 0.71 0.00 - 0.85 K/uL    Eos (Absolute) 0.15 0.00 - 0.51 K/uL    Baso (Absolute) 0.06 0.00 - 0.12 K/uL    Immature Granulocytes (abs) 0.03 0.00 - 0.11 K/uL    NRBC (Absolute) 0.00 K/uL   CMP   Result Value Ref Range    Sodium 138 135 - 145 mmol/L    Potassium 5.1 3.6 - 5.5 mmol/L    Chloride 103 96 - 112 mmol/L    Co2 23 20 - 33 mmol/L    Anion Gap 12.0 7.0 - 16.0    Glucose 88 65 - 99 mg/dL    Bun 32 (H) 8 - 22 mg/dL    Creatinine 1.35 0.50 - 1.40 mg/dL    Calcium 9.8 8.5 - 10.5 mg/dL    AST(SGOT) 21 12 - 45 U/L    ALT(SGPT) 15 2 - 50 U/L    Alkaline Phosphatase 128 (H) 30 - 99 U/L    Total Bilirubin 0.9 0.1 - 1.5 mg/dL    Albumin 4.4 3.2 - 4.9 g/dL    Total Protein 7.5 6.0 - 8.2 g/dL    Globulin 3.1 1.9 - 3.5 g/dL    A-G Ratio 1.4 g/dL   TROPONIN   Result Value Ref Range    Troponin T 21 (H) 6 - 19 ng/L   proBrain Natriuretic Peptide, NT   Result Value Ref Range    NT-proBNP 1083 (H) 0 - 125 pg/mL   ESTIMATED GFR   Result Value Ref Range    GFR If African American >60 >60 mL/min/1.73 m 2    GFR If Non  51 (A) >60 mL/min/1.73 m 2   EKG (NOW)   Result Value Ref Range    Report       [facility] Emergency Dept.    Test Date:  2021  Pt Name:    JACOBO RAMIREZ             Department: ER  MRN:        [id number]                      Room:  Gender:     Male                         Technician: [id number]  :        1945                    Requested By:ER TRIAGE PROTOCOL  Order #:    [id number]                    Reading MD: KIM OLIVEIRA MD    Measurements  Intervals                                Axis  Rate:       109                          P:  VA:                                      QRS:        0  QRSD:       88                           T:          83  QT:         368  QTc:        496    Interpretive Statements  ATRIAL FIBRILLATION, V-RATE    BORDERLINE PROLONGED QT INTERVAL  Compared to ECG 2021 16:43:55  No significant changes  Electronically Signed On 2021 18:39:50 PDT by KIM OLIVEIRA MD     EKG (NOW)   Result Value Ref Range    Report       [facility] Emergency Dept.    Test Date:  2021  Pt Name:    JACOBO RAMIREZ             Department: ER  MRN:        [id number]                      Room:        05  Gender:     Male                         Technician: [id number]  :        1945                   Requested By:KIM OLIVEIRA  Order #:    [id number]                    Reading MD: KIM OLIVEIRA MD    Measurements  Intervals                                Axis  Rate:       82                           P:  VA:                                      QRS:        -2  QRSD:       90                           T:  QT:         380  QTc:        444    Interpretive Statements  Atrial fibrillation  Borderline T abnormalities, diffuse leads  Compared to ECG 2021 17:25:11  T-wave abnormality now present  Electronically Signed On 2021 19:21:53 PDT by KIM OLIVEIRA MD           COURSE & MEDICAL DECISION MAKING  Discussed with Dr. Crump.  We discussed the patient's clinic visit, and cardiology feels that this patient, who is demonstrating stability here, can be discharged, and he will put the patient on the schedule to make sure that he has followed up with a electrophysiology appointment.    7:25 PM labs and chest x-ray are reassuring.  Patient has an elevated BNP, but it is much  lower than his previous values, showing no evidence of severe fluid overload.  His chest x-ray is unremarkable.  Similarly, he has a extremely small troponin elevation, again improved from previous values.  He is stable and appropriate for discharge.        PLAN:  Discharge home to continue all home medications.  [facility] cardiology will ensure electrophysiology follow-up.    CONDITION: Stable.    FINAL IMPRESSION  1. Malaise and fatigue    2. Chronic a-fib (HCC)          Electronically signed by: Vinh Rosas M.D., 9/16/2021 6:40 PM      I independently evaluated the patient and repeated the important components of the history and physical. I discussed the management with the resident. I have reviewed and agree with the pertinent clinical information above including history, exam, study findings, and recommendations.  This patient was brought from cardiology clinic due to transient reported hypotension and bradycardia.  He has not shown either of those here.  He has showed stable, chronic rate controlled atrial fibrillation here, with reassuring lab work.  We have discussed his case with [facility] cardiology, who feels that he can be discharged home, and they will arrange close follow-up with electrophysiology.  The patient will be given close return precautions.     Electronically signed by: Vinh Rosas M.D., 9/16/2021 7:26 PM

## 2021-09-17 NOTE — ED NOTES
Postural vitals as charted. Pt denies any dizziness when standing, gait is steady. Discharge teaching and paperwork provided regarding malaise, fatigue & chronic a-fib, and all questions/concerns answered. VSS, cardiac assessment stable and PIV removed. Given information regarding home care and reasons to follow up with ED or primary MD. Patient discharged and ambulatory out of the ED.

## 2021-09-17 NOTE — ED TRIAGE NOTES
"Chief Complaint   Patient presents with   • Sent by MD   • Weakness     75 yo male ambulatory to triage for above complaint. [facility] due to abnormal EKG and hypotension. Pt reports feeling \"off\" for \"a while\", also states he has a sensation in his chest upon standing. EKG completed, pt slightly hypotensive on arrival. Charge RN notified.     Educated on triage process, encourage to inform staff of any changes.     /59   Pulse (!) 50 Comment: irregular  Temp 36.3 °C (97.4 °F) (Temporal)   Resp (!) 22   Ht 1.778 m (5' 10\")   Wt 76.2 kg (167 lb 15.9 oz)   SpO2 95%   BMI 24.10 kg/m²   "

## 2021-09-17 NOTE — DISCHARGE INSTRUCTIONS
Your tests here were very reassuring.  We spoke with [facility], and they will contact you to schedule a close follow-up appointment.  Return here for worsening or severe symptoms in the meantime.  Make sure you continue all your home medications the way they are prescribed.

## 2021-09-22 ENCOUNTER — DOCUMENTATION (OUTPATIENT)
Dept: CARDIOLOGY | Facility: MEDICAL CENTER | Age: 76
End: 2021-09-22

## 2021-09-22 ENCOUNTER — OFFICE VISIT (OUTPATIENT)
Dept: CARDIOLOGY | Facility: MEDICAL CENTER | Age: 76
End: 2021-09-22
Payer: MEDICARE

## 2021-09-22 VITALS
RESPIRATION RATE: 14 BRPM | WEIGHT: 171.8 LBS | HEIGHT: 70 IN | SYSTOLIC BLOOD PRESSURE: 100 MMHG | BODY MASS INDEX: 24.6 KG/M2 | HEART RATE: 92 BPM | DIASTOLIC BLOOD PRESSURE: 68 MMHG | OXYGEN SATURATION: 97 %

## 2021-09-22 DIAGNOSIS — I35.0 NONRHEUMATIC AORTIC VALVE STENOSIS: ICD-10-CM

## 2021-09-22 DIAGNOSIS — I35.0 SEVERE AORTIC STENOSIS: ICD-10-CM

## 2021-09-22 DIAGNOSIS — Z01.810 PRE-OPERATIVE CARDIOVASCULAR EXAMINATION: ICD-10-CM

## 2021-09-22 PROBLEM — I51.89 LEFT ATRIAL MASS: Status: RESOLVED | Noted: 2021-06-10 | Resolved: 2021-09-22

## 2021-09-22 PROBLEM — I51.3 LA THROMBUS: Status: RESOLVED | Noted: 2021-06-10 | Resolved: 2021-09-22

## 2021-09-22 PROCEDURE — 99214 OFFICE O/P EST MOD 30 MIN: CPT | Performed by: INTERNAL MEDICINE

## 2021-09-22 ASSESSMENT — FIBROSIS 4 INDEX: FIB4 SCORE: 1.88

## 2021-09-22 NOTE — PROGRESS NOTES
Structural heart initial Consultation Note    Date of note:    9/22/2021    Primary Care Provider: Pcp Pt States None  Referring Provider: No ref. provider found     Patient Name: Rajesh De Luna   YOB: 1945  MRN:              [id number]    Chief Complaint: Aortic stenosis    Rajesh De Luna is a 76 y.o. male  patient referred by Dr. Ruiz for aortic stenosis.  He was admitted in June with atrial fibrillation, non-ST elevation MI, found to have severely depressed ejection fraction, underwent PCI of marginal branch.  He was cardioverted but unsuccessful.  Echocardiogram showed severely calcified aortic valve with decreased excursion but normal gradients.  He has significant fatigue, dyspnea on exertion.  No significant edema.    ROS    All other systems reviewed and discussed using a comprehensive questionnaire and are negative.     Past medical history, family history, social history, allergies and labs are reviewed and updated as needed as documented below.    Past Medical History:   Diagnosis Date   • A-fib ([facility])    • Anxiety    • Breath shortness     with exertion   • Cancer ([facility]) 2018    Lip cancer   • Dental disorder     no teeth   • Depression    • Heart burn    • High cholesterol    • History of chronic cough    • Hypertension    • Urinary incontinence          Past Surgical History:   Procedure Laterality Date   • OTHER SURGICAL PROCEDURE  02/2018    dental extractions and cancer from lip removed, followed by radiation   • CARDIAC CATH, LEFT HEART           Current Outpatient Medications   Medication Sig Dispense Refill   • amiodarone (CORDARONE) 200 MG Tab Take 1 Tablet by mouth every day. 100 Tablet 2   • clopidogrel (PLAVIX) 75 MG Tab Take 1 tablet by mouth every day. 90 Tablet 2   • spironolactone (ALDACTONE) 25 MG Tab Take 1 Tablet by mouth every day. 100 Tablet 2   • metoprolol SR (TOPROL XL) 50 MG TABLET SR 24 HR Take 3 Tablets by mouth every day. 180 Tablet 5   •  furosemide (LASIX) 20 MG Tab Take 1 Tablet by mouth 1 time a day as needed. as needed for weight gain greater than 3 pounds in 1 day or 5 pounds in 1 week. 30 Tablet 11   • apixaban (ELIQUIS) 5mg Tab Take 1 tablet by mouth 2 times a day. 60 tablet 11   • atorvastatin (LIPITOR) 40 MG Tab Take 1 tablet by mouth every day. (Patient taking differently: Take 40 mg by mouth every day. Every other day) 90 tablet 1     No current facility-administered medications for this visit.         Allergies   Allergen Reactions   • Penicillins Unspecified                History reviewed. No pertinent family history.      Social History     Socioeconomic History   • Marital status: Single     Spouse name: Not on file   • Number of children: Not on file   • Years of education: Not on file   • Highest education level: Not on file   Occupational History   • Not on file   Tobacco Use   • Smoking status: Current Some Day Smoker     Types: Cigars   • Smokeless tobacco: Never Used   • Tobacco comment: reports Cigars, 3 per day   Vaping Use   • Vaping Use: Never used   Substance and Sexual Activity   • Alcohol use: Yes     Comment: reports 2/day   • Drug use: Not Currently   • Sexual activity: Not on file   Other Topics Concern   • Not on file   Social History Narrative   • Not on file     Social Determinants of Health     Financial Resource Strain:    • Difficulty of Paying Living Expenses:    Food Insecurity:    • Worried About Running Out of Food in the Last Year:    • Ran Out of Food in the Last Year:    Transportation Needs:    • Lack of Transportation (Medical):    • Lack of Transportation (Non-Medical):    Physical Activity:    • Days of Exercise per Week:    • Minutes of Exercise per Session:    Stress:    • Feeling of Stress :    Social Connections:    • Frequency of Communication with Friends and Family:    • Frequency of Social Gatherings with Friends and Family:    • Attends [employer] Services:    • Active Member of Clubs or  "Organizations:    • Attends Club or Organization Meetings:    • Marital Status:    Intimate Partner Violence:    • Fear of Current or Ex-Partner:    • Emotionally Abused:    • Physically Abused:    • Sexually Abused:          Physical Exam:  Ambulatory Vitals  /68 (BP Location: Left arm, Patient Position: Sitting, BP Cuff Size: Adult)   Pulse 92   Resp 14   Ht 1.778 m (5' 10\")   Wt 77.9 kg (171 lb 12.8 oz)   SpO2 97%    Oxygen Therapy:  Pulse Oximetry: 97 %  BP Readings from Last 4 Encounters:   09/22/21 100/68   09/16/21 118/92   09/16/21 (!) 80/57   09/09/21 (!) 88/60       Weight/BMI: Body mass index is 24.65 kg/m².  Wt Readings from Last 4 Encounters:   09/22/21 77.9 kg (171 lb 12.8 oz)   09/16/21 76.2 kg (167 lb 15.9 oz)   09/16/21 77.5 kg (170 lb 12.8 oz)   09/09/21 77.6 kg (171 lb)           General: Well appearing and in no apparent distress  Head: atrumatic  Eyes: No conjunctival pallor   ENT: normal external appearance of nose and ears  Neck: JVD absent, carotid bruits absent  Lungs: respiratory sounds  normal, additional breath sounds absent  Heart: Regular rhythm,   No palpable thrills on palpation, 3 x 6 systolic murmur aortic area, no rubs,   Lower extremity edema absent.   Abdomen: soft, non tender, non distended.  Extremities/MSK: no clubbing, no cyanosis  Neurological: normal orientation, Gait normal   Psychiatric: Appropriate affect, intact judgement and insight  Skin: Warm extremities        Lab Data Review:  Lab Results   Component Value Date/Time    CHOLSTRLTOT 108 06/13/2021 02:35 AM    LDL 53 06/13/2021 02:35 AM    HDL 40 06/13/2021 02:35 AM    TRIGLYCERIDE 75 06/13/2021 02:35 AM       Lab Results   Component Value Date/Time    SODIUM 138 09/16/2021 06:36 PM    POTASSIUM 5.1 09/16/2021 06:36 PM    CHLORIDE 103 09/16/2021 06:36 PM    CO2 23 09/16/2021 06:36 PM    GLUCOSE 88 09/16/2021 06:36 PM    BUN 32 (H) 09/16/2021 06:36 PM    CREATININE 1.35 09/16/2021 06:36 PM     Lab Results "   Component Value Date/Time    ALKPHOSPHAT 128 (H) 2021 06:36 PM    ASTSGOT 21 2021 06:36 PM    ALTSGPT 15 2021 06:36 PM    TBILIRUBIN 0.9 2021 06:36 PM      Lab Results   Component Value Date/Time    WBC 7.8 2021 06:36 PM     Hospital discharge notes reviewed.   notes reviewed.    Echocardiogram, PORTILLO reviewed from 2021, personally interpreted shows severely depressed ejection fraction, calcified aortic valve with decreased excursion    Medical Decision Makin-year-old male patient with nonischemic cardiomyopathy, atrial fibrillation, coronary artery disease status post PCI of marginal branch, hypertension, hyperlipidemia with possible severe aortic stenosis.  He has severely calcified aortic valve with decreased excursion but normal gradients.  He will need dobutamine stress echocardiogram to determine true severe aortic stenosis versus pseudostenosis.  We will schedule that as soon as possible.  Discussed briefly regarding TAVR procedure if he needs it.      This note was dictated using Dragon speech recognition software.    Emeka HAYES  Interventional cardiologist  [facility], [address]  [address] [address]  Phone: [phone]  Fax: [phone]

## 2021-09-22 NOTE — PROGRESS NOTES
Per Dr. Herzog, patient to complete DSE prior to proceeding with CTA and carotid. Will cancel testing scheduled for 9/23/21. Patient states understanding and agrees with plan.   Reviewed that if DSE shows severe AS, per Dr. Herzog we will reschedule testing and proceed with TAVR potentially in Oct/ Nov.     Psychosocial Assessment:    Mental Status Assessment:  Appearance: well groomed/normal  Behavior: friendly  Mood/Affect: pleasant/normal  Thought process/content: normal  Cognition: normal  Functional ability: moderate limitation due to YING  Dental Concerns: no teeth, teeth removed 2017 prior to radiation therapy for oral cancer, remission x4 years  Dental Clearance warranted: none  Further mental assessment needed: none    Post-op Plan of Care:  Support systems: friend Ce and Alejandrina at [facility]  Patient understands discharge plan: yes  DME: none  Home health warranted prior to procedure: [facility] health aids PRN  Social concerns for discharge: no family, only friends in [facility]  PCP alerted of social concerns: n/a  POLST: n/a  Advance directive: will complete  Flu/PNA vaccines completed: refuses vaccines    Concerns prior to procedure: Patient lives in [facility] and will have fellow residents/friends as post-op support. No living family per patient.     All patients questions and concerns were addressed during this visit. They understood pre-operative and post-operative plan of care.

## 2021-09-23 ENCOUNTER — APPOINTMENT (OUTPATIENT)
Dept: RADIOLOGY | Facility: MEDICAL CENTER | Age: 76
End: 2021-09-23
Attending: INTERNAL MEDICINE
Payer: MEDICARE

## 2021-09-29 ENCOUNTER — HOSPITAL ENCOUNTER (OUTPATIENT)
Dept: CARDIOLOGY | Facility: MEDICAL CENTER | Age: 76
End: 2021-09-29
Attending: INTERNAL MEDICINE
Payer: MEDICARE

## 2021-09-29 ENCOUNTER — PHARMACY VISIT (OUTPATIENT)
Dept: PHARMACY | Facility: MEDICAL CENTER | Age: 76
End: 2021-09-29
Payer: MEDICARE

## 2021-09-29 DIAGNOSIS — I35.0 NONRHEUMATIC AORTIC VALVE STENOSIS: ICD-10-CM

## 2021-09-29 LAB — LV EJECT FRACT  99904: 30

## 2021-09-29 PROCEDURE — 93350 STRESS TTE ONLY: CPT | Mod: 26 | Performed by: INTERNAL MEDICINE

## 2021-09-29 PROCEDURE — RXMED WILLOW AMBULATORY MEDICATION CHARGE: Performed by: NURSE PRACTITIONER

## 2021-09-29 PROCEDURE — 93350 STRESS TTE ONLY: CPT

## 2021-09-29 PROCEDURE — RXMED WILLOW AMBULATORY MEDICATION CHARGE: Performed by: STUDENT IN AN ORGANIZED HEALTH CARE EDUCATION/TRAINING PROGRAM

## 2021-09-29 RX ORDER — NITROGLYCERIN 0.4 MG/1
TABLET SUBLINGUAL
Status: DISCONTINUED
Start: 2021-09-29 | End: 2021-09-30 | Stop reason: HOSPADM

## 2021-09-29 RX ORDER — LABETALOL HYDROCHLORIDE 5 MG/ML
INJECTION, SOLUTION INTRAVENOUS
Status: DISCONTINUED
Start: 2021-09-29 | End: 2021-09-30 | Stop reason: HOSPADM

## 2021-09-29 NOTE — PROGRESS NOTES
Dobutamine Stress Test    PRE-PROCEDURE:   Clinical History: Completed  Medical Diagnosis: Cardiovascular Disease   Pertinent Previous Physical Findings: Murmurs  History of Symptoms: Shortness of Breath  Recent Health: None  Other Habits: None  Family History: Not Applicable  NPO Status: Yes  Medications Taken Day of Test: Amiodarone, apixaban, clopidogrel,   Consent: Completed  Time Out: Completed    ASSESSMENT:  Neuro: Within Normal Limits  Pulse Rate: Irregular  Resting Blood Pressure: 111/76  Lung Auscultation: Clear to Auscultation, Good Aeration, No Wheezes, Rales or Rhonchi  Palpation and Auscultation of Carotid Arteries: done  Auscultation of Heart Sounds: Within Normal Limits  Palpation and Inspection of Lower Extremities: None    CONTRAINDICATIONS: none    PROCEDURE PREPARATION:  IV started: # 20 gauge IV started in Left forearm   Explanation of procedure to patient: done  Crash cart: present  Labetalol, atropine, and nitro at bedside: yes    PRE-PROCEDURE:  HR: 92  BP: 111/76    5 mcg at 5 minutes:  HR:104  BP:85/51    10 mcg at 15 minutes:  HR:117  BP: 96/65    15 mcg at 20 minutes:  HR: 119  BP: 91/52    20 mcg at 25 min  HR: 143  BP: 84/42    STOPPED:  HR: 135  BP: 65/39    Please refer to MAR for medications.     POST PROCEDURE (RECOVERY):  HR: 114   BP: 88/55  IV Discontinued: Completed    TEST TERMINATION: Achieved 85% of Maximum Predicted Heart Rate

## 2021-09-30 ENCOUNTER — TELEPHONE (OUTPATIENT)
Dept: SCHEDULING | Facility: IMAGING CENTER | Age: 76
End: 2021-09-30

## 2021-09-30 ENCOUNTER — TELEPHONE (OUTPATIENT)
Dept: CARDIOLOGY | Facility: MEDICAL CENTER | Age: 76
End: 2021-09-30

## 2021-09-30 NOTE — TELEPHONE ENCOUNTER
Left voice message with [facility] regarding need for patient to return call to arranging for additional testing. Provided direct contact information and encouraged return call. Awaiting call.     SC- MELITAI per [place] patient to complete CTA and present to team at  to determine plan of care.

## 2021-09-30 NOTE — TELEPHONE ENCOUNTER
AK     Pt is calling and wanting to discuss the results of his surgery and to discuss his medications. He is confused as to what it is he should be taking. Best ph# to reach him at is [phone].      Thank You      Kevin ACHARYA

## 2021-10-01 ENCOUNTER — TELEPHONE (OUTPATIENT)
Dept: CARDIOLOGY | Facility: MEDICAL CENTER | Age: 76
End: 2021-10-01

## 2021-10-01 ENCOUNTER — DOCUMENTATION (OUTPATIENT)
Dept: CARDIOLOGY | Facility: MEDICAL CENTER | Age: 76
End: 2021-10-01

## 2021-10-01 NOTE — TELEPHONE ENCOUNTER
Left voice message with Carloz jimenes regarding CTA scheduled at [address] 10/7 checking in at 0745. Provided direct contact information and Requested to have patient return call to confirm receipt of this message. Awaiting return call.

## 2021-10-01 NOTE — PROGRESS NOTES
Valve Program Functional Assessment: PRE-OP TAVR    KCCQ12   1a) Showering/bathing: 3  1b) Walking 1 block on ground: 4  1c) Hurrying or joggin  2) Swellin  3) Fatigue: 2  4) Shortness of breath: 2  5) Sleep sitting up: 5  6) Limited enjoyment of life: 3  7) Spend the rest of your life with HF: 2  8a) Hobbies, recreational activities:3  8b) Working or doing household chores:2  8c) Visiting family or friends: 4    5 meter walk test  1) __5.39____ s/5m  2) __5.40____ s/5m  3) __6.03____ s/5m     Strength   1) _20_____ kg  2) _20_____ kg  3) _15_____ kg    TABARES ADLs  Patient independently preforms...   - Bathing: Yes   - Dressing: Yes   - Toileting: Yes   - Transferring: Yes   - Continence: Yes   - Feeding: Yes     Living Situation  Patient lives: alone    Mobility Aids   Patient uses: none

## 2021-10-01 NOTE — TELEPHONE ENCOUNTER
Spoke with patient's neighbor Jerrell ph:[phone] who states patient had a questions regarding if she should resume taking his medication. Assured Jerrell patient is to resume all medications as prescribed.     Reviewed also patient is arranged for CTA and carotid duplex at [address] 10/7 checking in at 0745 on the ground floor just past the elevators.     Jerrell reads back all information accurately, states she will go to patients apartment and relay message promptly.

## 2021-10-07 ENCOUNTER — HOSPITAL ENCOUNTER (OUTPATIENT)
Dept: RADIOLOGY | Facility: MEDICAL CENTER | Age: 76
End: 2021-10-07
Attending: INTERNAL MEDICINE
Payer: MEDICARE

## 2021-10-07 DIAGNOSIS — R06.02 SHORTNESS OF BREATH: ICD-10-CM

## 2021-10-07 DIAGNOSIS — Z01.810 PRE-OPERATIVE CARDIOVASCULAR EXAMINATION: ICD-10-CM

## 2021-10-07 DIAGNOSIS — I35.0 SEVERE AORTIC STENOSIS: ICD-10-CM

## 2021-10-07 PROCEDURE — 700117 HCHG RX CONTRAST REV CODE 255: Performed by: INTERNAL MEDICINE

## 2021-10-07 PROCEDURE — 71275 CT ANGIOGRAPHY CHEST: CPT

## 2021-10-07 PROCEDURE — 74174 CTA ABD&PLVS W/CONTRAST: CPT

## 2021-10-07 RX ADMIN — IOHEXOL 100 ML: 350 INJECTION, SOLUTION INTRAVENOUS at 08:23

## 2021-10-07 NOTE — TELEPHONE ENCOUNTER
Hello, this patient stopped by the office today, he would like a call back, patient  is having some questions about his medication and which ones should he be taking. Patient can be reached at [phone].        Thank you!

## 2021-10-08 ENCOUNTER — TELEPHONE (OUTPATIENT)
Dept: CARDIOLOGY | Facility: MEDICAL CENTER | Age: 76
End: 2021-10-08

## 2021-10-11 PROBLEM — K44.9 HIATAL HERNIA: Status: ACTIVE | Noted: 2021-10-11

## 2021-10-11 PROBLEM — K57.90 DIVERTICULOSIS: Status: ACTIVE | Noted: 2021-10-11

## 2021-10-11 PROBLEM — N28.89 RENAL SCARRING: Status: ACTIVE | Noted: 2021-10-11

## 2021-10-11 PROBLEM — D73.89 SPLENIC CALCIFICATION: Status: ACTIVE | Noted: 2021-10-11

## 2021-10-20 NOTE — TELEPHONE ENCOUNTER
Received message that patient has questions regarding medications.     Returned call. Left voice message at [facility] encouraging return call to discuss Rx questions in detail. Provided direct contact information and encouraged return call. Awaiting return call.

## 2021-10-28 ENCOUNTER — PHARMACY VISIT (OUTPATIENT)
Dept: PHARMACY | Facility: MEDICAL CENTER | Age: 76
End: 2021-10-28
Payer: MEDICARE

## 2021-10-28 PROCEDURE — RXMED WILLOW AMBULATORY MEDICATION CHARGE: Performed by: STUDENT IN AN ORGANIZED HEALTH CARE EDUCATION/TRAINING PROGRAM

## 2021-10-28 PROCEDURE — RXMED WILLOW AMBULATORY MEDICATION CHARGE: Performed by: NURSE PRACTITIONER

## 2021-11-04 ENCOUNTER — HOSPITAL ENCOUNTER (OUTPATIENT)
Facility: MEDICAL CENTER | Age: 76
End: 2021-11-04
Attending: INTERNAL MEDICINE | Admitting: INTERNAL MEDICINE
Payer: MEDICARE

## 2021-11-04 DIAGNOSIS — I35.0 SEVERE AORTIC STENOSIS: ICD-10-CM

## 2021-11-04 DIAGNOSIS — Z00.6 EXAMINATION OF PARTICIPANT IN CLINICAL TRIAL: ICD-10-CM

## 2021-11-11 ENCOUNTER — HOSPITAL ENCOUNTER (OUTPATIENT)
Dept: CARDIOLOGY | Facility: MEDICAL CENTER | Age: 76
End: 2021-11-11
Attending: INTERNAL MEDICINE
Payer: MEDICARE

## 2021-11-11 ENCOUNTER — DOCUMENTATION (OUTPATIENT)
Dept: CARDIOLOGY | Facility: MEDICAL CENTER | Age: 76
End: 2021-11-11

## 2021-11-11 DIAGNOSIS — Z95.5 S/P DRUG ELUTING CORONARY STENT PLACEMENT: ICD-10-CM

## 2021-11-11 DIAGNOSIS — I35.0 SEVERE AORTIC STENOSIS: ICD-10-CM

## 2021-11-11 DIAGNOSIS — I50.9 HEART FAILURE, NYHA CLASS 3 (HCC): ICD-10-CM

## 2021-11-11 DIAGNOSIS — I25.10 CORONARY ARTERY DISEASE INVOLVING NATIVE CORONARY ARTERY OF NATIVE HEART WITHOUT ANGINA PECTORIS: ICD-10-CM

## 2021-11-11 DIAGNOSIS — Z79.01 CHRONIC ANTICOAGULATION: ICD-10-CM

## 2021-11-11 DIAGNOSIS — I50.20 ACC/AHA STAGE C SYSTOLIC HEART FAILURE (HCC): ICD-10-CM

## 2021-11-11 DIAGNOSIS — I48.19 PERSISTENT ATRIAL FIBRILLATION (HCC): ICD-10-CM

## 2021-11-11 PROCEDURE — 93306 TTE W/DOPPLER COMPLETE: CPT

## 2021-11-11 NOTE — PROGRESS NOTES
Valve Conference Heart Team Plan of Care: 11/10/12    Valve candidate: no  With Possible open heart: no  Access: n/a  Valve Size: n/a  Cerebral Protection: n/a  Anesthesia: n/a  Unit: n/a  Incidental findings: n/a  Clearance needed prior to procedure: n/a      Plan of care: Patient to undergo cardioversion and medical therapy amiodarone 400 PO BID    Left voice message at [facility] requesting patient return call to discuss such in detail. Awaiting return call.

## 2021-11-12 LAB
LV EJECT FRACT  99904: 40
LV EJECT FRACT MOD 2C 99903: 44.09
LV EJECT FRACT MOD 4C 99902: 33.72
LV EJECT FRACT MOD BP 99901: 41

## 2021-11-12 PROCEDURE — 93306 TTE W/DOPPLER COMPLETE: CPT | Mod: 26 | Performed by: INTERNAL MEDICINE

## 2021-11-18 DIAGNOSIS — Z79.01 CHRONIC ANTICOAGULATION: ICD-10-CM

## 2021-11-18 RX ORDER — CLOPIDOGREL BISULFATE 75 MG/1
75 TABLET ORAL DAILY
Qty: 90 TABLET | Refills: 3 | Status: SHIPPED | OUTPATIENT
Start: 2021-11-18 | End: 2022-04-22 | Stop reason: SDUPTHER

## 2021-11-19 ENCOUNTER — NON-PROVIDER VISIT (OUTPATIENT)
Dept: CARDIOLOGY | Facility: MEDICAL CENTER | Age: 76
End: 2021-11-19
Payer: MEDICARE

## 2021-11-19 ENCOUNTER — HOSPITAL ENCOUNTER (OUTPATIENT)
Facility: MEDICAL CENTER | Age: 76
End: 2021-11-20
Attending: EMERGENCY MEDICINE | Admitting: STUDENT IN AN ORGANIZED HEALTH CARE EDUCATION/TRAINING PROGRAM
Payer: MEDICARE

## 2021-11-19 ENCOUNTER — APPOINTMENT (OUTPATIENT)
Dept: RADIOLOGY | Facility: MEDICAL CENTER | Age: 76
End: 2021-11-19
Attending: EMERGENCY MEDICINE
Payer: MEDICARE

## 2021-11-19 DIAGNOSIS — I48.0 PAROXYSMAL ATRIAL FIBRILLATION (HCC): ICD-10-CM

## 2021-11-19 DIAGNOSIS — Z79.01 CURRENT USE OF LONG TERM ANTICOAGULATION: ICD-10-CM

## 2021-11-19 DIAGNOSIS — R42 POSTURAL DIZZINESS: ICD-10-CM

## 2021-11-19 DIAGNOSIS — I48.20 CHRONIC ATRIAL FIBRILLATION (HCC): ICD-10-CM

## 2021-11-19 DIAGNOSIS — I48.3 TYPICAL ATRIAL FLUTTER (HCC): ICD-10-CM

## 2021-11-19 PROBLEM — I48.92 ATRIAL FLUTTER (HCC): Status: ACTIVE | Noted: 2021-11-19

## 2021-11-19 PROBLEM — N18.31 ACUTE RENAL FAILURE SUPERIMPOSED ON STAGE 3A CHRONIC KIDNEY DISEASE (HCC): Status: ACTIVE | Noted: 2021-11-19

## 2021-11-19 PROBLEM — N17.9 ACUTE RENAL FAILURE SUPERIMPOSED ON STAGE 3A CHRONIC KIDNEY DISEASE (HCC): Status: ACTIVE | Noted: 2021-11-19

## 2021-11-19 PROBLEM — I35.0 NONRHEUMATIC AORTIC VALVE STENOSIS: Status: ACTIVE | Noted: 2021-11-19

## 2021-11-19 LAB
ALBUMIN SERPL BCP-MCNC: 4.4 G/DL (ref 3.2–4.9)
ALBUMIN/GLOB SERPL: 1.3 G/DL
ALP SERPL-CCNC: 82 U/L (ref 30–99)
ALT SERPL-CCNC: 16 U/L (ref 2–50)
ANION GAP SERPL CALC-SCNC: 14 MMOL/L (ref 7–16)
AST SERPL-CCNC: 18 U/L (ref 12–45)
BASOPHILS # BLD AUTO: 1 % (ref 0–1.8)
BASOPHILS # BLD: 0.07 K/UL (ref 0–0.12)
BILIRUB SERPL-MCNC: 0.4 MG/DL (ref 0.1–1.5)
BUN SERPL-MCNC: 33 MG/DL (ref 8–22)
CALCIUM SERPL-MCNC: 10.5 MG/DL (ref 8.5–10.5)
CHLORIDE SERPL-SCNC: 102 MMOL/L (ref 96–112)
CO2 SERPL-SCNC: 23 MMOL/L (ref 20–33)
CREAT SERPL-MCNC: 1.65 MG/DL (ref 0.5–1.4)
EKG IMPRESSION: NORMAL
EKG IMPRESSION: NORMAL
EOSINOPHIL # BLD AUTO: 0.29 K/UL (ref 0–0.51)
EOSINOPHIL NFR BLD: 4.2 % (ref 0–6.9)
ERYTHROCYTE [DISTWIDTH] IN BLOOD BY AUTOMATED COUNT: 51.1 FL (ref 35.9–50)
GLOBULIN SER CALC-MCNC: 3.5 G/DL (ref 1.9–3.5)
GLUCOSE SERPL-MCNC: 149 MG/DL (ref 65–99)
HCT VFR BLD AUTO: 46.9 % (ref 42–52)
HGB BLD-MCNC: 15.7 G/DL (ref 14–18)
IMM GRANULOCYTES # BLD AUTO: 0.04 K/UL (ref 0–0.11)
IMM GRANULOCYTES NFR BLD AUTO: 0.6 % (ref 0–0.9)
LYMPHOCYTES # BLD AUTO: 1.34 K/UL (ref 1–4.8)
LYMPHOCYTES NFR BLD: 19.5 % (ref 22–41)
MCH RBC QN AUTO: 33.4 PG (ref 27–33)
MCHC RBC AUTO-ENTMCNC: 33.5 G/DL (ref 33.7–35.3)
MCV RBC AUTO: 99.8 FL (ref 81.4–97.8)
MONOCYTES # BLD AUTO: 0.69 K/UL (ref 0–0.85)
MONOCYTES NFR BLD AUTO: 10 % (ref 0–13.4)
NEUTROPHILS # BLD AUTO: 4.44 K/UL (ref 1.82–7.42)
NEUTROPHILS NFR BLD: 64.7 % (ref 44–72)
NRBC # BLD AUTO: 0 K/UL
NRBC BLD-RTO: 0 /100 WBC
NT-PROBNP SERPL IA-MCNC: 412 PG/ML (ref 0–125)
PLATELET # BLD AUTO: 235 K/UL (ref 164–446)
PMV BLD AUTO: 9.7 FL (ref 9–12.9)
POTASSIUM SERPL-SCNC: 3.9 MMOL/L (ref 3.6–5.5)
PROT SERPL-MCNC: 7.9 G/DL (ref 6–8.2)
RBC # BLD AUTO: 4.7 M/UL (ref 4.7–6.1)
SODIUM SERPL-SCNC: 139 MMOL/L (ref 135–145)
TROPONIN T SERPL-MCNC: 23 NG/L (ref 6–19)
WBC # BLD AUTO: 6.9 K/UL (ref 4.8–10.8)

## 2021-11-19 PROCEDURE — 94799 UNLISTED PULMONARY SVC/PX: CPT

## 2021-11-19 PROCEDURE — 700102 HCHG RX REV CODE 250 W/ 637 OVERRIDE(OP): Performed by: STUDENT IN AN ORGANIZED HEALTH CARE EDUCATION/TRAINING PROGRAM

## 2021-11-19 PROCEDURE — 96374 THER/PROPH/DIAG INJ IV PUSH: CPT

## 2021-11-19 PROCEDURE — G0378 HOSPITAL OBSERVATION PER HR: HCPCS

## 2021-11-19 PROCEDURE — 99220 PR INITIAL OBSERVATION CARE,LEVL III: CPT | Performed by: STUDENT IN AN ORGANIZED HEALTH CARE EDUCATION/TRAINING PROGRAM

## 2021-11-19 PROCEDURE — 99285 EMERGENCY DEPT VISIT HI MDM: CPT

## 2021-11-19 PROCEDURE — 700101 HCHG RX REV CODE 250

## 2021-11-19 PROCEDURE — 93005 ELECTROCARDIOGRAM TRACING: CPT | Performed by: EMERGENCY MEDICINE

## 2021-11-19 PROCEDURE — 80053 COMPREHEN METABOLIC PANEL: CPT

## 2021-11-19 PROCEDURE — 83880 ASSAY OF NATRIURETIC PEPTIDE: CPT

## 2021-11-19 PROCEDURE — 93000 ELECTROCARDIOGRAM COMPLETE: CPT | Performed by: INTERNAL MEDICINE

## 2021-11-19 PROCEDURE — 85025 COMPLETE CBC W/AUTO DIFF WBC: CPT

## 2021-11-19 PROCEDURE — 99999 PR NO CHARGE: CPT | Performed by: INTERNAL MEDICINE

## 2021-11-19 PROCEDURE — 84484 ASSAY OF TROPONIN QUANT: CPT

## 2021-11-19 PROCEDURE — A9270 NON-COVERED ITEM OR SERVICE: HCPCS | Performed by: STUDENT IN AN ORGANIZED HEALTH CARE EDUCATION/TRAINING PROGRAM

## 2021-11-19 PROCEDURE — 71045 X-RAY EXAM CHEST 1 VIEW: CPT

## 2021-11-19 RX ORDER — KETAMINE HYDROCHLORIDE 50 MG/ML
INJECTION, SOLUTION INTRAMUSCULAR; INTRAVENOUS
Status: COMPLETED
Start: 2021-11-19 | End: 2021-11-19

## 2021-11-19 RX ORDER — METOPROLOL SUCCINATE 50 MG/1
150 TABLET, EXTENDED RELEASE ORAL DAILY
Status: DISCONTINUED | OUTPATIENT
Start: 2021-11-20 | End: 2021-11-20

## 2021-11-19 RX ORDER — LABETALOL HYDROCHLORIDE 5 MG/ML
10 INJECTION, SOLUTION INTRAVENOUS EVERY 4 HOURS PRN
Status: DISCONTINUED | OUTPATIENT
Start: 2021-11-19 | End: 2021-11-20 | Stop reason: HOSPADM

## 2021-11-19 RX ORDER — POLYETHYLENE GLYCOL 3350 17 G/17G
1 POWDER, FOR SOLUTION ORAL
Status: DISCONTINUED | OUTPATIENT
Start: 2021-11-19 | End: 2021-11-20 | Stop reason: HOSPADM

## 2021-11-19 RX ORDER — ONDANSETRON 2 MG/ML
4 INJECTION INTRAMUSCULAR; INTRAVENOUS EVERY 4 HOURS PRN
Status: DISCONTINUED | OUTPATIENT
Start: 2021-11-19 | End: 2021-11-19

## 2021-11-19 RX ORDER — BISACODYL 10 MG
10 SUPPOSITORY, RECTAL RECTAL
Status: DISCONTINUED | OUTPATIENT
Start: 2021-11-19 | End: 2021-11-20 | Stop reason: HOSPADM

## 2021-11-19 RX ORDER — CLOPIDOGREL BISULFATE 75 MG/1
75 TABLET ORAL DAILY
Status: DISCONTINUED | OUTPATIENT
Start: 2021-11-20 | End: 2021-11-20 | Stop reason: HOSPADM

## 2021-11-19 RX ORDER — PROPOFOL 10 MG/ML
1 INJECTION, EMULSION INTRAVENOUS ONCE
Status: DISPENSED | OUTPATIENT
Start: 2021-11-19 | End: 2021-11-20

## 2021-11-19 RX ORDER — KETAMINE HYDROCHLORIDE 50 MG/ML
0.5 INJECTION, SOLUTION INTRAMUSCULAR; INTRAVENOUS ONCE
Status: COMPLETED | OUTPATIENT
Start: 2021-11-19 | End: 2021-11-19

## 2021-11-19 RX ORDER — SPIRONOLACTONE 25 MG/1
25 TABLET ORAL DAILY
Status: DISCONTINUED | OUTPATIENT
Start: 2021-11-20 | End: 2021-11-20 | Stop reason: HOSPADM

## 2021-11-19 RX ORDER — AMIODARONE HYDROCHLORIDE 200 MG/1
200 TABLET ORAL DAILY
Status: DISCONTINUED | OUTPATIENT
Start: 2021-11-19 | End: 2021-11-20 | Stop reason: HOSPADM

## 2021-11-19 RX ORDER — ONDANSETRON 4 MG/1
4 TABLET, ORALLY DISINTEGRATING ORAL EVERY 4 HOURS PRN
Status: DISCONTINUED | OUTPATIENT
Start: 2021-11-19 | End: 2021-11-19

## 2021-11-19 RX ORDER — ACETAMINOPHEN 325 MG/1
650 TABLET ORAL EVERY 6 HOURS PRN
Status: DISCONTINUED | OUTPATIENT
Start: 2021-11-19 | End: 2021-11-20 | Stop reason: HOSPADM

## 2021-11-19 RX ORDER — ATORVASTATIN CALCIUM 40 MG/1
40 TABLET, FILM COATED ORAL DAILY
Status: DISCONTINUED | OUTPATIENT
Start: 2021-11-20 | End: 2021-11-20 | Stop reason: HOSPADM

## 2021-11-19 RX ADMIN — APIXABAN 5 MG: 5 TABLET, FILM COATED ORAL at 19:52

## 2021-11-19 RX ADMIN — KETAMINE HYDROCHLORIDE 38 MG: 50 INJECTION, SOLUTION INTRAMUSCULAR; INTRAVENOUS at 16:52

## 2021-11-19 RX ADMIN — AMIODARONE HYDROCHLORIDE 200 MG: 200 TABLET ORAL at 19:52

## 2021-11-19 RX ADMIN — KETAMINE HYDROCHLORIDE 38 MG: 50 INJECTION INTRAMUSCULAR; INTRAVENOUS at 16:52

## 2021-11-19 ASSESSMENT — CHA2DS2 SCORE
DIABETES: NO
CHA2DS2 VASC SCORE: 4
CHF OR LEFT VENTRICULAR DYSFUNCTION: YES
SEX: MALE
AGE 65 TO 74: NO
AGE 75 OR GREATER: YES
PRIOR STROKE OR TIA OR THROMBOEMBOLISM: NO
VASCULAR DISEASE: NO
HYPERTENSION: YES

## 2021-11-19 ASSESSMENT — ENCOUNTER SYMPTOMS
COUGH: 0
VOMITING: 0
SHORTNESS OF BREATH: 0
ABDOMINAL PAIN: 0
CHILLS: 0
PALPITATIONS: 1
NAUSEA: 0
FEVER: 0

## 2021-11-19 ASSESSMENT — FIBROSIS 4 INDEX
FIB4 SCORE: 1.88
FIB4 SCORE: 1.49

## 2021-11-19 ASSESSMENT — PAIN DESCRIPTION - PAIN TYPE: TYPE: ACUTE PAIN

## 2021-11-19 NOTE — Clinical Note
EKG was completed and given to Duarte RAMOS for JG. Per Duarte rodriguez to order EKG under JG. Paper copy given to Duarte RAMOS for JS. RN to go into room with patient to review.

## 2021-11-19 NOTE — ED TRIAGE NOTES
"Pt presents to ED via EMS with complaint of dizziness and being unsteady on his feet. EMS states pt had presented to [facility] cardiology clinic with these complaints. EKG performed there. Pt denies pain or discomfort. Denies shortness of air. EMS reports pt was supposed to have had \"cardioversion\" this week but due to phone communication issues this did not occur. PT states he believes he tood amiodarone as prescribed this am. Speaking without signs of distress. EMS reports pt was noted to have low blood pressure at cardiology clinic.   "

## 2021-11-19 NOTE — NON-PROVIDER
Patient arrived as walk in to discuss amiodarone keeping him awake for last 30 days.    Pt has been taking it since June after hospitalization. Went off all medication for a few days and just took Amio and stated symptoms got worse. Pt has now been off Amio for 3 days.     CC of feeling short of breath walking into office. Palpation of pulse does feel irregular. Moved into non-provider for EKG to confirm rhythm.

## 2021-11-19 NOTE — NON-PROVIDER
"After extensive chart review, pt was supposed to be scheduled for an outpatient cardioversion but he has no phone and they were unable to schedule. D/W SC who reviewed EKG and pt's symptoms., noted as A-flutter, symptomatic advised ER. Patient stated very dizzy walking short distances. ZULEIMA called.     Report provided to ZULEIMA, all patient belongings went with ZULEIMA, ER Charge notified.     Patient unable to confirm what medication he took today.     \"Was off all but amio for some days and then he stated he went off amio for 3 days\" but thinks he took it today but unsure about anything else.   "

## 2021-11-20 VITALS
TEMPERATURE: 98.2 F | HEART RATE: 64 BPM | SYSTOLIC BLOOD PRESSURE: 108 MMHG | DIASTOLIC BLOOD PRESSURE: 73 MMHG | WEIGHT: 175.93 LBS | BODY MASS INDEX: 25.19 KG/M2 | HEIGHT: 70 IN | OXYGEN SATURATION: 97 % | RESPIRATION RATE: 16 BRPM

## 2021-11-20 PROBLEM — Z02.9 DISCHARGE PLANNING ISSUES: Status: RESOLVED | Noted: 2021-11-20 | Resolved: 2021-11-20

## 2021-11-20 PROBLEM — Z75.8 DISCHARGE PLANNING ISSUES: Status: ACTIVE | Noted: 2021-11-20

## 2021-11-20 PROBLEM — Z75.8 DISCHARGE PLANNING ISSUES: Status: RESOLVED | Noted: 2021-11-20 | Resolved: 2021-11-20

## 2021-11-20 PROBLEM — Z02.9 DISCHARGE PLANNING ISSUES: Status: ACTIVE | Noted: 2021-11-20

## 2021-11-20 LAB
ANION GAP SERPL CALC-SCNC: 12 MMOL/L (ref 7–16)
BASOPHILS # BLD AUTO: 0.9 % (ref 0–1.8)
BASOPHILS # BLD: 0.06 K/UL (ref 0–0.12)
BUN SERPL-MCNC: 27 MG/DL (ref 8–22)
CALCIUM SERPL-MCNC: 9 MG/DL (ref 8.5–10.5)
CHLORIDE SERPL-SCNC: 105 MMOL/L (ref 96–112)
CO2 SERPL-SCNC: 23 MMOL/L (ref 20–33)
CREAT SERPL-MCNC: 1.33 MG/DL (ref 0.5–1.4)
EOSINOPHIL # BLD AUTO: 0.22 K/UL (ref 0–0.51)
EOSINOPHIL NFR BLD: 3.5 % (ref 0–6.9)
ERYTHROCYTE [DISTWIDTH] IN BLOOD BY AUTOMATED COUNT: 50.6 FL (ref 35.9–50)
GLUCOSE SERPL-MCNC: 86 MG/DL (ref 65–99)
HCT VFR BLD AUTO: 36.5 % (ref 42–52)
HGB BLD-MCNC: 12.6 G/DL (ref 14–18)
IMM GRANULOCYTES # BLD AUTO: 0.02 K/UL (ref 0–0.11)
IMM GRANULOCYTES NFR BLD AUTO: 0.3 % (ref 0–0.9)
LYMPHOCYTES # BLD AUTO: 1.15 K/UL (ref 1–4.8)
LYMPHOCYTES NFR BLD: 18.2 % (ref 22–41)
MCH RBC QN AUTO: 33.9 PG (ref 27–33)
MCHC RBC AUTO-ENTMCNC: 34.5 G/DL (ref 33.7–35.3)
MCV RBC AUTO: 98.1 FL (ref 81.4–97.8)
MONOCYTES # BLD AUTO: 0.77 K/UL (ref 0–0.85)
MONOCYTES NFR BLD AUTO: 12.2 % (ref 0–13.4)
NEUTROPHILS # BLD AUTO: 4.1 K/UL (ref 1.82–7.42)
NEUTROPHILS NFR BLD: 64.9 % (ref 44–72)
NRBC # BLD AUTO: 0 K/UL
NRBC BLD-RTO: 0 /100 WBC
PLATELET # BLD AUTO: 182 K/UL (ref 164–446)
PMV BLD AUTO: 9.3 FL (ref 9–12.9)
POTASSIUM SERPL-SCNC: 4.1 MMOL/L (ref 3.6–5.5)
RBC # BLD AUTO: 3.72 M/UL (ref 4.7–6.1)
SODIUM SERPL-SCNC: 140 MMOL/L (ref 135–145)
WBC # BLD AUTO: 6.3 K/UL (ref 4.8–10.8)

## 2021-11-20 PROCEDURE — 99217 PR OBSERVATION CARE DISCHARGE: CPT | Performed by: HOSPITALIST

## 2021-11-20 PROCEDURE — A9270 NON-COVERED ITEM OR SERVICE: HCPCS | Performed by: STUDENT IN AN ORGANIZED HEALTH CARE EDUCATION/TRAINING PROGRAM

## 2021-11-20 PROCEDURE — 99214 OFFICE O/P EST MOD 30 MIN: CPT | Performed by: INTERNAL MEDICINE

## 2021-11-20 PROCEDURE — 80048 BASIC METABOLIC PNL TOTAL CA: CPT

## 2021-11-20 PROCEDURE — 700102 HCHG RX REV CODE 250 W/ 637 OVERRIDE(OP): Performed by: STUDENT IN AN ORGANIZED HEALTH CARE EDUCATION/TRAINING PROGRAM

## 2021-11-20 PROCEDURE — G0378 HOSPITAL OBSERVATION PER HR: HCPCS

## 2021-11-20 PROCEDURE — 700105 HCHG RX REV CODE 258: Performed by: NURSE PRACTITIONER

## 2021-11-20 PROCEDURE — 85025 COMPLETE CBC W/AUTO DIFF WBC: CPT

## 2021-11-20 RX ORDER — METOPROLOL SUCCINATE 25 MG/1
50 TABLET, EXTENDED RELEASE ORAL DAILY
Status: DISCONTINUED | OUTPATIENT
Start: 2021-11-21 | End: 2021-11-20 | Stop reason: HOSPADM

## 2021-11-20 RX ORDER — SODIUM CHLORIDE 9 MG/ML
INJECTION, SOLUTION INTRAVENOUS CONTINUOUS
Status: ACTIVE | OUTPATIENT
Start: 2021-11-20 | End: 2021-11-20

## 2021-11-20 RX ADMIN — SPIRONOLACTONE 25 MG: 25 TABLET ORAL at 05:19

## 2021-11-20 RX ADMIN — METOPROLOL SUCCINATE 150 MG: 50 TABLET, EXTENDED RELEASE ORAL at 05:19

## 2021-11-20 RX ADMIN — SODIUM CHLORIDE: 9 INJECTION, SOLUTION INTRAVENOUS at 10:34

## 2021-11-20 RX ADMIN — CLOPIDOGREL BISULFATE 75 MG: 75 TABLET ORAL at 05:19

## 2021-11-20 RX ADMIN — ATORVASTATIN CALCIUM 40 MG: 40 TABLET, FILM COATED ORAL at 05:18

## 2021-11-20 RX ADMIN — APIXABAN 5 MG: 5 TABLET, FILM COATED ORAL at 05:19

## 2021-11-20 ASSESSMENT — ENCOUNTER SYMPTOMS
NECK PAIN: 0
ABDOMINAL PAIN: 0
CHILLS: 0
VOMITING: 0
APNEA: 0
COUGH: 0
BLURRED VISION: 0
DIARRHEA: 0
FOCAL WEAKNESS: 0
PALPITATIONS: 1
DOUBLE VISION: 0
CHEST TIGHTNESS: 0
SHORTNESS OF BREATH: 0
DIZZINESS: 0
MYALGIAS: 0
SHORTNESS OF BREATH: 1
HEADACHES: 0
STRIDOR: 0
FEVER: 0
CHOKING: 0
DEPRESSION: 0
NAUSEA: 0

## 2021-11-20 NOTE — ASSESSMENT & PLAN NOTE
History of CKD 3  Creatinine on admission 1.65 now down to 1.33  Patient appears dry on exam with dry mucous membranes and poor skin turgor.  Last EF 40,% gentle hydration with 250 mL of NS

## 2021-11-20 NOTE — ED PROVIDER NOTES
ED Provider Note    ED Provider Note    Primary care provider: Pcp Pt States None  Means of arrival: EMS  History obtained from: Patient    CHIEF COMPLAINT  Chief Complaint   Patient presents with   • Dizziness     from cardiology clinic wtih Aflutter. denies pain. Denies shortness or air.      Seen at 4:02 PM.   HPI  Rajesh De Luna is a 76 y.o. male who presents to the Emergency Department for difficulty sleeping.  The patient normally sleeps 7 to 8 hours a night, he states lately he has been only sleeping for 3 or 4 hours, he wakes up and then cannot fall back asleep.  He attributes it to one of his cardiac medications.  He walked into the cardiology clinic today to discuss this, they found that he was in a flutter and they sent him here to the emergency department.  Apparently at check-in at the cardiology clinic he had some episodes of hypotension.    The patient does get lightheaded when he stands up, but if he stands up for a few minutes it goes away.  He has had some dyspnea on exertion for the past month or so.  He denies any PND.  He denies any chest pain, headache, fevers, chills, cough, numbness or focal weakness.  He has been compliant with his Eliquis, twice daily and denies missing any doses.    REVIEW OF SYSTEMS  See HPI,   Remainder of ROS negative.     PAST MEDICAL HISTORY   has a past medical history of A-fib ([facility]), Anxiety, Breath shortness, Cancer ([facility]) (2018), Dental disorder, Depression, Heart burn, High cholesterol, History of chronic cough, Hypertension, and Urinary incontinence.    SURGICAL HISTORY   has a past surgical history that includes other surgical procedure (02/2018) and cardiac cath, left heart.    SOCIAL HISTORY  Social History     Tobacco Use   • Smoking status: Current Some Day Smoker     Types: Cigars   • Smokeless tobacco: Never Used   • Tobacco comment: reports Cigars, 3 per day   Vaping Use   • Vaping Use: Never used   Substance Use Topics   • Alcohol use: Yes      "Comment: reports 2/day   • Drug use: Not Currently      Social History     Substance and Sexual Activity   Drug Use Not Currently       FAMILY HISTORY  No family history on file.    CURRENT MEDICATIONS  Reviewed.  See Encounter Summary.     ALLERGIES  Allergies   Allergen Reactions   • Penicillins Unspecified              PHYSICAL EXAM  VITAL SIGNS: /68   Pulse 88   Temp 36.2 °C (97.2 °F) (Temporal)   Resp 18   Ht 1.778 m (5' 10\")   Wt 76.2 kg (168 lb)   SpO2 94%   BMI 24.11 kg/m²   Constitutional: Awake, alert in no apparent distress.  HENT: Normocephalic, Bilateral external ears normal. Nose normal.   Eyes: Conjunctiva normal, non-icteric, EOMI.    Thorax & Lungs: Easy unlabored respirations, Clear to ascultation bilaterally.  Cardiovascular: Irregular rate, irregular rhythm, No murmurs, rubs or gallops. Bilateral pulses symmetrical.   Abdomen:  Soft, nontender, nondistended, normal active bowel sounds.   :    Skin: Visualized skin is  Dry, No erythema, No rash.   Musculoskeletal:   No cyanosis, clubbing or edema. No leg asymmetry.   Neurologic: Alert, Grossly non-focal.   Psychiatric: Normal affect, Normal mood  Lymphatic:  No cervical LAD    EKG   12 lead Interpreted by me  Rhythm: Atrial flutter rate: 99  Axis: normal  Ectopy: none  Conduction: normal  ST Segments: no acute change  T Waves: no acute change  Clinical Impression: A flutter, no acute ischemic changes     RADIOLOGY  DX-CHEST-PORTABLE (1 VIEW)   Final Result      No acute cardiopulmonary disease.            COURSE & MEDICAL DECISION MAKING  Pertinent Labs & Imaging studies reviewed. (See chart for details)        4:02 PM - Medical record reviewed, history of AMaggie l[facility]pez on [facility], underwent a LHC on 6/12 with stent to Lcx.    4:13 PM: Case discussed with Dr. Ruiz, cardiology, plan to do cardioversion.    Procedure note:  Informed consent was obtained.  The patient has been n.p.o. since yesterday evening.  He was monitored with end-tidal " CO2, nasal cannula in place, respiratory at bedside.  Because the patient has ASA category 3 with significant aortic stenosis, we opted to sedate him with ketamine rather than propofol.  He received 40 mg of IV ketamine with adequate analgesia.  The patient was cardioverted by Dr. Ruiz.  The patient did not have any adverse effects of the cardioversion, he made a full recovery without requiring any BVM and he had no periods of hypoventilation or hypoxia.  Please see nursing notes for total length of sedation, estimated 15 minutes.    5:40 PM: Case discussed with Dr. Sosa will evaluate the patient for admission/observation.      Decision Making:  This is a pleasant 76 y.o. year old male who presents with atrial flutter.  I am told the patient arrived to the cardiology clinic today diaphoretic and hypotensive.  He is well-appearing and hemodynamically stable in the ER.  He is in atrial flutter and has been compliant with Eliquis for the past several months.  This puts him at a low risk for acute CVA from cardioversion.  I discussed the case with cardiology who was in agreement.  The patient was cardioverted as above and has returned normal sinus rhythm.  After further discussion with cardiology, given that the patient was in extremis earlier today and he has significant comorbidities they would prefer the patient be watched overnight.  Case discussed with Dr. Sosa who will evaluate the patient for overnight observation.      FINAL IMPRESSION  1. Typical atrial flutter (HCC)    2. Current use of long term anticoagulation    3. Postural dizziness

## 2021-11-20 NOTE — DISCHARGE PLANNING
ADDENDUM  11/20/21  1320  Lsw contacted Sandra with the scheduling office. The patient has [facility] 2021 [facility] [address] [facility] Lsw notified the medical team.    Care Transition Team Discharge Planning    Anticipated Discharge Disposition: DC home with help.    Action: Lsw spoke with patient to obtain HH choices. The patient selected [facility]. Lsw faxed CHOICE form to [facility].    Lsw completed CTT assessment. Patient presented A&Ox4 e/b being able to confirm information on facesheet as being accurate. He reported that he lives on the 2nd floor and has to climb 15 steps to his apartment entrance. The patient stated that he no longer has a PCP because he has not been able to find one yet. He does not have a home phone due to not being able to afford the cost.    The patient stated that he lives alone in the [facility] apartments. The patient reported being independent with all ADLs and IADLs. He stated that he does not have a car; therefore, he does not drive.     Barriers to Discharge: Awaiting HH acceptance, no PCP, no telephone.    Plan: Lsw will assist medical team with DC planning.    Care Transition Team Assessment    Information Source  Orientation Level: Oriented X4  Information Given By: Patient  Informant's Name: Rajesh De Luna  Who is responsible for making decisions for patient? : Patient    Readmission Evaluation  Is this a readmission?: Yes - unplanned readmission  Why do you think you were readmitted?: poss heart failure.  Was an appointment arranged for you prior to discharge?: No  Were there new prescriptions you were supposed to fill after you were discharged?: No  Did you understand your discharge instructions?: Yes  Did you have enough support after your last discharge?: Yes              Discharge Preparedness  What is your plan after discharge?: Home with help  What are your discharge supports?: Other (comment) (antonietta)  Prior  Functional Level: Ambulatory,Independent with Activities of Daily Living,Independent with Medication Management  Difficulity with ADLs: None  Difficulity with IADLs: None    Functional Assesment  Prior Functional Level: Ambulatory,Independent with Activities of Daily Living,Independent with Medication Management    Finances  Financial Barriers to Discharge: No  Prescription Coverage: Yes              Advance Directive  Advance Directive?: None         Psychological Assessment  History of Substance Abuse: Alcohol (occassionally)  Date Last Used - Alcohol: 3 days ago -2 beers  Substance Abuse Comments:  (drinks beer)  History of Psychiatric Problems: Yes (was in [facility] - 3[facility] for depression and anxiety.)  Non-compliant with Treatment: No  Newly Diagnosed Illness: Yes    Discharge Risks or Barriers  Discharge risks or barriers?: No PCP  Patient risk factors: Readmission,No PCP    Anticipated Discharge Information  Discharge Disposition: Discharged to home/self care (01)  Discharge Address: [facility] [address] , [place] [address]  Discharge Contact Phone Number: [phone]

## 2021-11-20 NOTE — FACE TO FACE
Face to Face Supporting Documentation - Home Health    The encounter with this patient was in whole or in part the primary reason for home health admission.    Date of encounter:   Patient:                    MRN:                       YOB: 2023  Rajesh De Luna  [id number]  1945     Home health to see patient for:  Skilled Nursing care for assessment, interventions & education    Skilled need for:  Medication Management cardiac medication mangemnet    Skilled nursing interventions to include:  Comment: Medication management, self care    Homebound status evidenced by:  Needs the assistance of another person in order to leave the home. Leaving home requires a considerable and taxing effort. There is a normal inability to leave the home.    Community Physician to provide follow up care: Pcp Pt States None     Optional Interventions? No      I certify the face to face encounter for this home health care referral meets the CMS requirements and the encounter/clinical assessment with the patient was, in whole, or in part, for the medical condition(s) listed above, which is the primary reason for home health care. Based on my clinical findings: the service(s) are medically necessary, support the need for home health care, and the homebound criteria are met.  I certify that this patient has had a face to face encounter by myself.  LEXA Hathaway. - NPI: [id number]

## 2021-11-20 NOTE — PROGRESS NOTES
I have received report from day shift RN. I have assumed care of this patient. Patient has been assessed (see flow sheet) and plan of care has been discussed. Patient verbalizes understanding of plan and denies any further needs at this time. Patient is now resting in bed, bed is in the lowest position and locked, and the call light is within reach.     COVID-19 surge in effect

## 2021-11-20 NOTE — ASSESSMENT & PLAN NOTE
Patient has a history of moderate to severe aortic stenosis  He was being followed by the TAVR team and was scheduled for surgery earlier this month.  The surgery is not done it is unclear why cardiology reaching out to TAVR team to follow-up on this.  It is unclear if he is a candidate at this time or not.  Patient verbalizing he does not want a TAVR.   Palliative care consult

## 2021-11-20 NOTE — ASSESSMENT & PLAN NOTE
History of atrial fibrillation, rapid ventricular rate on admission  Status post successful cardioversion 11/19/2021  Telemetry monitoring  Continue home medication metoprolol and amiodarone  Continue Eliquis for anticoagulation  Cardiology following

## 2021-11-20 NOTE — PROGRESS NOTES
Cardiology Follow Up Progress Note    Date of Service  11/20/2021    Attending Physician  Dominic Moran M.D.    Cardiology consultation for A. fib/flutter underwent successful DCCV 11/19/2021, in addition moderate to severe aortic stenosis      PMH: Atrial fibrillation on Eliquis previous unsuccessful DCCV 6/12/2021, CAD with PCI/DONOVAN to left circumflex, hyperlipidemia, hypertension, alcohol use, cardiomyopathy, moderate to severe aortic stenosis      Interim Events    Maintaining sinus rhythm on amiodarone  BP appropriate  Labs reviewed  NA, K, CR stable   Unclear if patient is a candidate for TAVR although he has been seen recently in outpatient valve clinic , will reach out to our colleagues in the valve clinic.    Review of Systems  Review of Systems   Respiratory: Negative for apnea, cough, choking, chest tightness, shortness of breath and stridor.        Vital signs in last 24 hours  Temp:  [36 °C (96.8 °F)-36.9 °C (98.4 °F)] 36.8 °C (98.3 °F)  Pulse:  [77-99] 79  Resp:  [16-20] 18  BP: ()/() 105/70  SpO2:  [91 %-99 %] 95 %    Physical Exam  Physical Exam  Cardiovascular:      Rate and Rhythm: Normal rate and regular rhythm.      Pulses: Normal pulses.      Heart sounds: Murmur heard.    Systolic murmur is present.      Pulmonary:      Breath sounds: No wheezing.   Skin:     General: Skin is warm.   Neurological:      Mental Status: He is alert. Mental status is at baseline.   Psychiatric:         Mood and Affect: Mood normal.         Lab Review  Lab Results   Component Value Date/Time    WBC 6.3 11/20/2021 12:43 AM    RBC 3.72 (L) 11/20/2021 12:43 AM    HEMOGLOBIN 12.6 (L) 11/20/2021 12:43 AM    HEMATOCRIT 36.5 (L) 11/20/2021 12:43 AM    MCV 98.1 (H) 11/20/2021 12:43 AM    MCH 33.9 (H) 11/20/2021 12:43 AM    MCHC 34.5 11/20/2021 12:43 AM    MPV 9.3 11/20/2021 12:43 AM      Lab Results   Component Value Date/Time    SODIUM 140 11/20/2021 12:43 AM    POTASSIUM 4.1 11/20/2021 12:43 AM    CHLORIDE  105 11/20/2021 12:43 AM    CO2 23 11/20/2021 12:43 AM    GLUCOSE 86 11/20/2021 12:43 AM    BUN 27 (H) 11/20/2021 12:43 AM    CREATININE 1.33 11/20/2021 12:43 AM      Lab Results   Component Value Date/Time    ASTSGOT 18 11/19/2021 03:36 PM    ALTSGPT 16 11/19/2021 03:36 PM     Lab Results   Component Value Date/Time    CHOLSTRLTOT 108 06/13/2021 02:35 AM    LDL 53 06/13/2021 02:35 AM    HDL 40 06/13/2021 02:35 AM    TRIGLYCERIDE 75 06/13/2021 02:35 AM    TROPONINT 23 (H) 11/19/2021 03:36 PM       Recent Labs     11/19/21  1536   NTPROBNP 412*       Cardiac Imaging and Procedures Review  EKG:  SR  11/19/21    Echocardiogram:  11/11/21  the aortic valve   gradient is unchanged.  The EF has mildly improved.  The left ventricular ejection fraction is visually estimated to be 40%.  Possible low flow, low gradient severe aortic stenosis.       Cardiac Catheterization:    6/12/2021  Left Main: Large caliber bifurcating no CAD.  Left Anterior Descending: Large caliber giving rise to several large diagonals.  There is a heavily calcified 50% proximal stenosis.  Left Circumflex: Moderate caliber supplying several obtuse marginals and lateral branch.  The mid circumflex is notable for 99% focal culprit stenosis.  Post PCI there is 0% residual stenosis and ALEC-3 flow.  Right Coronary: Large caliber and dominant vessel with a 50% proximal focal stenosis.  Supplies RPDA and RPL B.  The RPDA is notable for a 60% distal stenosis.        Imaging  Chest X-Ray: No acute cardiopulmonary disease    Dobutamine stress test 9/29/2021  -Moderate to severe aortic stenosis at the peak V-max 3.8M/S, valve area of 0.9 cm².    Assessment/Plan    Moderate to severe aortic stenosis  Peak V-max 3.8M/S, valve area 0.9 cm² by DST on 9/29/2021  -Previously scheduled for TAVR on 11/15/2021 was canceled, etiology not entirely clear, will reach out to our colleagues in valve clinic.    Known cardiomyopathy, LVEF 25-30 (6/2/2021)  -Status post PCI/DONOVAN x1  left circumflex 6/2021.  -Continue with Plavix 75, atorvastatin 40, Toprol-XL 50, spironolactone 25 mg  -    PAF  History of A. fib with previous unsuccessful DCCV in June/2021  -Underwent successful DCCV, 11/19/21  -Continue with amiodarone 200 mg daily  -Continue with Eliquis 5 mg twice daily  -Currently on Toprol-, reportedly hypotension, dizziness lightheadedness, reduce dose to 50 mg daily    Hypertension   -/67  -Reportedly lightheadedness and dizziness upon standing, episodes of hypotension likely in the setting of severe aortic stenosis, reduce Toprol-XL to 50 mg daily.    Hyperlipidemia  -Continue atorvastatin  -LDL 53, 6/13/2021    Cardiology will follow along    Thank you for allowing me to participate in the care of this patient.      Please contact me with any questions.    LAUREN De La Torre.   Cardiologist, [facility]  [phone]

## 2021-11-20 NOTE — ED NOTES
RT, Cardiologist, ED physician at bedside. Pt awake, oriented, denies pain, speaking without signs of distress.

## 2021-11-20 NOTE — CARE PLAN
The patient is Stable - Low risk of patient condition declining or worsening         Progress made toward(s) clinical / shift goals:    Problem: Knowledge Deficit - Standard  Goal: Patient and family/care givers will demonstrate understanding of plan of care, disease process/condition, diagnostic tests and medications  Outcome: Progressing     Problem: Hemodynamics  Goal: Patient's hemodynamics, fluid balance and neurologic status will be stable or improve  Outcome: Progressing       Patient is not progressing towards the following goals:

## 2021-11-20 NOTE — FLOWSHEET NOTE
Sedation       11/19/21 1700   Vital Signs   Pulse 91   Respiration 19   Pulse Oximetry 99 %   CO2 Monitoring   ETCO2 (mmHg) 24   $ ETCO2 Monitoring Yes   Oxygen   O2 (LPM) 2   O2 Delivery Device Silicone Nasal Cannula

## 2021-11-20 NOTE — ASSESSMENT & PLAN NOTE
History of atrial fibrillation, rapid ventricular rate on admission  Status post cardioversion 11/19/2021  Continue home medication metoprolol and amiodarone  Continue Eliquis for anticoagulation  Cardiology following

## 2021-11-20 NOTE — ASSESSMENT & PLAN NOTE
Currently patient lives in senior living apartment. He does not have a phone.  Per cardiology patient has missed clinic appointments in the past and they are concerned with his ability to care for himself and follow-up appropriately.  Social work consulted for discharge planning.  Social work working on setting patient up with PCP.  Home health ordered

## 2021-11-20 NOTE — DISCHARGE PLANNING
Received Choice form at 1126  Agency/Facility Name: [facility]  Referral sent per Choice form @ 7507

## 2021-11-20 NOTE — ASSESSMENT & PLAN NOTE
Last echocardiogram from 11/11/2021 EF of 40%.  Continue home medications metoprolol, spironolactone  Cardiology following

## 2021-11-20 NOTE — PROGRESS NOTES
[facility] Medicine Daily Progress Note    Date of Service  11/20/2021    Chief Complaint  Dizziness    Hospital Course  Rajesh De Luna is a 76 y.o. male admitted 11/19/2021 with a history of atrial flutter on eliquis, oral cancer, and hypertension. He felt dizzy the morning of admission and presented to the cardiology clinic where he was diaphoretic, hypotensive, and in atrial fib/flutter. He was sent to the ED for successful DCCV. He has moderate to severe aortic stenosis for which he was scheduled for a TAVR earlier this month. Patient did not follow through with this procedure, unsure why. Patient admitted for observation and telemetry monitoring following cardioversion.      Interval Problem Update  Patient remains hemodynamically stable in SR. Discussed with cardiology and there is some question of patient compliance and ability to care for himself as he has missed cardiology appointments. Social work consult placed and attempt for home health set up prior to discharge. Hemoglobin down to 12.6 this AM, repeat CBC ordered.     I have personally seen and examined the patient at bedside. I discussed the plan of care with patient, bedside RN, charge RN and Dr. Moran.    Consultants/Specialty  cardiology    Code Status  Full Code    Disposition  Patient is medically cleared.   Anticipate discharge to to home with organized home healthcare and close outpatient follow-up.  I have placed the appropriate orders for post-discharge needs.    Review of Systems  Review of Systems   Constitutional: Negative for chills and fever.   HENT: Negative for congestion and hearing loss.    Eyes: Negative for blurred vision and double vision.   Respiratory: Positive for shortness of breath. Negative for cough.    Cardiovascular: Positive for chest pain and palpitations. Negative for leg swelling.   Gastrointestinal: Negative for abdominal pain, diarrhea, nausea and vomiting.   Genitourinary: Negative for frequency and urgency.    Musculoskeletal: Negative for myalgias and neck pain.   Skin: Negative.    Neurological: Negative for dizziness, focal weakness and headaches.   Psychiatric/Behavioral: Negative for depression and suicidal ideas.   All other systems reviewed and are negative.       Physical Exam  Temp:  [36 °C (96.8 °F)-36.9 °C (98.4 °F)] 36.6 °C (97.9 °F)  Pulse:  [72-99] 72  Resp:  [16-20] 18  BP: ()/() 102/67  SpO2:  [91 %-99 %] 95 %    Physical Exam  Vitals and nursing note reviewed.   Constitutional:       General: He is not in acute distress.     Appearance: Normal appearance. He is normal weight.   HENT:      Head: Normocephalic and atraumatic.      Right Ear: External ear normal.      Left Ear: External ear normal.      Nose: Nose normal. No congestion.      Mouth/Throat:      Mouth: Mucous membranes are dry.      Pharynx: Oropharynx is clear.   Eyes:      Extraocular Movements: Extraocular movements intact.      Conjunctiva/sclera: Conjunctivae normal.      Pupils: Pupils are equal, round, and reactive to light.   Cardiovascular:      Rate and Rhythm: Normal rate and regular rhythm.      Pulses: Normal pulses.      Heart sounds: Murmur heard.       Pulmonary:      Effort: Pulmonary effort is normal. No respiratory distress.      Breath sounds: Normal breath sounds. No wheezing.   Abdominal:      General: Abdomen is flat. Bowel sounds are normal. There is no distension.      Palpations: Abdomen is soft.   Musculoskeletal:         General: No swelling.      Cervical back: Normal range of motion.   Skin:     General: Skin is warm and dry.      Capillary Refill: Capillary refill takes less than 2 seconds.      Coloration: Skin is not jaundiced.   Neurological:      General: No focal deficit present.      Mental Status: He is alert and oriented to person, place, and time. Mental status is at baseline.   Psychiatric:         Mood and Affect: Mood normal.         Behavior: Behavior normal.         Thought Content:  Thought content normal.         Judgment: Judgment normal.         Fluids  No intake or output data in the 24 hours ending 11/20/21 1400    Laboratory  Recent Labs     11/19/21  1536 11/20/21  0043   WBC 6.9 6.3   RBC 4.70 3.72*   HEMOGLOBIN 15.7 12.6*   HEMATOCRIT 46.9 36.5*   MCV 99.8* 98.1*   MCH 33.4* 33.9*   MCHC 33.5* 34.5   RDW 51.1* 50.6*   PLATELETCT 235 182   MPV 9.7 9.3     Recent Labs     11/19/21  1536 11/20/21  0043   SODIUM 139 140   POTASSIUM 3.9 4.1   CHLORIDE 102 105   CO2 23 23   GLUCOSE 149* 86   BUN 33* 27*   CREATININE 1.65* 1.33   CALCIUM 10.5 9.0                   Imaging  DX-CHEST-PORTABLE (1 VIEW)   Final Result      No acute cardiopulmonary disease.           Assessment/Plan  * Atrial flutter (HCC)  Assessment & Plan  History of atrial fibrillation, rapid ventricular rate on admission  Status post successful cardioversion 11/19/2021  Telemetry monitoring  Continue home medication metoprolol and amiodarone  Continue Eliquis for anticoagulation  Cardiology following    Discharge planning issues  Assessment & Plan  Currently patient lives in senior living apartment. He does not have a phone.  Per cardiology patient has missed clinic appointments in the past and they are concerned with his ability to care for himself and follow-up appropriately.  Social work consulted for discharge planning.  Social work working on setting patient up with PCP.  Home health ordered    Acute renal failure superimposed on stage 3a chronic kidney disease (HCC)  Assessment & Plan  History of CKD 3  Creatinine on admission 1.65 now down to 1.33  Patient appears dry on exam with dry mucous membranes and poor skin turgor.  Last EF 40,% gentle hydration with 250 mL of NS      Nonrheumatic aortic valve stenosis  Assessment & Plan  Patient has a history of moderate to severe aortic stenosis  He was being followed by the TAVR team and was scheduled for surgery earlier this month.  The surgery is not done it is unclear why  cardiology reaching out to TAVR team to follow-up on this.  It is unclear if he is a candidate at this time or not.  Patient verbalizing he does not want a TAVR.   Palliative care consult      HFrEF- (present on admission)  Assessment & Plan  Last echocardiogram from 11/11/2021 EF of 40%.  Continue home medications metoprolol, spironolactone  Cardiology following    Hyperlipidemia- (present on admission)  Assessment & Plan  Continue home medication atorvastatin.   Last lipid panel 6/13/21 108/75/40/53       VTE prophylaxis: therapeutic anticoagulation with eliquis    I have performed a physical exam and reviewed and updated ROS and Plan today (11/20/2021). In review of yesterday's note (11/19/2021), there are no changes except as documented above.

## 2021-11-20 NOTE — HOSPITAL COURSE
Rajesh De Luna is a 76 y.o. male admitted 11/19/2021 with a history of atrial flutter on eliquis, oral cancer, and hypertension. He felt dizzy the morning of admission and presented to the cardiology clinic where he was diaphoretic, hypotensive, and in atrial fib/flutter. He was sent to the ED for successful DCCV. He has moderate to severe aortic stenosis for which he was scheduled for a TAVR earlier this month. Patient did not follow through with this procedure, unsure why. Patient admitted for observation and telemetry monitoring following cardioversion.

## 2021-11-20 NOTE — PROGRESS NOTES
4 Eyes Skin Assessment Completed by RICHARD Wiley and RICHARD Weeks.    Head WDL  Ears WDL  Nose WDL  Mouth WDL  Neck WDL  Breast/Chest WDL  Shoulder Blades WDL  Spine WDL  (R) Arm/Elbow/Hand WDL  (L) Arm/Elbow/Hand WDL  Abdomen WDL  Groin WDL  Scrotum/Coccyx/Buttocks WDL  (R) Leg WDL  (L) Leg WDL  (R) Heel/Foot/Toe WDL  (L) Heel/Foot/Toe WDL          Devices In Places ECG and Pulse Ox      Interventions In Place Pillows and Low Air Loss Mattress    Possible Skin Injury No    Pictures Uploaded Into Epic N/A  Wound Consult Placed N/A  RN Wound Prevention Protocol Ordered No

## 2021-11-20 NOTE — ED NOTES
PT awake, semi reclined in bed, speaking without signs of distress. A/Ox4. Denies pain. Denies shortness of air.

## 2021-11-20 NOTE — PROCEDURES
PROCEDURE IN DETAIL: The procedure was explained to the patient with risks and benefits including risk of stroke. The patient understands.  The patient has been compliant with his oral anticoagulation.  The pads were applied in the anterior and posterior approach. With synchronized biphasic waveform at 200J, one shock was successful in restoring sinus rhythm.  The patient had no immediate post-procedure complications. The rhythm was maintained and 12-lead EKG was requested.    IMPRESSION: Successful direct current cardioversion with restoration of sinus rhythm from atrial fibrillation with no immediate complication.

## 2021-11-20 NOTE — ED NOTES
Pharmacy Medication Reconciliation      ~Medication reconciliation updated and complete per pt at bedside & pt home pharmacy  ~Allergies have been verified and updated   ~No oral ABX within the last 30 days  ~Patient home pharmacy:[facility]

## 2021-11-20 NOTE — H&P
Hospital Medicine History & Physical Note    Date of Service  11/19/2021    Primary Care Physician  Pcp Pt States None    Consultants  cardiology    Specialist Names: Dr. Ruiz    Code Status  Prior    Chief Complaint  Chief Complaint   Patient presents with   • Dizziness     from cardiology clinic wtih Aflutter. denies pain. Denies shortness or air.        History of Presenting Illness  Rajesh De Luna is a 76 y.o. male who presented 11/19/2021 with a history of atrial flutter on Eliquis, oral cancer, hypertension who was admitted for dizziness and a flutter. Patient reports that he began not feeling well this morning. He reports he had some difficulty sleeping last night. Today he presented to the cardiology clinic to be evaluated they found that he was in a flutter and sent him to the emergency department. Also when he was at the cardiac clinic he had an episode of hypotension and reported not feeling well. Patient reports that prior to the cardioversion he had felt lightheaded when he stands up. He also reported dyspnea on exertion. Patient reports he has a history of atrial fibrillation and takes Eliquis and has not missed any doses.  In the ED patient was found to be in atrial flutter cardiology was consulted. Dr. Ruiz cardioverted the patient with 200 J. Patient is currently in sinus rhythm.    I discussed the plan of care with patient and bedside RN.    Review of Systems  Review of Systems   Constitutional: Positive for malaise/fatigue. Negative for chills and fever.   Respiratory: Negative for cough and shortness of breath.    Cardiovascular: Positive for palpitations. Negative for chest pain and leg swelling.   Gastrointestinal: Negative for abdominal pain, nausea and vomiting.   Genitourinary: Negative for dysuria.   All other systems reviewed and are negative.      Past Medical History   has a past medical history of A-fib ([facility]), Anxiety, Breath shortness, Cancer ([facility]) (2018), Dental disorder,  Depression, Heart burn, High cholesterol, History of chronic cough, Hypertension, and Urinary incontinence.    Surgical History   has a past surgical history that includes other surgical procedure (02/2018) and cardiac cath, left heart.     Family History  Father had heart attack and sister had cancer.  Family history reviewed with patient. There is no family history that is pertinent to the chief complaint.     Social History   reports that he has been smoking cigars. He has never used smokeless tobacco. He reports current alcohol use. He reports previous drug use.    Allergies  Allergies   Allergen Reactions   • Penicillins Unspecified              Medications  Prior to Admission Medications   Prescriptions Last Dose Informant Patient Reported? Taking?   amiodarone (CORDARONE) 200 MG Tab   No No   Sig: Take 1 Tablet by mouth every day.   apixaban (ELIQUIS) 5mg Tab  Patient No No   Sig: Take 1 tablet by mouth 2 times a day.   atorvastatin (LIPITOR) 40 MG Tab  Patient No No   Sig: Take 1 tablet by mouth every day.   Patient taking differently: Take 40 mg by mouth every day. Every other day   clopidogrel (PLAVIX) 75 MG Tab   No No   Sig: Take 1 tablet by mouth every day.   furosemide (LASIX) 20 MG Tab   No No   Sig: Take 1 Tablet by mouth 1 time a day as needed. as needed for weight gain greater than 3 pounds in 1 day or 5 pounds in 1 week.   metoprolol SR (TOPROL XL) 50 MG TABLET SR 24 HR   No No   Sig: Take 3 Tablets by mouth every day.   spironolactone (ALDACTONE) 25 MG Tab   No No   Sig: Take 1 Tablet by mouth every day.      Facility-Administered Medications: None       Physical Exam  Temp:  [36.2 °C (97.2 °F)] 36.2 °C (97.2 °F)  Pulse:  [77-99] 77  Resp:  [16-20] 18  BP: (112-169)/() 114/75  SpO2:  [94 %-99 %] 98 %  Blood Pressure : 112/68   Temperature: 36.2 °C (97.2 °F)   Pulse: 88   Respiration: 18   Pulse Oximetry: 94 %       Physical Exam  Vitals and nursing note reviewed. Exam conducted with a  chaperone present.   Constitutional:       General: He is not in acute distress.     Appearance: Normal appearance. He is ill-appearing.   HENT:      Head: Normocephalic and atraumatic.   Eyes:      General: No scleral icterus.        Right eye: No discharge.         Left eye: No discharge.   Cardiovascular:      Rate and Rhythm: Normal rate and regular rhythm.      Heart sounds: Murmur (Systolic, 3/6) heard.       Pulmonary:      Effort: No respiratory distress.      Breath sounds: Normal breath sounds. No wheezing or rales.   Abdominal:      General: Abdomen is flat. There is no distension.      Palpations: Abdomen is soft.      Tenderness: There is no abdominal tenderness. There is no guarding.   Musculoskeletal:         General: No tenderness.      Right lower leg: No edema.      Left lower leg: No edema.   Skin:     General: Skin is warm and dry.      Coloration: Skin is not jaundiced.   Neurological:      Mental Status: He is alert. He is disoriented.      Cranial Nerves: No cranial nerve deficit.   Psychiatric:         Behavior: Behavior is cooperative.         Cognition and Memory: Memory is impaired.         Laboratory:  Recent Labs     11/19/21  1536   WBC 6.9   RBC 4.70   HEMOGLOBIN 15.7   HEMATOCRIT 46.9   MCV 99.8*   MCH 33.4*   MCHC 33.5*   RDW 51.1*   PLATELETCT 235   MPV 9.7     Recent Labs     11/19/21  1536   SODIUM 139   POTASSIUM 3.9   CHLORIDE 102   CO2 23   GLUCOSE 149*   BUN 33*   CREATININE 1.65*   CALCIUM 10.5     Recent Labs     11/19/21  1536   ALTSGPT 16   ASTSGOT 18   ALKPHOSPHAT 82   TBILIRUBIN 0.4   GLUCOSE 149*         Recent Labs     11/19/21  1536   NTPROBNP 412*         Recent Labs     11/19/21  1536   TROPONINT 23*       Imaging:  DX-CHEST-PORTABLE (1 VIEW)   Final Result      No acute cardiopulmonary disease.          EKG:  I have personally reviewed the images and compared with prior images. and My impression is: Atrial flutter rate 99    Assessment/Plan:  I anticipate this  patient is appropriate for observation status at this time.    * Atrial flutter (HCC)  Assessment & Plan  Patient had developed a flutter  Cardiology consulted by the ED who recommended cardioversion since the patient was on Eliquis.  Patient has been cardioverted 200J and is now on sinus rhythm.  Admit to telemetry  Monitor overnight.  Continue Eliquis.    Acute renal failure superimposed on stage 3a chronic kidney disease (HCC)  Assessment & Plan  Patient has a history of CKD 3. However his creatinine is not at baseline. Currently creatinine is 1.65. Suspect this may be secondary to his a flutter resulting in poor circulation with subsequent prerenal kidney injury.  Repeat BMP in a.m.    Nonrheumatic aortic valve stenosis  Assessment & Plan  Patient has a history of moderate aortic stenosis. Last echocardiogram from 11/11/2021 shows moderate.  We will follow-up outpatient with cardiology.    HFrEF- (present on admission)  Assessment & Plan  Last echocardiogram from 11/11/2021 EF of 40%.  Continue home medications metoprolol, spironolactone.    Hyperlipidemia- (present on admission)  Assessment & Plan  Continue home medication atorvastatin. Last LDL 5 months ago was 53.    Paroxysmal atrial fibrillation- (present on admission)  Assessment & Plan  History of atrial fibrillation. Continue home medication metoprolol and amiodarone. Continue Eliquis for anticoagulation.  Cardiology following      VTE prophylaxis: therapeutic anticoagulation with Eliquis

## 2021-11-21 NOTE — DISCHARGE SUMMARY
Discharge Summary    CHIEF COMPLAINT ON ADMISSION  Chief Complaint   Patient presents with   • Dizziness     from cardiology clinic wtih Aflutter. denies pain. Denies shortness or air.        Reason for Admission  EMS     Admission Date  11/19/2021    CODE STATUS  Prior    HPI & HOSPITAL COURSE    Rajesh De Luna is a 76 y.o. male admitted 11/19/2021 with a history of atrial flutter on eliquis, oral cancer, and hypertension. He felt dizzy the morning of admission and presented to the cardiology clinic where he was diaphoretic, hypotensive, and in atrial fib/flutter. He was sent to the ED for successful DCCV. He has moderate to severe aortic stenosis for which he was scheduled for a TAVR earlier this month. Patient did not follow through with this procedure, unsure why. Patient admitted for observation and telemetry monitoring following cardioversion.        Patient remained hemodynamically stable in SR. Discussed with cardiology and there is some question of patient compliance and ability to care for himself as he has missed cardiology appointments. Social work consult placed and attempt for home health set up prior to discharge.  We got confirmation from the  that home health was set up.  Patient was discharged home       Therefore, he is discharged in good and stable condition to home with close outpatient follow-up.    The patient recovered much more quickly than anticipated on admission.    Discharge Date  11/20/2021    FOLLOW UP ITEMS POST DISCHARGE      DISCHARGE DIAGNOSES  Principal Problem:    Atrial flutter (HCC) POA: Yes  Active Problems:    Paroxysmal atrial fibrillation POA: Yes    Hyperlipidemia POA: Yes    HFrEF POA: Yes    Nonrheumatic aortic valve stenosis POA: Yes    Acute renal failure superimposed on stage 3a chronic kidney disease (HCC) POA: Yes  Resolved Problems:    Discharge planning issues POA: Unknown      FOLLOW UP  Future Appointments   Date Time Provider Department  [facility]   2021  1:00 PM Kevin Kang M.D. [id number]   2021 10:45 AM Oniel Ruiz M.D. [facility] None   2021  3:30 PM PH[facility]AC[facility] None         In 2 weeks  As needed, If symptoms worsen    Pcp Pt States None            MEDICATIONS ON DISCHARGE     Medication List      CONTINUE taking these medications      Instructions   amiodarone 200 MG Tabs  Commonly known as: Cordarone   Take 1 Tablet by mouth every day.  Dose: 200 mg     atorvastatin 40 MG Tabs  Commonly known as: Lipitor   Take 1 tablet by mouth every day.  Dose: 40 mg     clopidogrel 75 MG Tabs  Commonly known as: PLAVIX   Take 1 tablet by mouth every day.  Dose: 75 mg     Eliquis 5mg Tabs  Generic drug: apixaban   Take 1 tablet by mouth 2 times a day.  Dose: 5 mg     furosemide 20 MG Tabs  Commonly known as: LASIX   Take 1 Tablet by mouth 1 time a day as needed. as needed for weight gain greater than 3 pounds in 1 day or 5 pounds in 1 week.  Dose: 20 mg     metoprolol SR 50 MG Tb24  Commonly known as: TOPROL XL   Take 3 Tablets by mouth every day.  Dose: 150 mg     spironolactone 25 MG Tabs  Commonly known as: ALDACTONE   Take 1 Tablet by mouth every day.  Dose: 25 mg            Allergies  Allergies   Allergen Reactions   • Penicillins Hives              DIET  No orders of the defined types were placed in this encounter.      ACTIVITY  As tolerated.  Weight bearing as tolerated    CONSULTATIONS  Dr. Ruiz cardiology    [place] [address]       Rajesh De Luna   MRN: [id number], : 1945 , Sex: M   Admit: 2021, D/C: 2021             Oniel Ruiz M.D.   Physician   Cardiology   Procedures       Signed   Date of Service:  2021  4:57 PM              Pre-procedure Diagnoses   Paroxysmal atrial fibrillation (HCC) [I48.0]     Post-procedure Diagnoses   Paroxysmal atrial fibrillation (HCC) [I48.0]     Procedures   ELECTRICAL CARDIOVERSION [JIZ127 (Custom)]                []Hide  copied text    []Augustin for details      PROCEDURE IN DETAIL: The procedure was explained to the patient with risks and benefits including risk of stroke. The patient understands.  The patient has been compliant with his oral anticoagulation.  The pads were applied in the anterior and posterior approach. With synchronized biphasic waveform at 200J, one shock was successful in restoring sinus rhythm.  The patient had no immediate post-procedure complications. The rhythm was maintained and 12-lead EKG was requested.     IMPRESSION: Successful direct current cardioversion with restoration of sinus rhythm from atrial fibrillation with no immediate complication.                  LABORATORY  Lab Results   Component Value Date    SODIUM 140 11/20/2021    POTASSIUM 4.1 11/20/2021    CHLORIDE 105 11/20/2021    CO2 23 11/20/2021    GLUCOSE 86 11/20/2021    BUN 27 (H) 11/20/2021    CREATININE 1.33 11/20/2021        Lab Results   Component Value Date    WBC 6.3 11/20/2021    HEMOGLOBIN 12.6 (L) 11/20/2021    HEMATOCRIT 36.5 (L) 11/20/2021    PLATELETCT 182 11/20/2021        Total time of the discharge process exceeds 36  minutes.

## 2021-11-21 NOTE — DISCHARGE INSTRUCTIONS
Discharge Instructions    Discharged to home by car with relative. Discharged via wheelchair, hospital escort: Yes.  Special equipment needed: Not Applicable    Be sure to schedule a follow-up appointment with your primary care doctor or any specialists as instructed.     Discharge Plan:   Diet Plan: Discussed  Activity Level: Discussed  Confirmed Follow up Appointment: Appointment Scheduled  Confirmed Symptoms Management: Discussed  Medication Reconciliation Updated: Yes  Influenza Vaccine Indication: Not indicated: Previously immunized this influenza season and > 8 years of age    I understand that a diet low in cholesterol, fat, and sodium is recommended for good health. Unless I have been given specific instructions below for another diet, I accept this instruction as my diet prescription.   Other diet: Regular Diet    Special Instructions: None    · Is patient discharged on Warfarin / Coumadin?   No     Depression / Suicide Risk    As you are discharged from this [facility] facility, it is important to learn how to keep safe from harming yourself.    Recognize the warning signs:  · Abrupt changes in personality, positive or negative- including increase in energy   · Giving away possessions  · Change in eating patterns- significant weight changes-  positive or negative  · Change in sleeping patterns- unable to sleep or sleeping all the time   · Unwillingness or inability to communicate  · Depression  · Unusual sadness, discouragement and loneliness  · Talk of wanting to die  · Neglect of personal appearance   · Rebelliousness- reckless behavior  · Withdrawal from people/activities they love  · Confusion- inability to concentrate     If you or a loved one observes any of these behaviors or has concerns about self-harm, here's what you can do:  · Talk about it- your feelings and reasons for harming yourself  · Remove any means that you might use to hurt yourself (examples: pills, rope, extension cords,  firearm)  · Get professional help from the community (Mental Health, Substance Abuse, psychological counseling)  · Do not be alone:Call your Safe Contact- someone whom you trust who will be there for you.  · Call your local CRISIS HOTLINE 766-1108 or 775-771-1205  · Call your local Children's Mobile Crisis Response Team [place] (542) 631-2610 or www.eBrisk Video  · Call the toll free National Suicide Prevention Hotlines   · National Suicide Prevention Lifeline 776-898-RIIM (0899)  · National Hope Line Network 800-SUICIDE (376-2419)

## 2021-11-21 NOTE — PROGRESS NOTES
IV dc'd.  Discharge instructions given to patient; patient verbalizes understanding, all questions answered.  Copy of DC summary provided, signed copy in chart.  0 prescriptions electronically sent to pt's pharmacy, copies in chart.  Pt states personal belongings are in possession.  Pt escorted off unit by tech without incident.

## 2021-11-21 NOTE — PROGRESS NOTES
Assessment completed. Pt A&Ox 4. Respirations are even and unlabored on room air. Pt reports pain at this time. Monitors applied, VS stable, call light and belongings within reach. POC updated (D/C). Pt educated on room and call light, pt verbalized understanding. Communication board updated. Needs met.

## 2021-11-21 NOTE — DISCHARGE PLANNING
Received a call from [facility] requesting for a taxi voucher for pt to return home. [facility] reports that pt is A/Ox3 and independent with ambulation. [facility] said that pt is appropriate for a taxi. This ER CM provided taxi voucher and tubed to # [phone].

## 2021-11-22 NOTE — DISCHARGE PLANNING
LSW received call from [facility], stated they needed PCP information. HECTORW completed chart review of assessment done by SARA Chamberlain, unable to locate name of PCP for appointment on 11/24/2021. SARA informed Maria Luisa hickman [place] of this, recommended she contact the pt for more information.     SARA Mendoza

## 2021-11-24 ENCOUNTER — OFFICE VISIT (OUTPATIENT)
Dept: MEDICAL GROUP | Facility: MEDICAL CENTER | Age: 76
End: 2021-11-24
Payer: MEDICARE

## 2021-11-24 VITALS
OXYGEN SATURATION: 94 % | TEMPERATURE: 97 F | HEIGHT: 70 IN | SYSTOLIC BLOOD PRESSURE: 116 MMHG | WEIGHT: 179.8 LBS | HEART RATE: 62 BPM | BODY MASS INDEX: 25.74 KG/M2 | DIASTOLIC BLOOD PRESSURE: 74 MMHG

## 2021-11-24 DIAGNOSIS — I48.0 PAROXYSMAL ATRIAL FIBRILLATION (HCC): ICD-10-CM

## 2021-11-24 DIAGNOSIS — N18.31 ACUTE RENAL FAILURE WITH OTHER SPECIFIED PATHOLOGICAL KIDNEY LESION SUPERIMPOSED ON STAGE 3A CHRONIC KIDNEY DISEASE (HCC): ICD-10-CM

## 2021-11-24 DIAGNOSIS — Z12.11 SCREEN FOR COLON CANCER: ICD-10-CM

## 2021-11-24 DIAGNOSIS — N17.8 ACUTE RENAL FAILURE WITH OTHER SPECIFIED PATHOLOGICAL KIDNEY LESION SUPERIMPOSED ON STAGE 3A CHRONIC KIDNEY DISEASE (HCC): ICD-10-CM

## 2021-11-24 DIAGNOSIS — F17.200 SMOKING: ICD-10-CM

## 2021-11-24 DIAGNOSIS — I10 PRIMARY HYPERTENSION: ICD-10-CM

## 2021-11-24 DIAGNOSIS — E78.5 HYPERLIPIDEMIA, UNSPECIFIED HYPERLIPIDEMIA TYPE: ICD-10-CM

## 2021-11-24 PROCEDURE — 99214 OFFICE O/P EST MOD 30 MIN: CPT | Performed by: STUDENT IN AN ORGANIZED HEALTH CARE EDUCATION/TRAINING PROGRAM

## 2021-11-24 ASSESSMENT — FIBROSIS 4 INDEX: FIB4 SCORE: 1.928571428571428571

## 2021-11-24 NOTE — ASSESSMENT & PLAN NOTE
Improving.  Continue to monitor renal function, repeat labs in 3 months and follow-up  Avoid nephrotoxins, consider ACE/ARB

## 2021-11-24 NOTE — ASSESSMENT & PLAN NOTE
Heart rate is 62 and regular in clinic today, has not had any subsequent episodes of lightheadedness/dizziness/rapid heart rate or palpitations  Continue current medications  He has follow-up appointment with Dr. Ruiz on December 2

## 2021-11-24 NOTE — ASSESSMENT & PLAN NOTE
Chronic, stable, well controlled.  Could consider ACE/ARB given history of heart failure, which was improved on most recent echo.  Has upcoming appoint with cardiology.

## 2021-11-24 NOTE — ASSESSMENT & PLAN NOTE
Counseled on quitting smoking, patient is not interested at this time, somewhat apathetic towards health benefits of smoking cessation

## 2021-11-24 NOTE — PROGRESS NOTES
Subjective:     CC: Establish care, follow-up on hospital admission    HPI:   Jacobo presents today with the below:    Problem   Acute Renal Failure Superimposed On Stage 3a Chronic Kidney Disease (Hcc)    Noted on 11/19 in ED, improved the following day, reviewed labs from hospital  Results for JACOBO RAMIREZ (MRN [id number]) as of 11/24/2021 14:04   Ref. Range 11/19/2021 15:36 11/20/2021 00:43   Bun Latest Ref Range: 8 - 22 mg/dL 33 (H) 27 (H)   Creatinine Latest Ref Range: 0.50 - 1.40 mg/dL 1.65 (H) 1.33        Smoking    Cigars 1-2x/day     Paroxysmal atrial fibrillation    On 11/19 he was sent to the ED due to feeling lightheaded and was diaphoretic, found to be in rapid A. fib/flutter.  Underwent DCCV by Dr. Ruiz, successfully restored to sinus rhythm after 1 shock.   Currently taking amiodarone 200 mg, apixaban, clopidogrel, Lasix 20 mg, metoprolol 150 mg daily, and spironolactone 25 mg daily.  Reviewed medications in detail with patient, he reports compliance.  He does have questions about being able to discontinue some of these medications, I advised him that he would need to follow-up with cardiology before considering that but that all of these medicines have good indications in his case.     Hyperlipidemia    Reviewed last labs  Results for JACOBO RAMIREZ (MRN [id number]) as of 11/24/2021 14:04   Ref. Range 6/13/2021 02:35   Cholesterol,Tot Latest Ref Range: 100 - 199 mg/dL 108   Triglycerides Latest Ref Range: 0 - 149 mg/dL 75   HDL Latest Ref Range: >=40 mg/dL 40   LDL Latest Ref Range: <100 mg/dL 53          Hypertension    116/74 in clinic today  Is on metoprolol 150, spironolactone 25 and Lasix 20  Is not on ACE/ARB         Current Outpatient Medications Ordered in Epic   Medication Sig Dispense Refill   • clopidogrel (PLAVIX) 75 MG Tab Take 1 tablet by mouth every day. 90 Tablet 3   • amiodarone (CORDARONE) 200 MG Tab Take 1 Tablet by mouth every day. 100 Tablet 2   • spironolactone  "(ALDACTONE) 25 MG Tab Take 1 Tablet by mouth every day. 100 Tablet 2   • metoprolol SR (TOPROL XL) 50 MG TABLET SR 24 HR Take 3 Tablets by mouth every day. 180 Tablet 5   • furosemide (LASIX) 20 MG Tab Take 1 Tablet by mouth 1 time a day as needed. as needed for weight gain greater than 3 pounds in 1 day or 5 pounds in 1 week. 30 Tablet 11   • apixaban (ELIQUIS) 5mg Tab Take 1 tablet by mouth 2 times a day. 60 tablet 11   • atorvastatin (LIPITOR) 40 MG Tab Take 1 tablet by mouth every day. 90 tablet 1     No current Epic-ordered facility-administered medications on file.       Health Maintenance: Completed    ROS:  Gen: no fevers/chills, no changes in weight  Eyes: no changes in vision  ENT: no sore throat, no hearing loss, no bloody nose  Pulm: no sob, no cough  CV: no chest pain, no palpitations  GI: no nausea/vomiting, no diarrhea  : no dysuria  MSk: no myalgias  Skin: no rash  Neuro: no headaches, no numbness/tingling  Heme/Lymph: no easy bruising      Objective:     Exam:  /74   Pulse 62   Temp 36.1 °C (97 °F) (Temporal)   Ht 1.778 m (5' 10\")   Wt 81.6 kg (179 lb 12.8 oz)   SpO2 94%   BMI 25.80 kg/m²  Body mass index is 25.8 kg/m².    Gen: Alert and oriented, No apparent distress.  Neck: Neck is supple without lymphadenopathy.  Lungs: Normal effort, CTA bilaterally, no wheezes, rhonchi, or rales  CV: Regular rate and rhythm. No murmurs, rubs, or gallops.  Ext: No clubbing, cyanosis, edema.      Assessment & Plan:     76 y.o. male with the following -     Problem List Items Addressed This Visit     Paroxysmal atrial fibrillation     Heart rate is 62 and regular in clinic today, has not had any subsequent episodes of lightheadedness/dizziness/rapid heart rate or palpitations  Continue current medications  He has follow-up appointment with Dr. Ruiz on December 2           Hyperlipidemia     On high intensity statin therapy, atorvastatin 40 mg, continue           Hypertension     Chronic, stable, " well controlled.  Could consider ACE/ARB given history of heart failure, which was improved on most recent echo.  Has upcoming appoint with cardiology.         Smoking     Counseled on quitting smoking, patient is not interested at this time, somewhat apathetic towards health benefits of smoking cessation         Acute renal failure superimposed on stage 3a chronic kidney disease (HCC)     Improving.  Continue to monitor renal function, repeat labs in 3 months and follow-up  Avoid nephrotoxins, consider ACE/ARB           Other Visit Diagnoses     Screen for colon cancer        Relevant Orders    COLOGUARD (FIT DNA)          Return in about 3 months (around 2/24/2022).    Please note that this dictation was created using voice recognition software. I have made every reasonable attempt to correct obvious errors, but I expect that there are errors of grammar and possibly content that I did not discover before finalizing the note.

## 2021-12-02 ENCOUNTER — OFFICE VISIT (OUTPATIENT)
Dept: CARDIOLOGY | Facility: MEDICAL CENTER | Age: 76
End: 2021-12-02
Payer: MEDICARE

## 2021-12-02 ENCOUNTER — PHARMACY VISIT (OUTPATIENT)
Dept: PHARMACY | Facility: MEDICAL CENTER | Age: 76
End: 2021-12-02
Payer: MEDICARE

## 2021-12-02 VITALS
HEART RATE: 60 BPM | RESPIRATION RATE: 16 BRPM | DIASTOLIC BLOOD PRESSURE: 64 MMHG | OXYGEN SATURATION: 96 % | WEIGHT: 182 LBS | SYSTOLIC BLOOD PRESSURE: 104 MMHG | HEIGHT: 70 IN | BODY MASS INDEX: 26.05 KG/M2

## 2021-12-02 DIAGNOSIS — I48.3 TYPICAL ATRIAL FLUTTER (HCC): ICD-10-CM

## 2021-12-02 DIAGNOSIS — I47.10 SVT (SUPRAVENTRICULAR TACHYCARDIA) (HCC): ICD-10-CM

## 2021-12-02 DIAGNOSIS — I48.0 PAROXYSMAL ATRIAL FIBRILLATION (HCC): ICD-10-CM

## 2021-12-02 DIAGNOSIS — I10 PRIMARY HYPERTENSION: ICD-10-CM

## 2021-12-02 DIAGNOSIS — I50.20 HFREF (HEART FAILURE WITH REDUCED EJECTION FRACTION) (HCC): ICD-10-CM

## 2021-12-02 DIAGNOSIS — I10 HYPERTENSION, UNSPECIFIED TYPE: ICD-10-CM

## 2021-12-02 DIAGNOSIS — F17.200 SMOKING: ICD-10-CM

## 2021-12-02 DIAGNOSIS — N17.8 ACUTE RENAL FAILURE WITH OTHER SPECIFIED PATHOLOGICAL KIDNEY LESION SUPERIMPOSED ON STAGE 3A CHRONIC KIDNEY DISEASE (HCC): ICD-10-CM

## 2021-12-02 DIAGNOSIS — I48.20 CHRONIC ATRIAL FIBRILLATION (HCC): ICD-10-CM

## 2021-12-02 DIAGNOSIS — N18.31 ACUTE RENAL FAILURE WITH OTHER SPECIFIED PATHOLOGICAL KIDNEY LESION SUPERIMPOSED ON STAGE 3A CHRONIC KIDNEY DISEASE (HCC): ICD-10-CM

## 2021-12-02 DIAGNOSIS — I50.9 HEART FAILURE, NYHA CLASS 3 (HCC): ICD-10-CM

## 2021-12-02 DIAGNOSIS — E78.5 HYPERLIPIDEMIA, UNSPECIFIED HYPERLIPIDEMIA TYPE: ICD-10-CM

## 2021-12-02 DIAGNOSIS — D73.89 SPLENIC CALCIFICATION: ICD-10-CM

## 2021-12-02 DIAGNOSIS — I35.0 NONRHEUMATIC AORTIC VALVE STENOSIS: ICD-10-CM

## 2021-12-02 PROCEDURE — RXMED WILLOW AMBULATORY MEDICATION CHARGE: Performed by: NURSE PRACTITIONER

## 2021-12-02 PROCEDURE — RXMED WILLOW AMBULATORY MEDICATION CHARGE: Performed by: INTERNAL MEDICINE

## 2021-12-02 PROCEDURE — 99215 OFFICE O/P EST HI 40 MIN: CPT | Performed by: INTERNAL MEDICINE

## 2021-12-02 RX ORDER — METOPROLOL SUCCINATE 50 MG/1
150 TABLET, EXTENDED RELEASE ORAL DAILY
Qty: 180 TABLET | Refills: 5 | Status: SHIPPED | OUTPATIENT
Start: 2021-12-02 | End: 2023-01-12 | Stop reason: SDUPTHER

## 2021-12-02 RX ORDER — ATORVASTATIN CALCIUM 40 MG/1
40 TABLET, FILM COATED ORAL DAILY
Qty: 90 TABLET | Refills: 3 | Status: SHIPPED | OUTPATIENT
Start: 2021-12-02 | End: 2022-11-22 | Stop reason: SDUPTHER

## 2021-12-02 RX ORDER — SPIRONOLACTONE 25 MG/1
25 TABLET ORAL DAILY
Qty: 100 TABLET | Refills: 3 | Status: SHIPPED | OUTPATIENT
Start: 2021-12-02 | End: 2023-01-12 | Stop reason: SDUPTHER

## 2021-12-02 RX ORDER — AMIODARONE HYDROCHLORIDE 200 MG/1
200 TABLET ORAL DAILY
Qty: 100 TABLET | Refills: 2 | Status: SHIPPED | OUTPATIENT
Start: 2021-12-02 | End: 2022-05-10 | Stop reason: SDUPTHER

## 2021-12-02 ASSESSMENT — ENCOUNTER SYMPTOMS
MUSCULOSKELETAL NEGATIVE: 1
GASTROINTESTINAL NEGATIVE: 1
CLAUDICATION: 0
SHORTNESS OF BREATH: 0
RESPIRATORY NEGATIVE: 1
CARDIOVASCULAR NEGATIVE: 1
HEMOPTYSIS: 0
WEAKNESS: 0
COUGH: 0
CONSTITUTIONAL NEGATIVE: 1
BRUISES/BLEEDS EASILY: 0
LOSS OF CONSCIOUSNESS: 0
EYES NEGATIVE: 1
NEUROLOGICAL NEGATIVE: 1
CHILLS: 0
SPUTUM PRODUCTION: 0
WHEEZING: 0
SORE THROAT: 0
FEVER: 0
STRIDOR: 0
PND: 0
ORTHOPNEA: 0
PALPITATIONS: 0
DIZZINESS: 0

## 2021-12-02 ASSESSMENT — FIBROSIS 4 INDEX: FIB4 SCORE: 1.928571428571428571

## 2021-12-02 NOTE — PROGRESS NOTES
Chief Complaint   Patient presents with   • Atrial Fibrillation   • Hyperlipidemia   • Supraventricular Tachycardia (SVT)       Subjective     Rajesh De Luna is a 76 y.o. male who presents today as a follow-up for his paroxysmal atrial fibrillation aortic stenosis by blood pressure and heart failure with midrange ejection fraction. Since he was last seen he underwent a dobutamine stress echo showing aortic valve area of 0.8 with a gradient approaching severe. Note the peak velocity was 3.8 m/s. He can still walk a mile with no functional notations. He is having no chest pain. He denies shortness of breath. He has not been able to complete his carotid duplex.    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 No pertinent family history.  Social History     Socioeconomic History   • Marital status: Single     Spouse name: Not on file   • Number of children: Not on file   • Years of education: Not on file   • Highest education level: Not on file   Occupational History   • Not on file   Tobacco Use   • Smoking status: Current Some Day Smoker     Types: Cigars   • Smokeless tobacco: Never Used   • Tobacco comment: reports Cigars, 3 per day   Vaping Use   • Vaping Use: Never used   Substance and Sexual Activity   • Alcohol use: Yes     Comment: reports 2/day   • Drug use: Not Currently   • Sexual activity: Not on file   Other Topics Concern   • Not on file   Social History Narrative   • Not on file     Social Determinants of Health     Financial  Resource Strain:    • Difficulty of Paying Living Expenses: Not on file   Food Insecurity:    • Worried About Running Out of Food in the Last Year: Not on file   • Ran Out of Food in the Last Year: Not on file   Transportation Needs:    • Lack of Transportation (Medical): Not on file   • Lack of Transportation (Non-Medical): Not on file   Physical Activity:    • Days of Exercise per Week: Not on file   • Minutes of Exercise per Session: Not on file   Stress:    • Feeling of Stress : Not on file   Social Connections:    • Frequency of Communication with Friends and Family: Not on file   • Frequency of Social Gatherings with Friends and Family: Not on file   • Attends Yazidism Services: Not on file   • Active Member of Clubs or Organizations: Not on file   • Attends Club or Organization Meetings: Not on file   • Marital Status: Not on file   Intimate Partner Violence:    • Fear of Current or Ex-Partner: Not on file   • Emotionally Abused: Not on file   • Physically Abused: Not on file   • Sexually Abused: Not on file   Housing Stability:    • Unable to Pay for Housing in the Last Year: Not on file   • Number of Places Lived in the Last Year: Not on file   • Unstable Housing in the Last Year: Not on file     Allergies   Allergen Reactions   • Penicillins Hives            Outpatient Encounter Medications as of 12/2/2021   Medication Sig Dispense Refill   • spironolactone (ALDACTONE) 25 MG Tab Take 1 Tablet by mouth every day. 100 Tablet 3   • metoprolol SR (TOPROL XL) 50 MG TABLET SR 24 HR Take 3 Tablets by mouth every day. 180 Tablet 5   • atorvastatin (LIPITOR) 40 MG Tab Take 1 tablet by mouth every day. 90 Tablet 3   • apixaban (ELIQUIS) 5mg Tab Take 1 tablet by mouth 2 times a day. 60 Tablet 11   • amiodarone (CORDARONE) 200 MG Tab Take 1 Tablet by mouth every day. 100 Tablet 2   • clopidogrel (PLAVIX) 75 MG Tab Take 1 tablet by mouth every day. 90 Tablet 3   • furosemide (LASIX) 20 MG Tab Take 1 Tablet by mouth 1  "time a day as needed. as needed for weight gain greater than 3 pounds in 1 day or 5 pounds in 1 week. 30 Tablet 11   • [DISCONTINUED] amiodarone (CORDARONE) 200 MG Tab Take 1 Tablet by mouth every day. 100 Tablet 2   • [DISCONTINUED] spironolactone (ALDACTONE) 25 MG Tab Take 1 Tablet by mouth every day. 100 Tablet 2   • [DISCONTINUED] metoprolol SR (TOPROL XL) 50 MG TABLET SR 24 HR Take 3 Tablets by mouth every day. 180 Tablet 5   • [DISCONTINUED] apixaban (ELIQUIS) 5mg Tab Take 1 tablet by mouth 2 times a day. 60 tablet 11   • [DISCONTINUED] atorvastatin (LIPITOR) 40 MG Tab Take 1 tablet by mouth every day. 90 tablet 1     No facility-administered encounter medications on file as of 12/2/2021.     Review of Systems   Constitutional: Negative.  Negative for chills, fever and malaise/fatigue.   HENT: Negative.  Negative for sore throat.    Eyes: Negative.    Respiratory: Negative.  Negative for cough, hemoptysis, sputum production, shortness of breath, wheezing and stridor.    Cardiovascular: Negative.  Negative for chest pain, palpitations, orthopnea, claudication, leg swelling and PND.   Gastrointestinal: Negative.    Genitourinary: Negative.    Musculoskeletal: Negative.    Skin: Negative.    Neurological: Negative.  Negative for dizziness, loss of consciousness and weakness.   Endo/Heme/Allergies: Negative.  Does not bruise/bleed easily.   All other systems reviewed and are negative.             Objective     /64 (BP Location: Left arm, Patient Position: Sitting, BP Cuff Size: Adult)   Pulse 60   Resp 16   Ht 1.778 m (5' 10\")   Wt 82.6 kg (182 lb)   SpO2 96%   BMI 26.11 kg/m²     Physical Exam  Vitals and nursing note reviewed.   Constitutional:       General: He is not in acute distress.     Appearance: He is well-developed. He is not diaphoretic.   HENT:      Head: Normocephalic and atraumatic.      Right Ear: External ear normal.      Left Ear: External ear normal.      Nose: Nose normal.      " Mouth/Throat:      Pharynx: No oropharyngeal exudate.   Eyes:      General: No scleral icterus.        Right eye: No discharge.         Left eye: No discharge.      Conjunctiva/sclera: Conjunctivae normal.      Pupils: Pupils are equal, round, and reactive to light.   Neck:      Vascular: No JVD.   Cardiovascular:      Rate and Rhythm: Normal rate and regular rhythm.      Heart sounds: No murmur heard.  No friction rub. No gallop.    Pulmonary:      Effort: Pulmonary effort is normal. No respiratory distress.      Breath sounds: No stridor. No wheezing or rales.   Chest:      Chest wall: No tenderness.   Abdominal:      General: There is no distension.      Palpations: Abdomen is soft.      Tenderness: There is no guarding.   Musculoskeletal:         General: No tenderness or deformity. Normal range of motion.      Cervical back: Neck supple.   Skin:     General: Skin is warm and dry.      Coloration: Skin is not pale.      Findings: No erythema or rash.   Neurological:      Mental Status: He is alert.      Cranial Nerves: No cranial nerve deficit.      Motor: No abnormal muscle tone.      Coordination: Coordination normal.      Deep Tendon Reflexes: Reflexes are normal and symmetric. Reflexes normal.   Psychiatric:         Behavior: Behavior normal.         Thought Content: Thought content normal.         Judgment: Judgment normal.            Echocardiogram: Dated 11/12/2021 personally viewed under myself showing interval improvement in EF to 40% with low flow low gradient aortic stenosis.    CTA: Dated 10/11/2021 personally viewed under myself showing a heavily calcified aortic valve.    Lab Results   Component Value Date/Time    CHOLSTRLTOT 108 06/13/2021 02:35 AM    LDL 53 06/13/2021 02:35 AM    HDL 40 06/13/2021 02:35 AM    TRIGLYCERIDE 75 06/13/2021 02:35 AM       Lab Results   Component Value Date/Time    SODIUM 140 11/20/2021 12:43 AM    POTASSIUM 4.1 11/20/2021 12:43 AM    CHLORIDE 105 11/20/2021 12:43 AM     CO2 23 11/20/2021 12:43 AM    GLUCOSE 86 11/20/2021 12:43 AM    BUN 27 (H) 11/20/2021 12:43 AM    CREATININE 1.33 11/20/2021 12:43 AM     Lab Results   Component Value Date/Time    ALKPHOSPHAT 82 11/19/2021 03:36 PM    ASTSGOT 18 11/19/2021 03:36 PM    ALTSGPT 16 11/19/2021 03:36 PM    TBILIRUBIN 0.4 11/19/2021 03:36 PM          Assessment & Plan     1. Paroxysmal atrial fibrillation     2. Hyperlipidemia, unspecified hyperlipidemia type  spironolactone (ALDACTONE) 25 MG Tab    amiodarone (CORDARONE) 200 MG Tab   3. Primary hypertension  spironolactone (ALDACTONE) 25 MG Tab    apixaban (ELIQUIS) 5mg Tab    amiodarone (CORDARONE) 200 MG Tab    REFERRAL TO CARDIOLOGY   4. Chronic atrial fibrillation (HCC)  US-CAROTID DOPPLER BILAT    spironolactone (ALDACTONE) 25 MG Tab    apixaban (ELIQUIS) 5mg Tab    amiodarone (CORDARONE) 200 MG Tab   5. SVT (supraventricular tachycardia) (HCC)  spironolactone (ALDACTONE) 25 MG Tab    apixaban (ELIQUIS) 5mg Tab    amiodarone (CORDARONE) 200 MG Tab    REFERRAL TO CARDIOLOGY   6. HFrEF  US-CAROTID DOPPLER BILAT    spironolactone (ALDACTONE) 25 MG Tab    apixaban (ELIQUIS) 5mg Tab    amiodarone (CORDARONE) 200 MG Tab    REFERRAL TO CARDIOLOGY   7. Smoking  US-CAROTID DOPPLER BILAT    apixaban (ELIQUIS) 5mg Tab    amiodarone (CORDARONE) 200 MG Tab   8. Splenic calcification  US-CAROTID DOPPLER BILAT   9. Typical atrial flutter (HCC)  US-CAROTID DOPPLER BILAT    REFERRAL TO CARDIOLOGY   10. Nonrheumatic aortic valve stenosis     11. Acute renal failure with other specified pathological kidney lesion superimposed on stage 3a chronic kidney disease (HCC)     12. Heart failure, NYHA class 3 (HCC)  metoprolol SR (TOPROL XL) 50 MG TABLET SR 24 HR   13. Hypertension, unspecified type  atorvastatin (LIPITOR) 40 MG Tab       Medical Decision Making: Today's Assessment/Status/Plan:        76-year-old male with low flow low gradient severe aortic stenosis. I will make referral for him to be  followed up in the valve clinic. I think this point he is minimally symptomatic and does not need valve replacement but can defer this for now. What is concerning is his reduced ejection fraction therefore be reasonable to proceed with this. Otherwise I refilled his medications. Of also ordered a carotid duplex. I will see him back in 3 months.    1. A-fib with RVR    - cont eliquis    - metop XL 50    - cont amio    2. CHFrEF vs. Valvular cardiomyopathy    - metop per above    - cont sprio 25     3. Aortic stenosis vs. pseudostenosis    - evaluate during PORT[facility]O    4. CAD    - cont plavix    - cont atorva 40

## 2021-12-03 RX ORDER — CLOPIDOGREL BISULFATE 75 MG/1
75 TABLET ORAL DAILY
Qty: 90 TABLET | Refills: 2 | Status: SHIPPED | OUTPATIENT
Start: 2021-12-03 | End: 2021-12-06

## 2021-12-06 ENCOUNTER — APPOINTMENT (OUTPATIENT)
Dept: CARDIOLOGY | Facility: MEDICAL CENTER | Age: 76
End: 2021-12-06
Payer: MEDICARE

## 2021-12-06 ENCOUNTER — OFFICE VISIT (OUTPATIENT)
Dept: CARDIOLOGY | Facility: MEDICAL CENTER | Age: 76
End: 2021-12-06
Payer: MEDICARE

## 2021-12-06 VITALS
HEIGHT: 70 IN | WEIGHT: 181 LBS | RESPIRATION RATE: 15 BRPM | SYSTOLIC BLOOD PRESSURE: 98 MMHG | DIASTOLIC BLOOD PRESSURE: 60 MMHG | BODY MASS INDEX: 25.91 KG/M2 | HEART RATE: 65 BPM

## 2021-12-06 DIAGNOSIS — I50.20 HFREF (HEART FAILURE WITH REDUCED EJECTION FRACTION) (HCC): ICD-10-CM

## 2021-12-06 DIAGNOSIS — I47.10 SVT (SUPRAVENTRICULAR TACHYCARDIA) (HCC): ICD-10-CM

## 2021-12-06 DIAGNOSIS — I10 PRIMARY HYPERTENSION: ICD-10-CM

## 2021-12-06 DIAGNOSIS — E78.5 HYPERLIPIDEMIA, UNSPECIFIED HYPERLIPIDEMIA TYPE: ICD-10-CM

## 2021-12-06 DIAGNOSIS — N17.8 ACUTE RENAL FAILURE WITH OTHER SPECIFIED PATHOLOGICAL KIDNEY LESION SUPERIMPOSED ON STAGE 3A CHRONIC KIDNEY DISEASE (HCC): ICD-10-CM

## 2021-12-06 DIAGNOSIS — I48.20 CHRONIC ATRIAL FIBRILLATION (HCC): ICD-10-CM

## 2021-12-06 DIAGNOSIS — I48.3 TYPICAL ATRIAL FLUTTER (HCC): ICD-10-CM

## 2021-12-06 DIAGNOSIS — N18.31 ACUTE RENAL FAILURE WITH OTHER SPECIFIED PATHOLOGICAL KIDNEY LESION SUPERIMPOSED ON STAGE 3A CHRONIC KIDNEY DISEASE (HCC): ICD-10-CM

## 2021-12-06 DIAGNOSIS — D73.89 SPLENIC CALCIFICATION: ICD-10-CM

## 2021-12-06 DIAGNOSIS — I35.0 NONRHEUMATIC AORTIC VALVE STENOSIS: ICD-10-CM

## 2021-12-06 DIAGNOSIS — I48.0 PAROXYSMAL ATRIAL FIBRILLATION (HCC): ICD-10-CM

## 2021-12-06 PROCEDURE — 99214 OFFICE O/P EST MOD 30 MIN: CPT | Performed by: INTERNAL MEDICINE

## 2021-12-06 ASSESSMENT — ENCOUNTER SYMPTOMS
SHORTNESS OF BREATH: 0
CARDIOVASCULAR NEGATIVE: 1
SPUTUM PRODUCTION: 0
CLAUDICATION: 0
RESPIRATORY NEGATIVE: 1
PND: 0
WEAKNESS: 0
SORE THROAT: 0
CONSTITUTIONAL NEGATIVE: 1
STRIDOR: 0
LOSS OF CONSCIOUSNESS: 0
ORTHOPNEA: 0
DIZZINESS: 0
BRUISES/BLEEDS EASILY: 0
CHILLS: 0
MUSCULOSKELETAL NEGATIVE: 1
NEUROLOGICAL NEGATIVE: 1
EYES NEGATIVE: 1
PALPITATIONS: 0
HEMOPTYSIS: 0
COUGH: 0
WHEEZING: 0
GASTROINTESTINAL NEGATIVE: 1
FEVER: 0

## 2021-12-06 ASSESSMENT — FIBROSIS 4 INDEX: FIB4 SCORE: 1.928571428571428571

## 2021-12-07 NOTE — PROGRESS NOTES
Chief Complaint   Patient presents with   • Atrial Fibrillation     F/V Dx: Paroxysmal atrial fibrillation        Subjective     Rajesh De Luna is a 76 y.o. male who presents today as a follow-up for his paroxysmal atrial fibrillation aortic stenosis by blood pressure and heart failure with midrange ejection fraction. Since he was last seen he underwent a dobutamine stress echo showing aortic valve area of 0.8 with a gradient approaching severe. Note the peak velocity was 3.8 m/s.   Since we saw him last week and continues to be well with no shortness of breath or chest pain.  He brings in his medications today.  We reviewed them and it appears that these are in fact medications he is taking.  He has no lower extremity edema.  He admits that his memory is getting bad.  He is having trouble remembering daily events.    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 No pertinent family history.  Social History     Socioeconomic History   • Marital status: Single     Spouse name: Not on file   • Number of children: Not on file   • Years of education: Not on file   • Highest education level: Not on file   Occupational History   • Not on file   Tobacco Use   • Smoking status: Current Some Day Smoker     Types: Cigars   • Smokeless tobacco: Never Used   • Tobacco comment: reports Cigars, 3 per day   Vaping Use   • Vaping Use: Never used   Substance and Sexual Activity   • Alcohol use: Yes     Comment: reports 2/day   • Drug use: Not Currently   •  Sexual activity: Not on file   Other Topics Concern   • Not on file   Social History Narrative   • Not on file     Social Determinants of Health     Financial Resource Strain:    • Difficulty of Paying Living Expenses: Not on file   Food Insecurity:    • Worried About Running Out of Food in the Last Year: Not on file   • Ran Out of Food in the Last Year: Not on file   Transportation Needs:    • Lack of Transportation (Medical): Not on file   • Lack of Transportation (Non-Medical): Not on file   Physical Activity:    • Days of Exercise per Week: Not on file   • Minutes of Exercise per Session: Not on file   Stress:    • Feeling of Stress : Not on file   Social Connections:    • Frequency of Communication with Friends and Family: Not on file   • Frequency of Social Gatherings with Friends and Family: Not on file   • Attends Taoist Services: Not on file   • Active Member of Clubs or Organizations: Not on file   • Attends Club or Organization Meetings: Not on file   • Marital Status: Not on file   Intimate Partner Violence:    • Fear of Current or Ex-Partner: Not on file   • Emotionally Abused: Not on file   • Physically Abused: Not on file   • Sexually Abused: Not on file   Housing Stability:    • Unable to Pay for Housing in the Last Year: Not on file   • Number of Places Lived in the Last Year: Not on file   • Unstable Housing in the Last Year: Not on file     Allergies   Allergen Reactions   • Penicillins Hives            Outpatient Encounter Medications as of 12/6/2021   Medication Sig Dispense Refill   • spironolactone (ALDACTONE) 25 MG Tab Take 1 Tablet by mouth every day. 100 Tablet 3   • metoprolol SR (TOPROL XL) 50 MG TABLET SR 24 HR Take 3 Tablets by mouth every day. 180 Tablet 5   • atorvastatin (LIPITOR) 40 MG Tab Take 1 tablet by mouth every day. 90 Tablet 3   • apixaban (ELIQUIS) 5mg Tab Take 1 tablet by mouth 2 times a day. 60 Tablet 11   • amiodarone (CORDARONE) 200 MG Tab Take 1 Tablet by mouth  "every day. 100 Tablet 2   • clopidogrel (PLAVIX) 75 MG Tab Take 1 tablet by mouth every day. 90 Tablet 3   • furosemide (LASIX) 20 MG Tab Take 1 Tablet by mouth 1 time a day as needed. as needed for weight gain greater than 3 pounds in 1 day or 5 pounds in 1 week. 30 Tablet 11   • [DISCONTINUED] clopidogrel (PLAVIX) 75 MG Tab Take 1 tablet by mouth every day. (Patient not taking: Reported on 12/6/2021) 90 Tablet 2     No facility-administered encounter medications on file as of 12/6/2021.     Review of Systems   Constitutional: Negative.  Negative for chills, fever and malaise/fatigue.   HENT: Negative.  Negative for sore throat.    Eyes: Negative.    Respiratory: Negative.  Negative for cough, hemoptysis, sputum production, shortness of breath, wheezing and stridor.    Cardiovascular: Negative.  Negative for chest pain, palpitations, orthopnea, claudication, leg swelling and PND.   Gastrointestinal: Negative.    Genitourinary: Negative.    Musculoskeletal: Negative.    Skin: Negative.    Neurological: Negative.  Negative for dizziness, loss of consciousness and weakness.   Endo/Heme/Allergies: Negative.  Does not bruise/bleed easily.   All other systems reviewed and are negative.             Objective     BP (!) 98/60 (BP Location: Right arm, Patient Position: Sitting, BP Cuff Size: Adult)   Pulse 65   Resp 15   Ht 1.778 m (5' 10\")   Wt 82.1 kg (181 lb)   BMI 25.97 kg/m²     Physical Exam  Vitals and nursing note reviewed.   Constitutional:       General: He is not in acute distress.     Appearance: He is well-developed. He is not diaphoretic.   HENT:      Head: Normocephalic and atraumatic.      Right Ear: External ear normal.      Left Ear: External ear normal.      Nose: Nose normal.      Mouth/Throat:      Pharynx: No oropharyngeal exudate.   Eyes:      General: No scleral icterus.        Right eye: No discharge.         Left eye: No discharge.      Conjunctiva/sclera: Conjunctivae normal.      Pupils: " Pupils are equal, round, and reactive to light.   Neck:      Vascular: No JVD.   Cardiovascular:      Rate and Rhythm: Normal rate and regular rhythm.      Heart sounds: No murmur heard.  No friction rub. No gallop.    Pulmonary:      Effort: Pulmonary effort is normal. No respiratory distress.      Breath sounds: No stridor. No wheezing or rales.   Chest:      Chest wall: No tenderness.   Abdominal:      General: There is no distension.      Palpations: Abdomen is soft.      Tenderness: There is no guarding.   Musculoskeletal:         General: No tenderness or deformity. Normal range of motion.      Cervical back: Neck supple.   Skin:     General: Skin is warm and dry.      Coloration: Skin is not pale.      Findings: No erythema or rash.   Neurological:      Mental Status: He is alert.      Cranial Nerves: No cranial nerve deficit.      Motor: No abnormal muscle tone.      Coordination: Coordination normal.      Deep Tendon Reflexes: Reflexes are normal and symmetric. Reflexes normal.   Psychiatric:         Behavior: Behavior normal.         Thought Content: Thought content normal.         Judgment: Judgment normal.            Echocardiogram: Dated 11/12/2021 personally viewed under myself showing interval improvement in EF to 40% with low flow low gradient aortic stenosis.    CTA: Dated 10/11/2021 personally viewed under myself showing a heavily calcified aortic valve.    Lab Results   Component Value Date/Time    CHOLSTRLTOT 108 06/13/2021 02:35 AM    LDL 53 06/13/2021 02:35 AM    HDL 40 06/13/2021 02:35 AM    TRIGLYCERIDE 75 06/13/2021 02:35 AM       Lab Results   Component Value Date/Time    SODIUM 140 11/20/2021 12:43 AM    POTASSIUM 4.1 11/20/2021 12:43 AM    CHLORIDE 105 11/20/2021 12:43 AM    CO2 23 11/20/2021 12:43 AM    GLUCOSE 86 11/20/2021 12:43 AM    BUN 27 (H) 11/20/2021 12:43 AM    CREATININE 1.33 11/20/2021 12:43 AM     Lab Results   Component Value Date/Time    ALKPHOSPHAT 82 11/19/2021 03:36 PM     ASTSGOT 18 11/19/2021 03:36 PM    ALTSGPT 16 11/19/2021 03:36 PM    TBILIRUBIN 0.4 11/19/2021 03:36 PM          Assessment & Plan     1. Paroxysmal atrial fibrillation     2. Hyperlipidemia, unspecified hyperlipidemia type     3. Primary hypertension     4. Chronic atrial fibrillation (HCC)     5. SVT (supraventricular tachycardia) (HCC)     6. HFrEF     7. Typical atrial flutter (HCC)     8. Splenic calcification     9. Nonrheumatic aortic valve stenosis     10. Acute renal failure with other specified pathological kidney lesion superimposed on stage 3a chronic kidney disease (HCC)         Medical Decision Making: Today's Assessment/Status/Plan:        76-year-old male with low flow low gradient severe aortic stenosis. I will make referral for him to be followed up in the valve clinic. I think this point he is minimally symptomatic and does not need valve replacement but can defer this for now.  We are awaiting his carotid duplex.  I have verified his medications.  Based on his low resting blood pressure today I will have any room to increase his medications.  I will see him back as scheduled.    1. A-fib with RVR    - cont eliquis    - metop XL 50    - cont amio    2. CHFrEF vs. Valvular cardiomyopathy    - metop per above    - cont sprio 25     3. Aortic stenosis vs. pseudostenosis    - evaluate during PORTILLO    4. CAD    - cont plavix    - cont atorva 40

## 2021-12-30 ENCOUNTER — PHARMACY VISIT (OUTPATIENT)
Dept: PHARMACY | Facility: MEDICAL CENTER | Age: 76
End: 2021-12-30
Payer: MEDICARE

## 2021-12-30 PROCEDURE — RXMED WILLOW AMBULATORY MEDICATION CHARGE: Performed by: INTERNAL MEDICINE

## 2022-01-25 ENCOUNTER — OFFICE VISIT (OUTPATIENT)
Dept: CARDIOLOGY | Facility: MEDICAL CENTER | Age: 77
End: 2022-01-25
Payer: MEDICARE

## 2022-01-25 ENCOUNTER — PHARMACY VISIT (OUTPATIENT)
Dept: PHARMACY | Facility: MEDICAL CENTER | Age: 77
End: 2022-01-25
Payer: MEDICARE

## 2022-01-25 ENCOUNTER — APPOINTMENT (OUTPATIENT)
Dept: CARDIOLOGY | Facility: MEDICAL CENTER | Age: 77
End: 2022-01-25
Payer: MEDICARE

## 2022-01-25 VITALS
BODY MASS INDEX: 26.2 KG/M2 | SYSTOLIC BLOOD PRESSURE: 124 MMHG | DIASTOLIC BLOOD PRESSURE: 82 MMHG | RESPIRATION RATE: 14 BRPM | HEIGHT: 70 IN | WEIGHT: 183 LBS | HEART RATE: 82 BPM | OXYGEN SATURATION: 96 %

## 2022-01-25 DIAGNOSIS — I48.0 PAROXYSMAL ATRIAL FIBRILLATION (HCC): ICD-10-CM

## 2022-01-25 DIAGNOSIS — I35.0 NONRHEUMATIC AORTIC VALVE STENOSIS: ICD-10-CM

## 2022-01-25 DIAGNOSIS — I48.3 TYPICAL ATRIAL FLUTTER (HCC): ICD-10-CM

## 2022-01-25 LAB — EKG IMPRESSION: NORMAL

## 2022-01-25 PROCEDURE — 93000 ELECTROCARDIOGRAM COMPLETE: CPT | Performed by: INTERNAL MEDICINE

## 2022-01-25 PROCEDURE — 99214 OFFICE O/P EST MOD 30 MIN: CPT | Performed by: INTERNAL MEDICINE

## 2022-01-25 PROCEDURE — RXMED WILLOW AMBULATORY MEDICATION CHARGE: Performed by: NURSE PRACTITIONER

## 2022-01-25 PROCEDURE — RXMED WILLOW AMBULATORY MEDICATION CHARGE: Performed by: INTERNAL MEDICINE

## 2022-01-25 ASSESSMENT — FIBROSIS 4 INDEX: FIB4 SCORE: 1.928571428571428571

## 2022-01-25 NOTE — PROGRESS NOTES
"      Cardiology Follow-up Consultation Note        CC: Follow-up aortic stenosis    Patient ID/HPI:   76-year-old male patient with persistent atrial fibrillation, hypertension, hyperlipidemia, coronary artery disease status post PCI aortic stenosis here for follow-up.  Since cardioversion, he has been feeling well denies chest pain, shortness of breath, lower extremity edema.      Past Medical History:   Diagnosis Date   • A-fib (HCC)    • Anxiety    • Breath shortness     with exertion   • Cancer (HCC) 2018    Lip cancer   • Dental disorder     no teeth   • Depression    • Heart burn    • High cholesterol    • History of chronic cough    • Hypertension    • Urinary incontinence          Current Outpatient Medications   Medication Sig Dispense Refill   • spironolactone (ALDACTONE) 25 MG Tab Take 1 Tablet by mouth every day. 100 Tablet 3   • metoprolol SR (TOPROL XL) 50 MG TABLET SR 24 HR Take 3 Tablets by mouth every day. 180 Tablet 5   • atorvastatin (LIPITOR) 40 MG Tab Take 1 tablet by mouth every day. 90 Tablet 3   • apixaban (ELIQUIS) 5mg Tab Take 1 tablet by mouth 2 times a day. 60 Tablet 11   • amiodarone (CORDARONE) 200 MG Tab Take 1 Tablet by mouth every day. 100 Tablet 2   • clopidogrel (PLAVIX) 75 MG Tab Take 1 tablet by mouth every day. 90 Tablet 3   • furosemide (LASIX) 20 MG Tab Take 1 Tablet by mouth 1 time a day as needed. as needed for weight gain greater than 3 pounds in 1 day or 5 pounds in 1 week. 30 Tablet 11     No current facility-administered medications for this visit.         Physical Exam:  Ambulatory Vitals  /82 (BP Location: Left arm, Patient Position: Sitting)   Pulse 82   Resp 14   Ht 1.778 m (5' 10\")   Wt 83 kg (183 lb)   SpO2 96%    Oxygen Therapy:  Pulse Oximetry: 96 %  BP Readings from Last 4 Encounters:   01/25/22 124/82   12/06/21 (!) 98/60   12/02/21 104/64   11/24/21 116/74       Weight/BMI: Body mass index is 26.26 kg/m².  Wt Readings from Last 4 Encounters: "   01/25/22 83 kg (183 lb)   12/06/21 82.1 kg (181 lb)   12/02/21 82.6 kg (182 lb)   11/24/21 81.6 kg (179 lb 12.8 oz)       General: Well appearing and in no apparent distress  Neck: JVP absent, carotid bruits absent  Lungs: respiratory sounds  normal, additional breath sounds absent  Heart: Regular rhythm,   No palpable thrills on palpation, murmurs absent, no rubs,   Lower extremity edema absent.      notes reviewed.  Echocardiogram from November reviewed, personally interpreted.    Impression and Plan:  76-year-old male patient with coronary artery disease status post PCI, hyperlipidemia, hypertension, congestive heart failure with low ejection fraction, aortic stenosis.  His ejection fraction is improving after PCI, medical therapy.  Recommend another limited echocardiogram for evaluating ejection fraction, aortic stenosis.  He has minimal to none symptoms.  I will follow-up with him in 3 months.        Emeka HAYES  Interventional cardiologist  [facility], [address]  [address] [address]  Phone: [phone]  Fax: [phone]

## 2022-02-09 ENCOUNTER — OFFICE VISIT (OUTPATIENT)
Dept: CARDIOLOGY | Facility: MEDICAL CENTER | Age: 77
End: 2022-02-09
Payer: MEDICARE

## 2022-02-09 VITALS
RESPIRATION RATE: 18 BRPM | HEIGHT: 70 IN | BODY MASS INDEX: 26.26 KG/M2 | HEART RATE: 74 BPM | SYSTOLIC BLOOD PRESSURE: 98 MMHG | WEIGHT: 183.4 LBS | OXYGEN SATURATION: 94 % | DIASTOLIC BLOOD PRESSURE: 70 MMHG

## 2022-02-09 DIAGNOSIS — I50.20 HFREF (HEART FAILURE WITH REDUCED EJECTION FRACTION) (HCC): ICD-10-CM

## 2022-02-09 DIAGNOSIS — D73.89 SPLENIC CALCIFICATION: ICD-10-CM

## 2022-02-09 DIAGNOSIS — E78.5 HYPERLIPIDEMIA, UNSPECIFIED HYPERLIPIDEMIA TYPE: ICD-10-CM

## 2022-02-09 DIAGNOSIS — I48.3 TYPICAL ATRIAL FLUTTER (HCC): ICD-10-CM

## 2022-02-09 DIAGNOSIS — N17.8 ACUTE RENAL FAILURE WITH OTHER SPECIFIED PATHOLOGICAL KIDNEY LESION SUPERIMPOSED ON STAGE 3A CHRONIC KIDNEY DISEASE (HCC): ICD-10-CM

## 2022-02-09 DIAGNOSIS — K57.90 DIVERTICULOSIS: ICD-10-CM

## 2022-02-09 DIAGNOSIS — I48.20 CHRONIC ATRIAL FIBRILLATION (HCC): ICD-10-CM

## 2022-02-09 DIAGNOSIS — K44.9 HIATAL HERNIA: ICD-10-CM

## 2022-02-09 DIAGNOSIS — N28.89 RENAL SCARRING: ICD-10-CM

## 2022-02-09 DIAGNOSIS — N18.31 ACUTE RENAL FAILURE WITH OTHER SPECIFIED PATHOLOGICAL KIDNEY LESION SUPERIMPOSED ON STAGE 3A CHRONIC KIDNEY DISEASE (HCC): ICD-10-CM

## 2022-02-09 DIAGNOSIS — F17.200 SMOKING: ICD-10-CM

## 2022-02-09 DIAGNOSIS — I35.0 NONRHEUMATIC AORTIC VALVE STENOSIS: ICD-10-CM

## 2022-02-09 DIAGNOSIS — I10 PRIMARY HYPERTENSION: ICD-10-CM

## 2022-02-09 DIAGNOSIS — I48.0 PAROXYSMAL ATRIAL FIBRILLATION (HCC): ICD-10-CM

## 2022-02-09 DIAGNOSIS — I47.10 SVT (SUPRAVENTRICULAR TACHYCARDIA) (HCC): ICD-10-CM

## 2022-02-09 PROCEDURE — 99214 OFFICE O/P EST MOD 30 MIN: CPT | Performed by: INTERNAL MEDICINE

## 2022-02-09 ASSESSMENT — ENCOUNTER SYMPTOMS
ORTHOPNEA: 0
STRIDOR: 0
BRUISES/BLEEDS EASILY: 0
EYES NEGATIVE: 1
PND: 0
WEAKNESS: 0
SHORTNESS OF BREATH: 0
WHEEZING: 0
CONSTITUTIONAL NEGATIVE: 1
CLAUDICATION: 0
LOSS OF CONSCIOUSNESS: 0
CARDIOVASCULAR NEGATIVE: 1
MUSCULOSKELETAL NEGATIVE: 1
PALPITATIONS: 0
CHILLS: 0
DIZZINESS: 0
SORE THROAT: 0
GASTROINTESTINAL NEGATIVE: 1
COUGH: 0
FEVER: 0
HEMOPTYSIS: 0
RESPIRATORY NEGATIVE: 1
NEUROLOGICAL NEGATIVE: 1
SPUTUM PRODUCTION: 0

## 2022-02-09 ASSESSMENT — FIBROSIS 4 INDEX: FIB4 SCORE: 1.928571428571428571

## 2022-02-09 NOTE — PROGRESS NOTES
Chief Complaint   Patient presents with   • Atrial Fibrillation     F/V Dx: Paroxysmal atrial fibrillation   • Hyperlipidemia   • Supraventricular Tachycardia (SVT)       Subjective     Rajesh De Luna is a 76 y.o. male who presents today as a follow-up for his paroxysmal atrial fibrillation aortic stenosis by blood pressure and heart failure with midrange ejection fraction. Since he was last seen he underwent a dobutamine stress echo showing aortic valve area of 0.8 with a gradient approaching severe. Note the peak velocity was 3.8 m/s.   Physical assignment seen by the valve clinic and is doing well.  Has no chest pain.  He does get some shortness of breath with exertion.  His blood pressure is running a little bit on the low side but he is asymptomatic and walked here today.  Otherwise he said no changes to his clinical status.    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 No pertinent family history.  Social History     Socioeconomic History   • Marital status: Single     Spouse name: Not on file   • Number of children: Not on file   • Years of education: Not on file   • Highest education level: Not on file   Occupational History   • Not on file   Tobacco Use   • Smoking status: Current Some Day Smoker     Types: Cigars   • Smokeless tobacco: Never Used   • Tobacco comment: smoke after dinner   Vaping Use   • Vaping Use: Never used   Substance and Sexual Activity   • Alcohol use: Yes     Comment: reports 2/day   • Drug use: Not  Currently   • Sexual activity: Not on file   Other Topics Concern   • Not on file   Social History Narrative   • Not on file     Social Determinants of Health     Financial Resource Strain:    • Difficulty of Paying Living Expenses: Not on file   Food Insecurity:    • Worried About Running Out of Food in the Last Year: Not on file   • Ran Out of Food in the Last Year: Not on file   Transportation Needs:    • Lack of Transportation (Medical): Not on file   • Lack of Transportation (Non-Medical): Not on file   Physical Activity:    • Days of Exercise per Week: Not on file   • Minutes of Exercise per Session: Not on file   Stress:    • Feeling of Stress : Not on file   Social Connections:    • Frequency of Communication with Friends and Family: Not on file   • Frequency of Social Gatherings with Friends and Family: Not on file   • Attends Mandaen Services: Not on file   • Active Member of Clubs or Organizations: Not on file   • Attends Club or Organization Meetings: Not on file   • Marital Status: Not on file   Intimate Partner Violence:    • Fear of Current or Ex-Partner: Not on file   • Emotionally Abused: Not on file   • Physically Abused: Not on file   • Sexually Abused: Not on file   Housing Stability:    • Unable to Pay for Housing in the Last Year: Not on file   • Number of Places Lived in the Last Year: Not on file   • Unstable Housing in the Last Year: Not on file     Allergies   Allergen Reactions   • Penicillins Hives            Outpatient Encounter Medications as of 2/9/2022   Medication Sig Dispense Refill   • spironolactone (ALDACTONE) 25 MG Tab Take 1 Tablet by mouth every day. 100 Tablet 3   • metoprolol SR (TOPROL XL) 50 MG TABLET SR 24 HR Take 3 Tablets by mouth every day. 180 Tablet 5   • atorvastatin (LIPITOR) 40 MG Tab Take 1 tablet by mouth every day. 90 Tablet 3   • apixaban (ELIQUIS) 5mg Tab Take 1 tablet by mouth 2 times a day. 60 Tablet 11   • amiodarone (CORDARONE) 200 MG Tab Take 1 Tablet  "by mouth every day. 100 Tablet 2   • clopidogrel (PLAVIX) 75 MG Tab Take 1 tablet by mouth every day. 90 Tablet 3   • furosemide (LASIX) 20 MG Tab Take 1 Tablet by mouth 1 time a day as needed. as needed for weight gain greater than 3 pounds in 1 day or 5 pounds in 1 week. 30 Tablet 11     No facility-administered encounter medications on file as of 2/9/2022.     Review of Systems   Constitutional: Negative.  Negative for chills, fever and malaise/fatigue.   HENT: Negative.  Negative for sore throat.    Eyes: Negative.    Respiratory: Negative.  Negative for cough, hemoptysis, sputum production, shortness of breath, wheezing and stridor.    Cardiovascular: Negative.  Negative for chest pain, palpitations, orthopnea, claudication, leg swelling and PND.   Gastrointestinal: Negative.    Genitourinary: Negative.    Musculoskeletal: Negative.    Skin: Negative.    Neurological: Negative.  Negative for dizziness, loss of consciousness and weakness.   Endo/Heme/Allergies: Negative.  Does not bruise/bleed easily.   All other systems reviewed and are negative.             Objective     BP (!) 98/70 (BP Location: Left arm, Patient Position: Sitting, BP Cuff Size: Adult)   Pulse 74   Resp 18   Ht 1.778 m (5' 10\")   Wt 83.2 kg (183 lb 6.4 oz)   SpO2 94%   BMI 26.32 kg/m²     Physical Exam  Vitals and nursing note reviewed.   Constitutional:       General: He is not in acute distress.     Appearance: He is well-developed. He is not diaphoretic.   HENT:      Head: Normocephalic and atraumatic.      Right Ear: External ear normal.      Left Ear: External ear normal.      Nose: Nose normal.      Mouth/Throat:      Pharynx: No oropharyngeal exudate.   Eyes:      General: No scleral icterus.        Right eye: No discharge.         Left eye: No discharge.      Conjunctiva/sclera: Conjunctivae normal.      Pupils: Pupils are equal, round, and reactive to light.   Neck:      Vascular: No JVD.   Cardiovascular:      Rate and " Rhythm: Normal rate and regular rhythm.      Heart sounds: No murmur heard.  No friction rub. No gallop.    Pulmonary:      Effort: Pulmonary effort is normal. No respiratory distress.      Breath sounds: No stridor. No wheezing or rales.   Chest:      Chest wall: No tenderness.   Abdominal:      General: There is no distension.      Palpations: Abdomen is soft.      Tenderness: There is no guarding.   Musculoskeletal:         General: No tenderness or deformity. Normal range of motion.      Cervical back: Neck supple.   Skin:     General: Skin is warm and dry.      Coloration: Skin is not pale.      Findings: No erythema or rash.   Neurological:      Mental Status: He is alert.      Cranial Nerves: No cranial nerve deficit.      Motor: No abnormal muscle tone.      Coordination: Coordination normal.      Deep Tendon Reflexes: Reflexes are normal and symmetric. Reflexes normal.   Psychiatric:         Behavior: Behavior normal.         Thought Content: Thought content normal.         Judgment: Judgment normal.          Echocardiogram: Dated 11/12/2021 personally viewed under myself showing interval improvement in EF to 40% with low flow low gradient aortic stenosis.    CTA: Dated 10/11/2021 personally viewed under myself showing a heavily calcified aortic valve.    Dated 6/12/2021 personally reviewed and interpreted by myself showing:    PREOPERATIVE DIAGNOSIS:  1.  Acute decompensated heart failure with reduced ejection fraction  2.  History of alcohol abuse  3.  Non-STEMI  4.  A. fib RVR     POSTOPERATIVE DIAGNOSIS:  1.  99% culprit LCx stenosis  2.  Calcific 50 to 60% proximal LAD stenosis and moderate nonobstructive RCA disease  3.  LVEDP 16 mmHg  4.  Successful PCI culprit LCx with [facility]    Lab Results   Component Value Date/Time    CHOLSTRLTOT 108 06/13/2021 02:35 AM    LDL 53 06/13/2021 02:35 AM    HDL 40 06/13/2021 02:35 AM    TRIGLYCERIDE 75 06/13/2021 02:35 AM       Lab Results   Component Value  Date/Time    SODIUM 140 11/20/2021 12:43 AM    POTASSIUM 4.1 11/20/2021 12:43 AM    CHLORIDE 105 11/20/2021 12:43 AM    CO2 23 11/20/2021 12:43 AM    GLUCOSE 86 11/20/2021 12:43 AM    BUN 27 (H) 11/20/2021 12:43 AM    CREATININE 1.33 11/20/2021 12:43 AM     Lab Results   Component Value Date/Time    ALKPHOSPHAT 82 11/19/2021 03:36 PM    ASTSGOT 18 11/19/2021 03:36 PM    ALTSGPT 16 11/19/2021 03:36 PM    TBILIRUBIN 0.4 11/19/2021 03:36 PM          Assessment & Plan     1. Paroxysmal atrial fibrillation     2. Hyperlipidemia, unspecified hyperlipidemia type     3. Primary hypertension     4. Chronic atrial fibrillation (HCC)     5. SVT (supraventricular tachycardia) (HCC)     6. HFrEF     7. Smoking     8. Diverticulosis     9. Hiatal hernia     10. Renal scarring     11. Splenic calcification     12. Typical atrial flutter (HCC)     13. Nonrheumatic aortic valve stenosis     14. Acute renal failure with other specified pathological kidney lesion superimposed on stage 3a chronic kidney disease (HCC)         Medical Decision Making: Today's Assessment/Status/Plan:        76-year-old male with low flow low gradient severe aortic stenosis.  I am awaiting his repeat echocardiogram.  I will make any changes with medical therapy.  I refilled his medications.  I will see him back again in 6 months.      1. A-fib with RVR    - cont eliquis    - metop XL 50    - cont amio    2. CHFrEF vs. Valvular cardiomyopathy    - metop per above    - cont sprio 25     3. Aortic stenosis vs. pseudostenosis    - awaiting echo    4. CAD    - cont plavix    - cont atorva 40

## 2022-02-18 ENCOUNTER — PHARMACY VISIT (OUTPATIENT)
Dept: PHARMACY | Facility: MEDICAL CENTER | Age: 77
End: 2022-02-18
Payer: MEDICARE

## 2022-02-18 PROCEDURE — RXMED WILLOW AMBULATORY MEDICATION CHARGE: Performed by: INTERNAL MEDICINE

## 2022-02-18 PROCEDURE — RXMED WILLOW AMBULATORY MEDICATION CHARGE: Performed by: NURSE PRACTITIONER

## 2022-03-24 ENCOUNTER — PHARMACY VISIT (OUTPATIENT)
Dept: PHARMACY | Facility: MEDICAL CENTER | Age: 77
End: 2022-03-24
Payer: MEDICARE

## 2022-03-24 PROCEDURE — RXMED WILLOW AMBULATORY MEDICATION CHARGE: Performed by: INTERNAL MEDICINE

## 2022-03-24 PROCEDURE — RXMED WILLOW AMBULATORY MEDICATION CHARGE: Performed by: NURSE PRACTITIONER

## 2022-04-04 ENCOUNTER — HOSPITAL ENCOUNTER (OUTPATIENT)
Dept: CARDIOLOGY | Facility: MEDICAL CENTER | Age: 77
End: 2022-04-04
Attending: INTERNAL MEDICINE
Payer: MEDICARE

## 2022-04-04 DIAGNOSIS — I35.0 NONRHEUMATIC AORTIC VALVE STENOSIS: ICD-10-CM

## 2022-04-04 LAB
LV EJECT FRACT  99904: 55
LV EJECT FRACT MOD 2C 99903: 59.21
LV EJECT FRACT MOD 4C 99902: 54.09
LV EJECT FRACT MOD BP 99901: 54.98

## 2022-04-04 PROCEDURE — 93306 TTE W/DOPPLER COMPLETE: CPT

## 2022-04-04 PROCEDURE — 93306 TTE W/DOPPLER COMPLETE: CPT | Mod: 26 | Performed by: INTERNAL MEDICINE

## 2022-04-22 ENCOUNTER — PHARMACY VISIT (OUTPATIENT)
Dept: PHARMACY | Facility: MEDICAL CENTER | Age: 77
End: 2022-04-22
Payer: MEDICARE

## 2022-04-22 DIAGNOSIS — Z79.01 CHRONIC ANTICOAGULATION: ICD-10-CM

## 2022-04-22 PROCEDURE — RXMED WILLOW AMBULATORY MEDICATION CHARGE: Performed by: INTERNAL MEDICINE

## 2022-04-22 PROCEDURE — RXMED WILLOW AMBULATORY MEDICATION CHARGE: Performed by: NURSE PRACTITIONER

## 2022-04-22 RX ORDER — CLOPIDOGREL BISULFATE 75 MG/1
75 TABLET ORAL DAILY
Qty: 90 TABLET | Refills: 3 | Status: SHIPPED | OUTPATIENT
Start: 2022-04-22 | End: 2023-06-07

## 2022-04-26 ENCOUNTER — OFFICE VISIT (OUTPATIENT)
Dept: CARDIOLOGY | Facility: MEDICAL CENTER | Age: 77
End: 2022-04-26
Payer: MEDICARE

## 2022-04-26 VITALS
OXYGEN SATURATION: 97 % | SYSTOLIC BLOOD PRESSURE: 112 MMHG | DIASTOLIC BLOOD PRESSURE: 68 MMHG | HEART RATE: 73 BPM | BODY MASS INDEX: 26.77 KG/M2 | WEIGHT: 187 LBS | HEIGHT: 70 IN | RESPIRATION RATE: 14 BRPM

## 2022-04-26 DIAGNOSIS — I35.0 MODERATE AORTIC STENOSIS: ICD-10-CM

## 2022-04-26 PROCEDURE — 99214 OFFICE O/P EST MOD 30 MIN: CPT | Performed by: INTERNAL MEDICINE

## 2022-04-26 ASSESSMENT — FIBROSIS 4 INDEX: FIB4 SCORE: 1.928571428571428571

## 2022-04-26 NOTE — PROGRESS NOTES
"      Cardiology Follow-up Consultation Note        CC: Follow-up aortic stenosis    Patient ID/HPI:   76-year-old male patient with hypertension, hyperlipidemia, persistent atrial fibrillation status post cardioversion, cardiomyopathy, coronary disease s/p PCI of left circumflex artery, aortic stenosis here for follow-up.  He feels well denies chest pain, palpitations.  He has gained few pounds due to excessive eating.        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 90 Tablet 3   • spironolactone (ALDACTONE) 25 MG Tab Take 1 Tablet by mouth every day. 100 Tablet 3   • metoprolol SR (TOPROL XL) 50 MG TABLET SR 24 HR Take 3 Tablets by mouth every day. 180 Tablet 5   • atorvastatin (LIPITOR) 40 MG Tab Take 1 tablet by mouth every day. 90 Tablet 3   • apixaban (ELIQUIS) 5mg Tab Take 1 tablet by mouth 2 times a day. 60 Tablet 11   • amiodarone (CORDARONE) 200 MG Tab Take 1 Tablet by mouth every day. 100 Tablet 2   • furosemide (LASIX) 20 MG Tab Take 1 Tablet by mouth 1 time a day as needed. as needed for weight gain greater than 3 pounds in 1 day or 5 pounds in 1 week. 30 Tablet 11     No current facility-administered medications for this visit.         Physical Exam:  Ambulatory Vitals  /68 (BP Location: Left arm, Patient Position: Sitting, BP Cuff Size: Adult)   Pulse 73   Resp 14   Ht 1.778 m (5' 10\")   Wt 84.8 kg (187 lb)   SpO2 97%    Oxygen Therapy:  Pulse Oximetry: 97 %  BP Readings from Last 4 Encounters:   04/26/22 112/68   02/09/22 (!) 98/70   01/25/22 124/82   12/06/21 (!) 98/60       Weight/BMI: Body " mass index is 26.83 kg/m².  Wt Readings from Last 4 Encounters:   04/26/22 84.8 kg (187 lb)   02/09/22 83.2 kg (183 lb 6.4 oz)   01/25/22 83 kg (183 lb)   12/06/21 82.1 kg (181 lb)     General: Well appearing and in no apparent distress  Head: atrumatic  Eyes: No conjunctival pallor   ENT: normal external appearance of nose and ears  Neck: JVD absent, carotid bruits absent  Lungs: respiratory sounds  normal, additional breath sounds absent  Heart: Regular rhythm,   No palpable thrills on palpation, 3/6 systolic murmur aortic area, no rubs,   Lower extremity edema absent.       Echocardiogram 4/4/2022 reviewed, personally interpreted shows normal LV function, moderate aortic stenosis.      Impression and Plan:  76-year-old male patient with hypertension, hyperlipidemia, persistent atrial fibrillation status post cardioversion, cardiomyopathy, coronary disease s/p PCI of left circumflex artery, aortic stenosis here for follow-up.     His ejection fraction improved with medical therapy.  It is more clear now his aortic stenosis is moderate.  Will continue current medical therapy.      He will continue to follow-up with .  We will see him back in valve clinic when/if his aortic stenosis progressed to severe.        Emeka HAYES  Interventional cardiologist  [facility]  [address] [address]  Phone: [phone]  Fax: [phone]

## 2022-05-10 ENCOUNTER — OFFICE VISIT (OUTPATIENT)
Dept: CARDIOLOGY | Facility: MEDICAL CENTER | Age: 77
End: 2022-05-10
Payer: MEDICARE

## 2022-05-10 VITALS
OXYGEN SATURATION: 93 % | RESPIRATION RATE: 15 BRPM | WEIGHT: 192.2 LBS | BODY MASS INDEX: 27.52 KG/M2 | HEART RATE: 79 BPM | HEIGHT: 70 IN | SYSTOLIC BLOOD PRESSURE: 98 MMHG | DIASTOLIC BLOOD PRESSURE: 60 MMHG

## 2022-05-10 DIAGNOSIS — I50.20 HFREF (HEART FAILURE WITH REDUCED EJECTION FRACTION) (HCC): ICD-10-CM

## 2022-05-10 DIAGNOSIS — E78.5 HYPERLIPIDEMIA, UNSPECIFIED HYPERLIPIDEMIA TYPE: ICD-10-CM

## 2022-05-10 DIAGNOSIS — I48.0 PAROXYSMAL ATRIAL FIBRILLATION (HCC): ICD-10-CM

## 2022-05-10 DIAGNOSIS — I48.20 CHRONIC ATRIAL FIBRILLATION (HCC): ICD-10-CM

## 2022-05-10 DIAGNOSIS — I47.10 SVT (SUPRAVENTRICULAR TACHYCARDIA) (HCC): ICD-10-CM

## 2022-05-10 DIAGNOSIS — I48.3 TYPICAL ATRIAL FLUTTER (HCC): ICD-10-CM

## 2022-05-10 DIAGNOSIS — F17.200 SMOKING: ICD-10-CM

## 2022-05-10 DIAGNOSIS — I35.0 NONRHEUMATIC AORTIC VALVE STENOSIS: ICD-10-CM

## 2022-05-10 DIAGNOSIS — I10 PRIMARY HYPERTENSION: ICD-10-CM

## 2022-05-10 PROCEDURE — 99215 OFFICE O/P EST HI 40 MIN: CPT | Performed by: INTERNAL MEDICINE

## 2022-05-10 RX ORDER — AMIODARONE HYDROCHLORIDE 200 MG/1
200 TABLET ORAL DAILY
Qty: 100 TABLET | Refills: 2 | Status: SHIPPED | OUTPATIENT
Start: 2022-05-10 | End: 2023-02-02 | Stop reason: SDUPTHER

## 2022-05-10 ASSESSMENT — ENCOUNTER SYMPTOMS
PALPITATIONS: 0
WEAKNESS: 0
PND: 0
GASTROINTESTINAL NEGATIVE: 1
SORE THROAT: 0
ORTHOPNEA: 0
HEMOPTYSIS: 0
FEVER: 0
COUGH: 0
EYES NEGATIVE: 1
CLAUDICATION: 0
MUSCULOSKELETAL NEGATIVE: 1
WHEEZING: 0
LOSS OF CONSCIOUSNESS: 0
NEUROLOGICAL NEGATIVE: 1
SPUTUM PRODUCTION: 0
DIZZINESS: 0
SHORTNESS OF BREATH: 0
STRIDOR: 0
CARDIOVASCULAR NEGATIVE: 1
CONSTITUTIONAL NEGATIVE: 1
RESPIRATORY NEGATIVE: 1
CHILLS: 0
BRUISES/BLEEDS EASILY: 0

## 2022-05-10 ASSESSMENT — FIBROSIS 4 INDEX: FIB4 SCORE: 1.928571428571428571

## 2022-05-10 NOTE — PROGRESS NOTES
Chief Complaint   Patient presents with   • Atrial Fibrillation     F/V DX: Paroxysmal atrial fibrillation        Subjective     Rajesh De Luna is a 76 y.o. male who presents today as a follow-up for his paroxysmal atrial fibrillation aortic stenosis by blood pressure and heart failure with midrange ejection fraction. Since he was last seen he underwent a dobutamine stress echo showing aortic valve area of 0.8 with a gradient approaching severe. Note the peak velocity was 3.8 m/s.     Since he was last seen he is EF normalized.  He continues to have likely moderate to severe aortic stenosis but remains asymptomatic.  He walked in well today with no functional limitations.  His A. fib is now controlled.  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 No pertinent family history.  Social History     Socioeconomic History   • Marital status: Single     Spouse name: Not on file   • Number of children: Not on file   • Years of education: Not on file   • Highest education level: Not on file   Occupational History   • Not on file   Tobacco Use   • Smoking status: Current Some Day Smoker     Types: Cigars   • Smokeless tobacco: Never Used   • Tobacco comment: smoke after dinner   Vaping Use   • Vaping Use: Never used   Substance and Sexual Activity   • Alcohol use: Yes     Comment: reports 2/day   • Drug use: Not Currently   • Sexual activity: Not on file   Other Topics Concern   • Not on file   Social History Narrative   • Not on file     Social  Determinants of Health     Financial Resource Strain: Not on file   Food Insecurity: Not on file   Transportation Needs: Not on file   Physical Activity: Not on file   Stress: Not on file   Social Connections: Not on file   Intimate Partner Violence: Not on file   Housing Stability: Not on file     Allergies   Allergen Reactions   • Penicillins Hives            Outpatient Encounter Medications as of 5/10/2022   Medication Sig Dispense Refill   • amiodarone (CORDARONE) 200 MG Tab Take 1 Tablet by mouth every day. 100 Tablet 2   • clopidogrel (PLAVIX) 75 MG Tab Take 1 tablet by mouth every day. 90 Tablet 3   • spironolactone (ALDACTONE) 25 MG Tab Take 1 Tablet by mouth every day. 100 Tablet 3   • metoprolol SR (TOPROL XL) 50 MG TABLET SR 24 HR Take 3 Tablets by mouth every day. 180 Tablet 5   • atorvastatin (LIPITOR) 40 MG Tab Take 1 tablet by mouth every day. 90 Tablet 3   • apixaban (ELIQUIS) 5mg Tab Take 1 tablet by mouth 2 times a day. 60 Tablet 11   • furosemide (LASIX) 20 MG Tab Take 1 Tablet by mouth 1 time a day as needed. as needed for weight gain greater than 3 pounds in 1 day or 5 pounds in 1 week. 30 Tablet 11   • [DISCONTINUED] amiodarone (CORDARONE) 200 MG Tab Take 1 Tablet by mouth every day. 100 Tablet 2     No facility-administered encounter medications on file as of 5/10/2022.     Review of Systems   Constitutional: Negative.  Negative for chills, fever and malaise/fatigue.   HENT: Negative.  Negative for sore throat.    Eyes: Negative.    Respiratory: Negative.  Negative for cough, hemoptysis, sputum production, shortness of breath, wheezing and stridor.    Cardiovascular: Negative.  Negative for chest pain, palpitations, orthopnea, claudication, leg swelling and PND.   Gastrointestinal: Negative.    Genitourinary: Negative.    Musculoskeletal: Negative.    Skin: Negative.    Neurological: Negative.  Negative for dizziness, loss of consciousness and weakness.   Endo/Heme/Allergies: Negative.  Does  "not bruise/bleed easily.   All other systems reviewed and are negative.             Objective     BP (!) 98/60 (BP Location: Left arm, Patient Position: Sitting, BP Cuff Size: Adult)   Pulse 79   Resp 15   Ht 1.778 m (5' 10\")   Wt 87.2 kg (192 lb 3.2 oz)   SpO2 93%   BMI 27.58 kg/m²     Physical Exam  Vitals and nursing note reviewed.   Constitutional:       General: He is not in acute distress.     Appearance: He is well-developed. He is not diaphoretic.   HENT:      Head: Normocephalic and atraumatic.      Right Ear: External ear normal.      Left Ear: External ear normal.      Nose: Nose normal.      Mouth/Throat:      Pharynx: No oropharyngeal exudate.   Eyes:      General: No scleral icterus.        Right eye: No discharge.         Left eye: No discharge.      Conjunctiva/sclera: Conjunctivae normal.      Pupils: Pupils are equal, round, and reactive to light.   Neck:      Vascular: No JVD.   Cardiovascular:      Rate and Rhythm: Normal rate and regular rhythm.      Heart sounds: No murmur heard.    No friction rub. No gallop.   Pulmonary:      Effort: Pulmonary effort is normal. No respiratory distress.      Breath sounds: No stridor. No wheezing or rales.   Chest:      Chest wall: No tenderness.   Abdominal:      General: There is no distension.      Palpations: Abdomen is soft.      Tenderness: There is no guarding.   Musculoskeletal:         General: No tenderness or deformity. Normal range of motion.      Cervical back: Neck supple.   Skin:     General: Skin is warm and dry.      Coloration: Skin is not pale.      Findings: No erythema or rash.   Neurological:      Mental Status: He is alert.      Cranial Nerves: No cranial nerve deficit.      Motor: No abnormal muscle tone.      Coordination: Coordination normal.      Deep Tendon Reflexes: Reflexes are normal and symmetric. Reflexes normal.   Psychiatric:         Behavior: Behavior normal.         Thought Content: Thought content normal.         " Judgment: Judgment normal.          Echocardiogram: Dated 11/12/2021 personally viewed under myself showing interval improvement in EF to 40% with low flow low gradient aortic stenosis.    CTA: Dated 10/11/2021 personally viewed under myself showing a heavily calcified aortic valve.    Dated 6/12/2021 personally reviewed and interpreted by myself showing:    PREOPERATIVE DIAGNOSIS:  1.  Acute decompensated heart failure with reduced ejection fraction  2.  History of alcohol abuse  3.  Non-STEMI  4.  A. fib RVR     POSTOPERATIVE DIAGNOSIS:  1.  99% culprit LCx stenosis  2.  Calcific 50 to 60% proximal LAD stenosis and moderate nonobstructive RCA disease  3.  LVEDP 16 mmHg  4.  Successful PCI culprit LCx with Cecilio DONOVAN    Lab Results   Component Value Date/Time    CHOLSTRLTOT 108 06/13/2021 02:35 AM    LDL 53 06/13/2021 02:35 AM    HDL 40 06/13/2021 02:35 AM    TRIGLYCERIDE 75 06/13/2021 02:35 AM       Lab Results   Component Value Date/Time    SODIUM 140 11/20/2021 12:43 AM    POTASSIUM 4.1 11/20/2021 12:43 AM    CHLORIDE 105 11/20/2021 12:43 AM    CO2 23 11/20/2021 12:43 AM    GLUCOSE 86 11/20/2021 12:43 AM    BUN 27 (H) 11/20/2021 12:43 AM    CREATININE 1.33 11/20/2021 12:43 AM     Lab Results   Component Value Date/Time    ALKPHOSPHAT 82 11/19/2021 03:36 PM    ASTSGOT 18 11/19/2021 03:36 PM    ALTSGPT 16 11/19/2021 03:36 PM    TBILIRUBIN 0.4 11/19/2021 03:36 PM          Assessment & Plan     1. Nonrheumatic aortic valve stenosis     2. Typical atrial flutter (HCC)  CBC W/ DIFF W/O PLATELETS    Comp Metabolic Panel    TSH WITH REFLEX TO FT4   3. HFrEF  amiodarone (CORDARONE) 200 MG Tab    CBC W/ DIFF W/O PLATELETS    Comp Metabolic Panel    TSH WITH REFLEX TO FT4   4. SVT (supraventricular tachycardia) (HCC)  amiodarone (CORDARONE) 200 MG Tab    CBC W/ DIFF W/O PLATELETS    Comp Metabolic Panel    TSH WITH REFLEX TO FT4   5. Chronic atrial fibrillation (HCC)  amiodarone (CORDARONE) 200 MG Tab    CBC W/ DIFF W/O  PLATELETS    Comp Metabolic Panel    TSH WITH REFLEX TO FT4   6. Primary hypertension  amiodarone (CORDARONE) 200 MG Tab    CBC W/ DIFF W/O PLATELETS    Comp Metabolic Panel    TSH WITH REFLEX TO FT4   7. Paroxysmal atrial fibrillation     8. Hyperlipidemia, unspecified hyperlipidemia type  amiodarone (CORDARONE) 200 MG Tab   9. Smoking  amiodarone (CORDARONE) 200 MG Tab       Medical Decision Making: Today's Assessment/Status/Plan:        76-year-old male with low flow low gradient severe aortic stenosis.  In terms of his aortic stenosis he remains asymptomatic.  We will keep him on the same medications.  I will check his labs for his high risk medications.  I will see him back in 6 months.    1. A-fib with RVR    - cont eliquis    - metop XL 50    - cont amio    2. CHFrEF vs. Valvular cardiomyopathy    - metop per above    - cont sprio 25     3. Aortic stenosis vs. pseudostenosis    - awaiting echo    4. CAD    - cont plavix    - cont atorva 40

## 2022-05-12 ENCOUNTER — PHARMACY VISIT (OUTPATIENT)
Dept: PHARMACY | Facility: MEDICAL CENTER | Age: 77
End: 2022-05-12
Payer: MEDICARE

## 2022-05-12 PROCEDURE — RXMED WILLOW AMBULATORY MEDICATION CHARGE: Performed by: INTERNAL MEDICINE

## 2022-07-19 PROCEDURE — RXMED WILLOW AMBULATORY MEDICATION CHARGE: Performed by: INTERNAL MEDICINE

## 2022-07-19 PROCEDURE — RXMED WILLOW AMBULATORY MEDICATION CHARGE: Performed by: NURSE PRACTITIONER

## 2022-07-21 ENCOUNTER — PHARMACY VISIT (OUTPATIENT)
Dept: PHARMACY | Facility: MEDICAL CENTER | Age: 77
End: 2022-07-21
Payer: MEDICARE

## 2022-10-07 ENCOUNTER — PHARMACY VISIT (OUTPATIENT)
Dept: PHARMACY | Facility: MEDICAL CENTER | Age: 77
End: 2022-10-07
Payer: MEDICARE

## 2022-10-07 DIAGNOSIS — I47.10 SVT (SUPRAVENTRICULAR TACHYCARDIA) (HCC): ICD-10-CM

## 2022-10-07 PROCEDURE — RXMED WILLOW AMBULATORY MEDICATION CHARGE: Performed by: INTERNAL MEDICINE

## 2022-10-10 RX ORDER — FUROSEMIDE 20 MG/1
20 TABLET ORAL
Qty: 90 TABLET | Refills: 3 | Status: SHIPPED | OUTPATIENT
Start: 2022-10-10 | End: 2023-12-12 | Stop reason: SDUPTHER

## 2022-10-10 NOTE — TELEPHONE ENCOUNTER
Is the patient due for a refill? Yes    Was the patient seen the past year? Yes    Date of last office visit: 5/10/22    Does the patient have an upcoming appointment?  Yes   If yes, When? 11/14/22    Provider to refill:RO    Does the patients insurance require a 100 day supply?  No

## 2022-11-16 ENCOUNTER — TELEPHONE (OUTPATIENT)
Dept: CARDIOLOGY | Facility: MEDICAL CENTER | Age: 77
End: 2022-11-16
Payer: MEDICARE

## 2022-11-16 NOTE — TELEPHONE ENCOUNTER
RO    Called pt to reschedule, phone numbers provided belong to apt complex and the other is invalid. Next time patient comes in we need an updated number.   Was unable to leave voicemail.    Thank you

## 2022-11-22 DIAGNOSIS — I47.10 SVT (SUPRAVENTRICULAR TACHYCARDIA) (HCC): ICD-10-CM

## 2022-11-22 DIAGNOSIS — I48.20 CHRONIC ATRIAL FIBRILLATION (HCC): ICD-10-CM

## 2022-11-22 DIAGNOSIS — I10 PRIMARY HYPERTENSION: ICD-10-CM

## 2022-11-22 DIAGNOSIS — I50.20 HFREF (HEART FAILURE WITH REDUCED EJECTION FRACTION) (HCC): ICD-10-CM

## 2022-11-22 DIAGNOSIS — I10 HYPERTENSION, UNSPECIFIED TYPE: ICD-10-CM

## 2022-11-22 DIAGNOSIS — F17.200 SMOKING: ICD-10-CM

## 2022-11-22 PROCEDURE — RXMED WILLOW AMBULATORY MEDICATION CHARGE: Performed by: NURSE PRACTITIONER

## 2022-11-23 ENCOUNTER — PHARMACY VISIT (OUTPATIENT)
Dept: PHARMACY | Facility: MEDICAL CENTER | Age: 77
End: 2022-11-23
Payer: MEDICARE

## 2022-11-23 PROCEDURE — RXMED WILLOW AMBULATORY MEDICATION CHARGE: Performed by: NURSE PRACTITIONER

## 2022-11-23 RX ORDER — ATORVASTATIN CALCIUM 40 MG/1
40 TABLET, FILM COATED ORAL DAILY
Qty: 90 TABLET | Refills: 1 | Status: SHIPPED | OUTPATIENT
Start: 2022-11-23 | End: 2023-12-12 | Stop reason: SDUPTHER

## 2022-11-23 NOTE — TELEPHONE ENCOUNTER
Is the patient due for a refill? Yes    Was the patient seen the past year? Yes    Date of last office visit: 5/10/22    Does the patient have an upcoming appointment?  No   If yes, When?     Provider to refill:RO    Does the patients insurance require a 100 day supply?  No

## 2022-12-01 ENCOUNTER — NON-PROVIDER VISIT (OUTPATIENT)
Dept: CARDIOLOGY | Facility: MEDICAL CENTER | Age: 77
End: 2022-12-01
Payer: MEDICARE

## 2022-12-01 VITALS
WEIGHT: 186 LBS | HEART RATE: 80 BPM | SYSTOLIC BLOOD PRESSURE: 104 MMHG | BODY MASS INDEX: 26.69 KG/M2 | DIASTOLIC BLOOD PRESSURE: 66 MMHG

## 2022-12-01 DIAGNOSIS — I47.10 SVT (SUPRAVENTRICULAR TACHYCARDIA) (HCC): ICD-10-CM

## 2022-12-01 DIAGNOSIS — I48.3 TYPICAL ATRIAL FLUTTER (HCC): ICD-10-CM

## 2022-12-01 DIAGNOSIS — I48.20 CHRONIC ATRIAL FIBRILLATION (HCC): ICD-10-CM

## 2022-12-01 PROCEDURE — 99211 OFF/OP EST MAY X REQ PHY/QHP: CPT | Performed by: INTERNAL MEDICINE

## 2022-12-01 ASSESSMENT — FIBROSIS 4 INDEX: FIB4 SCORE: 1.95

## 2022-12-01 NOTE — PROGRESS NOTES
CHF Pharmacotherapy visit - Visit    Date of Service: 12/1/22    Informed written consent was given on: 7/29/21    Rajesh De Luna is here for CHF     HPI  Pertinent Interval History since last visit:   Last seen 09/16/2021  Has been having difficulty sleeping, SOB, loss of appetite.   However lost 6 lbs since last visit; denies fluid retention  Pt missed Dr Ruiz's appt d/t illness - coughing up phlegm    Most recent EF:  55% April 2022 (increased from 40%)      Current Outpatient Medications:     Eliquis, 5 mg, Oral, BID    atorvastatin, 40 mg, Oral, DAILY    furosemide, 20 mg, Oral, QDAY PRN    amiodarone, 200 mg, Oral, DAILY    clopidogrel, 75 mg, Oral, DAILY    spironolactone, 25 mg, Oral, DAILY    metoprolol SR, 150 mg, Oral, DAILY    Current Adherence to CHF Therapies:  Complete    Current CHF Medications - including dose:   Entresto or ACE/ARB: none  Beta blocker: metoprolol sr 150 mg daily  Diuretic: furosemide 20 mg daily  Aldosterone antagonist: spironolactone 25 mg daily  SGLT2i: none    Home BP and HR:  Unable to recall readings    Change in weight: Lost 6 lbs since last recorded visit    Exercise habits:  walking 1 mile/week      Diet: Recently loss of appetite    SOCIAL HISTORY  Social History     Tobacco Use   Smoking Status Some Days    Types: Cigars   Smokeless Tobacco Never   Tobacco Comments    smoke after dinner        DATA REVIEW  Vitals:    12/01/22 0930   BP: 104/66   Pulse: 80       No results found for: HBA1C       Lab Results   Component Value Date/Time    CHOLSTRLTOT 108 06/13/2021 02:35 AM    LDL 53 06/13/2021 02:35 AM    HDL 40 06/13/2021 02:35 AM    TRIGLYCERIDE 75 06/13/2021 02:35 AM       Lab Results   Component Value Date/Time    SODIUM 140 11/20/2021 12:43 AM    POTASSIUM 4.1 11/20/2021 12:43 AM    CHLORIDE 105 11/20/2021 12:43 AM    CO2 23 11/20/2021 12:43 AM    GLUCOSE 86 11/20/2021 12:43 AM    BUN 27 (H) 11/20/2021 12:43 AM    CREATININE 1.33 11/20/2021 12:43 AM     Lab  Results   Component Value Date/Time    ALKPHOSPHAT 82 11/19/2021 03:36 PM    ASTSGOT 18 11/19/2021 03:36 PM    ALTSGPT 16 11/19/2021 03:36 PM    TBILIRUBIN 0.4 11/19/2021 03:36 PM    INR 1.29 (H) 08/09/2021 09:51 AM    ALBUMIN 4.4 11/19/2021 03:36 PM      No components found for: MICROALBUMINCREATRATIOURINE    Renal function:  Calculated creatinine clearance: 83 ml/min     Other Pertinent Blood Work:     Other:  Immunization History   Administered Date(s) Administered    Pneumococcal polysaccharide vaccine (PPSV-23) 09/23/2015     Up to date on pneumococcal vaccine? yes    Recent Imaging Studies:    Recent imaging studies, including ECHO, were available in EMR and reviewed with patient at today's visit      ASSESSMENT AND PLAN  See pertinent hx above. Pt denies chest pain. Due to his hx of Afib, he is requesting outpatient EKG.   BP is borderline low  HR is wnl  Pt lost 6 lbs since last visit    Resulting CHF medications today (changes are bolded)  Entresto or ACE/ARB: none  Beta blocker: metoprolol sr 150 mg daily  Diuretic: furosemide 20 mg daily  Aldosterone antagonist: spironolactone 25 mg daily  SGLT2i: none    Lifestyle Recommendations From Today's Visit:   Continue to limit fluid intake to 2L/day and Na+ intake to 2gm/day    Blood Work Ordered At Today's visit:   Pending labs ordered by Dr Ruiz - pt to reschedule missed appt w/ Dr Zhou    Studies Ordered at Todays Visit:  EKG     Follow-Up:   EKG appt on 12/5  Pharmacy visit in 1 month    Shadia Parks, PharmD

## 2022-12-05 ENCOUNTER — TELEPHONE (OUTPATIENT)
Dept: CARDIOLOGY | Facility: MEDICAL CENTER | Age: 77
End: 2022-12-05

## 2022-12-05 ENCOUNTER — NON-PROVIDER VISIT (OUTPATIENT)
Dept: CARDIOLOGY | Facility: MEDICAL CENTER | Age: 77
End: 2022-12-05
Payer: MEDICARE

## 2022-12-05 DIAGNOSIS — I48.0 PAROXYSMAL ATRIAL FIBRILLATION (HCC): ICD-10-CM

## 2022-12-05 LAB — EKG IMPRESSION: NORMAL

## 2022-12-05 PROCEDURE — 93000 ELECTROCARDIOGRAM COMPLETE: CPT | Performed by: INTERNAL MEDICINE

## 2022-12-05 NOTE — NON-PROVIDER
"RN reviewed EKG of patient. Patient stated, \"I feel good today. Just have some fatigue at times.\" Denies any other symptoms.     RN informed patient that she would send EKG to Dr. TANNER for final review and for patient to come to next follow up appointment on 1/26/2023. Patient verbalized understanding. Patient escorted to check out desk with RN.     Message sent to CIRO that EKG was completed and to please advise.   "

## 2023-01-12 ENCOUNTER — NON-PROVIDER VISIT (OUTPATIENT)
Dept: CARDIOLOGY | Facility: MEDICAL CENTER | Age: 78
End: 2023-01-12
Payer: MEDICARE

## 2023-01-12 ENCOUNTER — HOSPITAL ENCOUNTER (OUTPATIENT)
Dept: LAB | Facility: MEDICAL CENTER | Age: 78
End: 2023-01-12
Attending: INTERNAL MEDICINE
Payer: MEDICARE

## 2023-01-12 ENCOUNTER — TELEPHONE (OUTPATIENT)
Dept: HEALTH INFORMATION MANAGEMENT | Facility: OTHER | Age: 78
End: 2023-01-12

## 2023-01-12 VITALS
HEART RATE: 70 BPM | DIASTOLIC BLOOD PRESSURE: 61 MMHG | WEIGHT: 189 LBS | SYSTOLIC BLOOD PRESSURE: 88 MMHG | BODY MASS INDEX: 27.12 KG/M2

## 2023-01-12 DIAGNOSIS — E78.5 HYPERLIPIDEMIA, UNSPECIFIED HYPERLIPIDEMIA TYPE: ICD-10-CM

## 2023-01-12 DIAGNOSIS — I50.20 HFREF (HEART FAILURE WITH REDUCED EJECTION FRACTION) (HCC): ICD-10-CM

## 2023-01-12 DIAGNOSIS — I48.3 TYPICAL ATRIAL FLUTTER (HCC): ICD-10-CM

## 2023-01-12 DIAGNOSIS — I10 PRIMARY HYPERTENSION: ICD-10-CM

## 2023-01-12 DIAGNOSIS — I47.10 SVT (SUPRAVENTRICULAR TACHYCARDIA) (HCC): ICD-10-CM

## 2023-01-12 DIAGNOSIS — I48.20 CHRONIC ATRIAL FIBRILLATION (HCC): ICD-10-CM

## 2023-01-12 DIAGNOSIS — I50.9 HEART FAILURE, NYHA CLASS 3 (HCC): ICD-10-CM

## 2023-01-12 LAB
ALBUMIN SERPL BCP-MCNC: 4.3 G/DL (ref 3.2–4.9)
ALBUMIN/GLOB SERPL: 1.4 G/DL
ALP SERPL-CCNC: 111 U/L (ref 30–99)
ALT SERPL-CCNC: 10 U/L (ref 2–50)
ANION GAP SERPL CALC-SCNC: 12 MMOL/L (ref 7–16)
AST SERPL-CCNC: 21 U/L (ref 12–45)
BASOPHILS # BLD AUTO: 1 % (ref 0–1.8)
BASOPHILS # BLD: 0.08 K/UL (ref 0–0.12)
BILIRUB SERPL-MCNC: 0.3 MG/DL (ref 0.1–1.5)
BUN SERPL-MCNC: 25 MG/DL (ref 8–22)
CALCIUM ALBUM COR SERPL-MCNC: 9.1 MG/DL (ref 8.5–10.5)
CALCIUM SERPL-MCNC: 9.3 MG/DL (ref 8.5–10.5)
CHLORIDE SERPL-SCNC: 102 MMOL/L (ref 96–112)
CO2 SERPL-SCNC: 26 MMOL/L (ref 20–33)
CREAT SERPL-MCNC: 1.64 MG/DL (ref 0.5–1.4)
EOSINOPHIL # BLD AUTO: 0.3 K/UL (ref 0–0.51)
EOSINOPHIL NFR BLD: 3.7 % (ref 0–6.9)
ERYTHROCYTE [DISTWIDTH] IN BLOOD BY AUTOMATED COUNT: 49.4 FL (ref 35.9–50)
FASTING STATUS PATIENT QL REPORTED: NORMAL
GFR SERPLBLD CREATININE-BSD FMLA CKD-EPI: 42 ML/MIN/1.73 M 2
GLOBULIN SER CALC-MCNC: 3 G/DL (ref 1.9–3.5)
GLUCOSE SERPL-MCNC: 90 MG/DL (ref 65–99)
HCT VFR BLD AUTO: 45.4 % (ref 42–52)
HGB BLD-MCNC: 15.1 G/DL (ref 14–18)
IMM GRANULOCYTES # BLD AUTO: 0.05 K/UL (ref 0–0.11)
IMM GRANULOCYTES NFR BLD AUTO: 0.6 % (ref 0–0.9)
LYMPHOCYTES # BLD AUTO: 1.5 K/UL (ref 1–4.8)
LYMPHOCYTES NFR BLD: 18.5 % (ref 22–41)
MCH RBC QN AUTO: 33.3 PG (ref 27–33)
MCHC RBC AUTO-ENTMCNC: 33.3 G/DL (ref 33.7–35.3)
MCV RBC AUTO: 100.2 FL (ref 81.4–97.8)
MONOCYTES # BLD AUTO: 0.76 K/UL (ref 0–0.85)
MONOCYTES NFR BLD AUTO: 9.3 % (ref 0–13.4)
NEUTROPHILS # BLD AUTO: 5.44 K/UL (ref 1.82–7.42)
NEUTROPHILS NFR BLD: 66.9 % (ref 44–72)
NRBC # BLD AUTO: 0 K/UL
NRBC BLD-RTO: 0 /100 WBC
POTASSIUM SERPL-SCNC: 4.7 MMOL/L (ref 3.6–5.5)
PROT SERPL-MCNC: 7.3 G/DL (ref 6–8.2)
RBC # BLD AUTO: 4.53 M/UL (ref 4.7–6.1)
SODIUM SERPL-SCNC: 140 MMOL/L (ref 135–145)
T4 FREE SERPL-MCNC: 0.76 NG/DL (ref 0.93–1.7)
TSH SERPL DL<=0.005 MIU/L-ACNC: 19 UIU/ML (ref 0.38–5.33)
WBC # BLD AUTO: 8.1 K/UL (ref 4.8–10.8)

## 2023-01-12 PROCEDURE — 85014 HEMATOCRIT: CPT

## 2023-01-12 PROCEDURE — 85018 HEMOGLOBIN: CPT

## 2023-01-12 PROCEDURE — 85041 AUTOMATED RBC COUNT: CPT

## 2023-01-12 PROCEDURE — 36415 COLL VENOUS BLD VENIPUNCTURE: CPT

## 2023-01-12 PROCEDURE — 84439 ASSAY OF FREE THYROXINE: CPT

## 2023-01-12 PROCEDURE — 80053 COMPREHEN METABOLIC PANEL: CPT

## 2023-01-12 PROCEDURE — 84443 ASSAY THYROID STIM HORMONE: CPT

## 2023-01-12 PROCEDURE — RXMED WILLOW AMBULATORY MEDICATION CHARGE: Performed by: INTERNAL MEDICINE

## 2023-01-12 PROCEDURE — 99211 OFF/OP EST MAY X REQ PHY/QHP: CPT | Performed by: INTERNAL MEDICINE

## 2023-01-12 PROCEDURE — RXMED WILLOW AMBULATORY MEDICATION CHARGE: Performed by: NURSE PRACTITIONER

## 2023-01-12 PROCEDURE — 85048 AUTOMATED LEUKOCYTE COUNT: CPT

## 2023-01-12 RX ORDER — SPIRONOLACTONE 25 MG/1
25 TABLET ORAL DAILY
Qty: 100 TABLET | Refills: 3 | Status: SHIPPED | OUTPATIENT
Start: 2023-01-12 | End: 2023-12-12 | Stop reason: SDUPTHER

## 2023-01-12 RX ORDER — METOPROLOL SUCCINATE 50 MG/1
150 TABLET, EXTENDED RELEASE ORAL DAILY
Qty: 180 TABLET | Refills: 5 | Status: SHIPPED | OUTPATIENT
Start: 2023-01-12 | End: 2023-12-12 | Stop reason: SDUPTHER

## 2023-01-12 ASSESSMENT — FIBROSIS 4 INDEX: FIB4 SCORE: 1.95

## 2023-01-12 NOTE — PROGRESS NOTES
CHF Pharmacotherapy visit - Visit    Date of Service: 1/12/23    Informed written consent was given on: 7/29/21    Rajesh De Luna is here for CHF (rate related, toxin-induced; valvular cardiomyopathy)    HPI  Pertinent Interval History since last visit:   Last seen 12/1/22  EKG completed, per notes will review with Dr Ruiz at appointment on 1/26/23  Weight is stable    Most recent EF:  55% April 2022 (increased from 40%)      Current Outpatient Medications:     Eliquis, 5 mg, Oral, BID, Taking    atorvastatin, 40 mg, Oral, DAILY, Taking    furosemide, 20 mg, Oral, QDAY PRN, Taking    amiodarone, 200 mg, Oral, DAILY, Taking    clopidogrel, 75 mg, Oral, DAILY, Taking    spironolactone, 25 mg, Oral, DAILY, Taking    metoprolol SR, 150 mg, Oral, DAILY, Taking    Current Adherence to CHF Therapies:  Complete    Current CHF Medications - including dose:   Entresto or ACE/ARB: none (held due to BP)  Beta blocker: metoprolol sr 150 mg daily  Diuretic: furosemide 20 mg daily  Aldosterone antagonist: spironolactone 25 mg daily  SGLT2i: none    Home BP and HR:  Patient has not been checking, does not have BP monitor    Change in weight: Stable    Exercise habits:  walking 1 mile a day      Diet: Common adult diet    SOCIAL HISTORY  Social History     Tobacco Use   Smoking Status Some Days    Types: Cigars   Smokeless Tobacco Never   Tobacco Comments    smoke after dinner        DATA REVIEW  Vitals:    01/12/23 0921   BP: (!) 88/61   Pulse: 70         No results found for: HBA1C       Lab Results   Component Value Date/Time    CHOLSTRLTOT 108 06/13/2021 02:35 AM    LDL 53 06/13/2021 02:35 AM    HDL 40 06/13/2021 02:35 AM    TRIGLYCERIDE 75 06/13/2021 02:35 AM       Lab Results   Component Value Date/Time    SODIUM 140 11/20/2021 12:43 AM    POTASSIUM 4.1 11/20/2021 12:43 AM    CHLORIDE 105 11/20/2021 12:43 AM    CO2 23 11/20/2021 12:43 AM    GLUCOSE 86 11/20/2021 12:43 AM    BUN 27 (H) 11/20/2021 12:43 AM    CREATININE  1.33 11/20/2021 12:43 AM     Lab Results   Component Value Date/Time    ALKPHOSPHAT 82 11/19/2021 03:36 PM    ASTSGOT 18 11/19/2021 03:36 PM    ALTSGPT 16 11/19/2021 03:36 PM    TBILIRUBIN 0.4 11/19/2021 03:36 PM    INR 1.29 (H) 08/09/2021 09:51 AM    ALBUMIN 4.4 11/19/2021 03:36 PM      No components found for: MICROALBUMINCREATRATIOURINE    Renal function:  Calculated creatinine clearance: 83 ml/min     Other Pertinent Blood Work:     Other:  Immunization History   Administered Date(s) Administered    Pneumococcal polysaccharide vaccine (PPSV-23) 09/23/2015     Up to date on pneumococcal vaccine? yes    Recent Imaging Studies:    Recent imaging studies, including ECHO, were available in EMR and reviewed with patient at today's visit      ASSESSMENT AND PLAN  Patient has not been checking BP at home, does not have monitor. Patient states he will  BP monitor and scale.   BP is borderline low in office, patient complains of some orthostatic dizziness and some SOB when walking briskly but attributes this to his age. No other complaints of hypotension.  If BP remains low may consider decrease of spironolactone to 12.5 mg at next visit  Patient noticing more frequent urination after taking medications, provided education about side effects of furosemide and need for weighing with scale to help assess excess fluid.   Patient states he does not have a phone and requested help with calling in refills for his medications from the pharmacy.     Resulting CHF medications today (changes are bolded)  Entresto or ACE/ARB: none  Beta blocker: metoprolol sr 150 mg daily  Diuretic: furosemide 20 mg daily  Aldosterone antagonist: spironolactone 25 mg daily  SGLT2i: none    Lifestyle Recommendations From Today's Visit:   Continue to limit fluid intake to 2L/day and Na+ intake to 2gm/day    Blood Work Ordered At Today's visit:   Pending labs ordered by Dr Ruiz, reminded patient obtain    Studies Ordered at Todays  Visit:  None    Follow-Up:   Dr Ruiz 2 weeks, 8 weeks pharmacotherapy    Milena Glover, PharmD    CC: Debi Ruiz

## 2023-01-12 NOTE — TELEPHONE ENCOUNTER
OUTCOME: NO ANSWER/ NO VM SET UP  IF PT CALLS BACK TRANSFER TO [facility] SCHED [phone]     I DID NOT LEAVE A VM AS IT WAS GENERAL NUMBER FOR A APARTMENT   COMPLEX    ATTEMPT 1 -KS

## 2023-01-18 PROCEDURE — RXMED WILLOW AMBULATORY MEDICATION CHARGE: Performed by: INTERNAL MEDICINE

## 2023-01-23 ENCOUNTER — PHARMACY VISIT (OUTPATIENT)
Dept: PHARMACY | Facility: MEDICAL CENTER | Age: 78
End: 2023-01-23
Payer: MEDICARE

## 2023-01-23 PROCEDURE — RXMED WILLOW AMBULATORY MEDICATION CHARGE: Performed by: INTERNAL MEDICINE

## 2023-01-26 ENCOUNTER — OFFICE VISIT (OUTPATIENT)
Dept: CARDIOLOGY | Facility: MEDICAL CENTER | Age: 78
End: 2023-01-26
Payer: MEDICARE

## 2023-01-26 VITALS
BODY MASS INDEX: 26.48 KG/M2 | WEIGHT: 185 LBS | HEART RATE: 67 BPM | OXYGEN SATURATION: 94 % | SYSTOLIC BLOOD PRESSURE: 80 MMHG | RESPIRATION RATE: 16 BRPM | HEIGHT: 70 IN | DIASTOLIC BLOOD PRESSURE: 52 MMHG

## 2023-01-26 DIAGNOSIS — N17.8 ACUTE RENAL FAILURE WITH OTHER SPECIFIED PATHOLOGICAL KIDNEY LESION SUPERIMPOSED ON STAGE 3A CHRONIC KIDNEY DISEASE (HCC): ICD-10-CM

## 2023-01-26 DIAGNOSIS — I48.0 PAROXYSMAL ATRIAL FIBRILLATION (HCC): ICD-10-CM

## 2023-01-26 DIAGNOSIS — I47.10 SVT (SUPRAVENTRICULAR TACHYCARDIA) (HCC): ICD-10-CM

## 2023-01-26 DIAGNOSIS — N18.31 ACUTE RENAL FAILURE WITH OTHER SPECIFIED PATHOLOGICAL KIDNEY LESION SUPERIMPOSED ON STAGE 3A CHRONIC KIDNEY DISEASE (HCC): ICD-10-CM

## 2023-01-26 DIAGNOSIS — I48.3 TYPICAL ATRIAL FLUTTER (HCC): ICD-10-CM

## 2023-01-26 DIAGNOSIS — I48.20 CHRONIC ATRIAL FIBRILLATION (HCC): ICD-10-CM

## 2023-01-26 DIAGNOSIS — I10 PRIMARY HYPERTENSION: ICD-10-CM

## 2023-01-26 DIAGNOSIS — F17.200 SMOKING: ICD-10-CM

## 2023-01-26 DIAGNOSIS — I35.0 NONRHEUMATIC AORTIC VALVE STENOSIS: ICD-10-CM

## 2023-01-26 DIAGNOSIS — I70.0 ATHEROSCLEROSIS OF AORTA (HCC): ICD-10-CM

## 2023-01-26 DIAGNOSIS — E78.5 HYPERLIPIDEMIA, UNSPECIFIED HYPERLIPIDEMIA TYPE: ICD-10-CM

## 2023-01-26 PROCEDURE — 99214 OFFICE O/P EST MOD 30 MIN: CPT | Performed by: INTERNAL MEDICINE

## 2023-01-26 ASSESSMENT — ENCOUNTER SYMPTOMS
CONSTITUTIONAL NEGATIVE: 1
FEVER: 0
CARDIOVASCULAR NEGATIVE: 1
CHILLS: 0
BRUISES/BLEEDS EASILY: 0
WEAKNESS: 0
RESPIRATORY NEGATIVE: 1
DIZZINESS: 0
SHORTNESS OF BREATH: 0
NEUROLOGICAL NEGATIVE: 1
COUGH: 0
EYES NEGATIVE: 1
SORE THROAT: 0
CLAUDICATION: 0
ORTHOPNEA: 0
SPUTUM PRODUCTION: 0
MUSCULOSKELETAL NEGATIVE: 1
GASTROINTESTINAL NEGATIVE: 1
PND: 0
PALPITATIONS: 0
STRIDOR: 0
LOSS OF CONSCIOUSNESS: 0
HEMOPTYSIS: 0
WHEEZING: 0

## 2023-01-26 ASSESSMENT — FIBROSIS 4 INDEX: FIB4 SCORE: 2.88

## 2023-01-26 NOTE — PROGRESS NOTES
Chief Complaint   Patient presents with    Atrial Fibrillation     F/V Dx:Paroxysmal atrial fibrillation      Supraventricular Tachycardia (SVT)    Atrial Flutter       Subjective     Rajesh De Luna is a 76 y.o. male who presents today as a follow-up for his paroxysmal atrial fibrillation aortic stenosis by blood pressure and heart failure with midrange ejection fraction. Since he was last seen he underwent a dobutamine stress echo showing aortic valve area of 0.8 with a gradient approaching severe. Note the peak velocity was 3.8 m/s.     Since he was last seen here admits he has been more short of breath with exertion.  His blood pressure is low normal but on palpitation feels normal.  He is not have any dizziness lightheadedness or chest pain.  He again is getting some exertional shortness of breath.  His last echocardiogram showed an aortic valve area of 1.0 with a low gradient.    Past Medical History:   Diagnosis Date    A-fib (HCC)     Anxiety     Breath shortness     with exertion    Cancer (HCC) 2018    Lip cancer    Dental disorder     no teeth    Depression     Heart burn     High cholesterol     History of chronic cough     Hypertension     Urinary incontinence      Past Surgical History:   Procedure Laterality Date    OTHER SURGICAL PROCEDURE  02/2018    dental extractions and cancer from lip removed, followed by radiation    CARDIAC CATH, LEFT HEART       History reviewed. No pertinent family history.  Social History     Socioeconomic History    Marital status: Single     Spouse name: Not on file    Number of children: Not on file    Years of education: Not on file    Highest education level: Not on file   Occupational History    Not on file   Tobacco Use    Smoking status: Some Days     Types: Cigars    Smokeless tobacco: Never    Tobacco comments:     smoke after dinner   Vaping Use    Vaping Use: Never used   Substance and Sexual Activity    Alcohol use: Yes     Comment: reports 2/day    Drug  use: Not Currently    Sexual activity: Not on file   Other Topics Concern    Not on file   Social History Narrative    Not on file     Social Determinants of Health     Financial Resource Strain: Not on file   Food Insecurity: Not on file   Transportation Needs: Not on file   Physical Activity: Not on file   Stress: Not on file   Social Connections: Not on file   Intimate Partner Violence: Not on file   Housing Stability: Not on file     Allergies   Allergen Reactions    Penicillins Hives            Outpatient Encounter Medications as of 1/26/2023   Medication Sig Dispense Refill    metoprolol SR (TOPROL XL) 50 MG TABLET SR 24 HR Take 3 Tablets by mouth every day. 180 Tablet 5    spironolactone (ALDACTONE) 25 MG Tab Take 1 Tablet by mouth every day. 100 Tablet 3    apixaban (ELIQUIS) 5mg Tab Take 1 tablet by mouth 2 times a day. 180 Tablet 1    atorvastatin (LIPITOR) 40 MG Tab Take 1 tablet by mouth every day. 90 Tablet 1    furosemide (LASIX) 20 MG Tab Take 1 Tablet by mouth 1 time a day as needed. Take as needed for weight gain greater than 3 pounds in 1 day or 5 pounds in 1 week. 90 Tablet 3    amiodarone (CORDARONE) 200 MG Tab Take 1 Tablet by mouth every day. 100 Tablet 2    clopidogrel (PLAVIX) 75 MG Tab Take 1 tablet by mouth every day. 90 Tablet 3     No facility-administered encounter medications on file as of 1/26/2023.     Review of Systems   Constitutional: Negative.  Negative for chills, fever and malaise/fatigue.   HENT: Negative.  Negative for sore throat.    Eyes: Negative.    Respiratory: Negative.  Negative for cough, hemoptysis, sputum production, shortness of breath, wheezing and stridor.    Cardiovascular: Negative.  Negative for chest pain, palpitations, orthopnea, claudication, leg swelling and PND.   Gastrointestinal: Negative.    Genitourinary: Negative.    Musculoskeletal: Negative.    Skin: Negative.    Neurological: Negative.  Negative for dizziness, loss of consciousness and weakness.  "  Endo/Heme/Allergies: Negative.  Does not bruise/bleed easily.   All other systems reviewed and are negative.           Objective     BP (!) 80/52 (BP Location: Left arm, Patient Position: Sitting, BP Cuff Size: Adult)   Pulse 67   Resp 16   Ht 1.778 m (5' 10\")   Wt 83.9 kg (185 lb)   SpO2 94%   BMI 26.54 kg/m²     Physical Exam  Vitals and nursing note reviewed.   Constitutional:       General: He is not in acute distress.     Appearance: He is well-developed. He is not diaphoretic.   HENT:      Head: Normocephalic and atraumatic.      Right Ear: External ear normal.      Left Ear: External ear normal.      Nose: Nose normal.      Mouth/Throat:      Pharynx: No oropharyngeal exudate.   Eyes:      General: No scleral icterus.        Right eye: No discharge.         Left eye: No discharge.      Conjunctiva/sclera: Conjunctivae normal.      Pupils: Pupils are equal, round, and reactive to light.   Neck:      Vascular: No JVD.   Cardiovascular:      Rate and Rhythm: Normal rate and regular rhythm.      Heart sounds: No murmur heard.    No friction rub. No gallop.   Pulmonary:      Effort: Pulmonary effort is normal. No respiratory distress.      Breath sounds: No stridor. No wheezing or rales.   Chest:      Chest wall: No tenderness.   Abdominal:      General: There is no distension.      Palpations: Abdomen is soft.      Tenderness: There is no guarding.   Musculoskeletal:         General: No tenderness or deformity. Normal range of motion.      Cervical back: Neck supple.   Skin:     General: Skin is warm and dry.      Coloration: Skin is not pale.      Findings: No erythema or rash.   Neurological:      Mental Status: He is alert.      Cranial Nerves: No cranial nerve deficit.      Motor: No abnormal muscle tone.      Coordination: Coordination normal.      Deep Tendon Reflexes: Reflexes are normal and symmetric. Reflexes normal.   Psychiatric:         Behavior: Behavior normal.         Thought Content: " Thought content normal.         Judgment: Judgment normal.        Echocardiogram: Dated 11/12/2021 personally viewed under myself showing interval improvement in EF to 40% with low flow low gradient aortic stenosis.    CTA: Dated 10/11/2021 personally viewed under myself showing a heavily calcified aortic valve.    Dated 6/12/2021 personally reviewed and interpreted by myself showing:    PREOPERATIVE DIAGNOSIS:  1.  Acute decompensated heart failure with reduced ejection fraction  2.  History of alcohol abuse  3.  Non-STEMI  4.  A. fib RVR     POSTOPERATIVE DIAGNOSIS:  1.  99% culprit LCx stenosis  2.  Calcific 50 to 60% proximal LAD stenosis and moderate nonobstructive RCA disease  3.  LVEDP 16 mmHg  4.  Successful PCI culprit LCx with Bromide DONOVAN    Lab Results   Component Value Date/Time    CHOLSTRLTOT 108 06/13/2021 02:35 AM    LDL 53 06/13/2021 02:35 AM    HDL 40 06/13/2021 02:35 AM    TRIGLYCERIDE 75 06/13/2021 02:35 AM       Lab Results   Component Value Date/Time    SODIUM 140 01/12/2023 10:51 AM    POTASSIUM 4.7 01/12/2023 10:51 AM    CHLORIDE 102 01/12/2023 10:51 AM    CO2 26 01/12/2023 10:51 AM    GLUCOSE 90 01/12/2023 10:51 AM    BUN 25 (H) 01/12/2023 10:51 AM    CREATININE 1.64 (H) 01/12/2023 10:51 AM     Lab Results   Component Value Date/Time    ALKPHOSPHAT 111 (H) 01/12/2023 10:51 AM    ASTSGOT 21 01/12/2023 10:51 AM    ALTSGPT 10 01/12/2023 10:51 AM    TBILIRUBIN 0.3 01/12/2023 10:51 AM          Assessment & Plan     1. Paroxysmal atrial fibrillation        2. Hyperlipidemia, unspecified hyperlipidemia type        3. Primary hypertension        4. Chronic atrial fibrillation (HCC)  REFERRAL TO CARDIOLOGY      5. SVT (supraventricular tachycardia) (HCC)  EC-ECHOCARDIOGRAM COMPLETE W/O CONT    REFERRAL TO CARDIOLOGY      6. Smoking  EC-ECHOCARDIOGRAM COMPLETE W/O CONT    REFERRAL TO CARDIOLOGY      7. Typical atrial flutter (HCC)  REFERRAL TO CARDIOLOGY      8. Nonrheumatic aortic valve stenosis   EC-ECHOCARDIOGRAM COMPLETE W/O CONT      9. Acute renal failure with other specified pathological kidney lesion superimposed on stage 3a chronic kidney disease (HCC)            Medical Decision Making: Today's Assessment/Status/Plan:        76-year-old male with low flow low gradient severe aortic stenosis.  Does not meet like it perhaps is getting symptoms of worsening aortic stenosis.  I will get an echocardiogram.  I will refer him to the valve clinic.  We can see him back in 6 months.  1. A-fib with RVR    - cont eliquis    - metop XL 50    - cont amio    2. CHFrEF vs. Valvular cardiomyopathy    - metop per above    - cont sprio 25     3. Aortic stenosis vs. pseudostenosis    - awaiting echo    4. CAD    - cont plavix    - cont atorva 40

## 2023-01-29 PROCEDURE — RXMED WILLOW AMBULATORY MEDICATION CHARGE: Performed by: NURSE PRACTITIONER

## 2023-01-30 PROCEDURE — RXMED WILLOW AMBULATORY MEDICATION CHARGE: Performed by: NURSE PRACTITIONER

## 2023-02-02 ENCOUNTER — PHARMACY VISIT (OUTPATIENT)
Dept: PHARMACY | Facility: MEDICAL CENTER | Age: 78
End: 2023-02-02
Payer: MEDICARE

## 2023-02-02 DIAGNOSIS — E78.5 HYPERLIPIDEMIA, UNSPECIFIED HYPERLIPIDEMIA TYPE: ICD-10-CM

## 2023-02-02 DIAGNOSIS — I10 PRIMARY HYPERTENSION: ICD-10-CM

## 2023-02-02 DIAGNOSIS — I50.20 HFREF (HEART FAILURE WITH REDUCED EJECTION FRACTION) (HCC): ICD-10-CM

## 2023-02-02 DIAGNOSIS — I47.10 SVT (SUPRAVENTRICULAR TACHYCARDIA) (HCC): ICD-10-CM

## 2023-02-02 DIAGNOSIS — I48.20 CHRONIC ATRIAL FIBRILLATION (HCC): ICD-10-CM

## 2023-02-02 DIAGNOSIS — F17.200 SMOKING: ICD-10-CM

## 2023-02-03 ENCOUNTER — TELEPHONE (OUTPATIENT)
Dept: CARDIOLOGY | Facility: MEDICAL CENTER | Age: 78
End: 2023-02-03
Payer: MEDICARE

## 2023-02-03 NOTE — LETTER
February 7, 2023        Rajesh De Luna  [address]  [place] [address]        Dear Rajesh:    We have been unable to reach you regarding a referral to our structural heart program for evaluation of your cardiac valvular disease, placed by Dr. Ruiz.      Please call our office at [phone] to schedule a consultation.           Sincerely,           Maryellen Vyas R.N.  Cardiac Valve Clinical Coordinator

## 2023-02-04 NOTE — TELEPHONE ENCOUNTER
Referral from: Dr. Ruiz for mod AS.     Patient called on 2/3/2023. Called [phone] which goes to [facility], and it is currently after hours. Called [phone] which states cannot accept calls. No other contacts in patient's chart.     Will try calling [facility] during business hours to see if this is how we get a hold of the patient.    First attempt

## 2023-02-06 ENCOUNTER — HOSPITAL ENCOUNTER (OUTPATIENT)
Dept: CARDIOLOGY | Facility: MEDICAL CENTER | Age: 78
End: 2023-02-06
Attending: INTERNAL MEDICINE
Payer: MEDICARE

## 2023-02-06 DIAGNOSIS — F17.200 SMOKING: ICD-10-CM

## 2023-02-06 DIAGNOSIS — I35.0 NONRHEUMATIC AORTIC VALVE STENOSIS: ICD-10-CM

## 2023-02-06 DIAGNOSIS — I47.10 SVT (SUPRAVENTRICULAR TACHYCARDIA) (HCC): ICD-10-CM

## 2023-02-06 PROCEDURE — 93306 TTE W/DOPPLER COMPLETE: CPT

## 2023-02-07 NOTE — TELEPHONE ENCOUNTER
Called [phone], and again it's for [facility]. Unable to reach anyone at the office to see if they could connect me to the patient and did not want to leave patient information in the VM. Will mail a letter to address on file.    Second attempt, letter mailed.

## 2023-02-08 LAB
LV EJECT FRACT  99904: 55
LV EJECT FRACT MOD 2C 99903: 44.33
LV EJECT FRACT MOD 4C 99902: 61.94
LV EJECT FRACT MOD BP 99901: 52.26

## 2023-02-08 PROCEDURE — 93306 TTE W/DOPPLER COMPLETE: CPT | Mod: 26 | Performed by: INTERNAL MEDICINE

## 2023-02-13 ENCOUNTER — OFFICE VISIT (OUTPATIENT)
Dept: CARDIOLOGY | Facility: MEDICAL CENTER | Age: 78
End: 2023-02-13
Payer: MEDICARE

## 2023-02-13 VITALS
RESPIRATION RATE: 18 BRPM | WEIGHT: 182 LBS | DIASTOLIC BLOOD PRESSURE: 62 MMHG | BODY MASS INDEX: 26.05 KG/M2 | OXYGEN SATURATION: 95 % | HEIGHT: 70 IN | HEART RATE: 66 BPM | SYSTOLIC BLOOD PRESSURE: 92 MMHG

## 2023-02-13 DIAGNOSIS — Z95.5 S/P DRUG ELUTING CORONARY STENT PLACEMENT: ICD-10-CM

## 2023-02-13 DIAGNOSIS — I35.0 SEVERE AORTIC STENOSIS: ICD-10-CM

## 2023-02-13 DIAGNOSIS — I48.0 PAROXYSMAL ATRIAL FIBRILLATION (HCC): ICD-10-CM

## 2023-02-13 DIAGNOSIS — I25.10 CORONARY ARTERY DISEASE INVOLVING NATIVE CORONARY ARTERY OF NATIVE HEART WITHOUT ANGINA PECTORIS: ICD-10-CM

## 2023-02-13 DIAGNOSIS — Z79.899 HIGH RISK MEDICATION USE: ICD-10-CM

## 2023-02-13 DIAGNOSIS — I50.9 HEART FAILURE, NYHA CLASS 1 (HCC): ICD-10-CM

## 2023-02-13 DIAGNOSIS — Z79.01 CHRONIC ANTICOAGULATION: ICD-10-CM

## 2023-02-13 DIAGNOSIS — I48.91 HYPERCOAGULABLE STATE DUE TO ATRIAL FIBRILLATION (HCC): ICD-10-CM

## 2023-02-13 DIAGNOSIS — I50.20 ACC/AHA STAGE C SYSTOLIC HEART FAILURE (HCC): ICD-10-CM

## 2023-02-13 DIAGNOSIS — D68.69 HYPERCOAGULABLE STATE DUE TO ATRIAL FIBRILLATION (HCC): ICD-10-CM

## 2023-02-13 PROCEDURE — 99214 OFFICE O/P EST MOD 30 MIN: CPT | Performed by: NURSE PRACTITIONER

## 2023-02-13 ASSESSMENT — ENCOUNTER SYMPTOMS
PALPITATIONS: 0
DIZZINESS: 0
ORTHOPNEA: 0
ABDOMINAL PAIN: 0
MYALGIAS: 0
FEVER: 0
SHORTNESS OF BREATH: 1
CLAUDICATION: 0
PND: 0
COUGH: 0

## 2023-02-13 ASSESSMENT — FIBROSIS 4 INDEX: FIB4 SCORE: 2.88

## 2023-02-13 NOTE — TELEPHONE ENCOUNTER
Patient in office to see Linda BINGHAM for FU. Linda BINGHAM had me go talk to patient and schedule an SHP consult. Also discussed with Linda BINGHAM and Dr. Ruiz regarding need for dobutamine stress echo as suggested in most recent echo. Per Dr. Ruiz, recommend consult at this time as patient asymptomatic/minimal symptoms.     Met with patient, thoroughly discussed his recent echo, provided him with the printed results per his request, and wrote notes explaining the various medical terminology. Wrote down progressive symptoms of AS as well. Scheduled consult with Dr. Herzog 3/6. Provided patient with my business card and direct phone number should he have any questions or concerns prior to the appointment.

## 2023-02-13 NOTE — PROGRESS NOTES
Chief Complaint   Patient presents with    Atrial Fibrillation     F/V Dx: Persistent atrial fibrillation (HCC)    Aortic Atherosclerosis    Supraventricular Tachycardia (SVT)       Subjective:   Rajesh De Luna is a 77 y.o. male who presents today for follow-up on his atrial fibrillation, heart failure, AS, CAD.    Patient of Dr. Ruiz.  Patient was last seen in clinic on 1/26/2023 with Dr. Jessee woodson.  During that visit, patient was sent for an echocardiogram and referral over to the structural heart clinic for follow up.    Patient has echocardiogram, he is here for results.    Patient mentions he feels quite well, but does have episodes where he feels short of breath with exertional activities.  He denies chest pain, palpitations, orthopnea, PND, edema or dizziness/lightheadedness.  He mentions having urinary frequency    He reports his home weights are stable around 183 pounds.    Reports taking naps throughout the day.    He smokes an occasional cigar.  And drinks a Beer on occasion.    Patient does report monitoring his sodium intake, but does eat out due to convenience.    Patient does try to stay hydrated.    He lives at a senior housing building.  He does not have a telephone.    Additonally, patient has the following medical problems:    -Rate related cardiomyopathy    -Atrial fibrillation    -Hypertension    -Alcohol use    -Respiratory failure    -lip cancer, s/p radiation    -Occ Cigar    -Dilated aortic root at 3.6 cm on 6/1/2021    Past Medical History:   Diagnosis Date    A-fib (HCC)     Anxiety     Breath shortness     with exertion    Cancer (HCC) 2018    Lip cancer    Dental disorder     no teeth    Depression     Heart burn     High cholesterol     History of chronic cough     Hypertension     Urinary incontinence      Past Surgical History:   Procedure Laterality Date    OTHER SURGICAL PROCEDURE  02/2018    dental extractions and cancer from lip removed, followed by radiation    CARDIAC CATH,  LEFT HEART       History reviewed. No pertinent family history.  Social History     Socioeconomic History    Marital status: Single     Spouse name: Not on file    Number of children: Not on file    Years of education: Not on file    Highest education level: Not on file   Occupational History    Not on file   Tobacco Use    Smoking status: Some Days     Types: Cigars    Smokeless tobacco: Never    Tobacco comments:     smoke after dinner   Vaping Use    Vaping Use: Never used   Substance and Sexual Activity    Alcohol use: Yes     Alcohol/week: 8.4 oz     Types: 14 Cans of beer per week     Comment: reports 2/day    Drug use: Not Currently    Sexual activity: Not on file   Other Topics Concern    Not on file   Social History Narrative    Not on file     Social Determinants of Health     Financial Resource Strain: Not on file   Food Insecurity: Not on file   Transportation Needs: Not on file   Physical Activity: Not on file   Stress: Not on file   Social Connections: Not on file   Intimate Partner Violence: Not on file   Housing Stability: Not on file     Allergies   Allergen Reactions    Penicillins Hives            Outpatient Encounter Medications as of 2/13/2023   Medication Sig Dispense Refill    metoprolol SR (TOPROL XL) 50 MG TABLET SR 24 HR Take 3 Tablets by mouth every day. 180 Tablet 5    spironolactone (ALDACTONE) 25 MG Tab Take 1 Tablet by mouth every day. 100 Tablet 3    apixaban (ELIQUIS) 5mg Tab Take 1 tablet by mouth 2 times a day. 180 Tablet 1    atorvastatin (LIPITOR) 40 MG Tab Take 1 tablet by mouth every day. 90 Tablet 1    furosemide (LASIX) 20 MG Tab Take 1 Tablet by mouth 1 time a day as needed. Take as needed for weight gain greater than 3 pounds in 1 day or 5 pounds in 1 week. 90 Tablet 3    amiodarone (CORDARONE) 200 MG Tab Take 1 Tablet by mouth every day. 100 Tablet 2    clopidogrel (PLAVIX) 75 MG Tab Take 1 tablet by mouth every day. 90 Tablet 3     No facility-administered encounter  "medications on file as of 2/13/2023.     Review of Systems   Constitutional:  Negative for fever and malaise/fatigue.   Respiratory:  Positive for shortness of breath. Negative for cough.    Cardiovascular:  Negative for chest pain, palpitations, orthopnea, claudication, leg swelling and PND.   Gastrointestinal:  Negative for abdominal pain.   Genitourinary:  Positive for frequency.   Musculoskeletal:  Negative for myalgias.   Neurological:  Negative for dizziness.   All other systems reviewed and are negative.     Objective:   BP 92/62 (BP Location: Left arm, Patient Position: Sitting, BP Cuff Size: Adult)   Pulse 66   Resp 18   Ht 1.778 m (5' 10\")   Wt 82.6 kg (182 lb)   SpO2 95%   BMI 26.11 kg/m²     Physical Exam  Vitals reviewed.   Constitutional:       Appearance: He is well-developed.   HENT:      Head: Normocephalic and atraumatic.   Eyes:      Pupils: Pupils are equal, round, and reactive to light.   Neck:      Vascular: No JVD.   Cardiovascular:      Rate and Rhythm: Normal rate and regular rhythm.      Heart sounds: Normal heart sounds.   Pulmonary:      Effort: Pulmonary effort is normal. No respiratory distress.      Breath sounds: Normal breath sounds. No wheezing or rales.   Abdominal:      General: Bowel sounds are normal.      Palpations: Abdomen is soft.   Musculoskeletal:      Cervical back: Normal range of motion and neck supple.   Skin:     General: Skin is warm and dry.   Neurological:      Mental Status: He is alert and oriented to person, place, and time.   Psychiatric:         Behavior: Behavior normal.         Lab Results   Component Value Date/Time    CHOLSTRLTOT 108 06/13/2021 02:35 AM    LDL 53 06/13/2021 02:35 AM    HDL 40 06/13/2021 02:35 AM    TRIGLYCERIDE 75 06/13/2021 02:35 AM       Lab Results   Component Value Date/Time    SODIUM 140 01/12/2023 10:51 AM    POTASSIUM 4.7 01/12/2023 10:51 AM    CHLORIDE 102 01/12/2023 10:51 AM    CO2 26 01/12/2023 10:51 AM    GLUCOSE 90 " 01/12/2023 10:51 AM    BUN 25 (H) 01/12/2023 10:51 AM    CREATININE 1.64 (H) 01/12/2023 10:51 AM     Lab Results   Component Value Date/Time    ALKPHOSPHAT 111 (H) 01/12/2023 10:51 AM    ASTSGOT 21 01/12/2023 10:51 AM    ALTSGPT 10 01/12/2023 10:51 AM    TBILIRUBIN 0.3 01/12/2023 10:51 AM        Transthoracic Echo Report 6/2/2021  Severely reduced left ventricular systolic function.  Global hypokinesis.  Reduced right ventricular systolic function.  Severely dilated left atrium - prominence of the posterior LA wall-   possible thrombus/mass lesion- consider further evaluation with PORTILLO or CT/MRI  Mild mitral regurgitation.     Transesophageal Echo Report 6/11/2021  1. Normal left atrial appendage. No thrombus detected in the left   atrial appendage.      2. The aortic valve is heavily calcified.  There is partial fusion of   all three leaflets.  There is severe aortic stenosis versus   pseudostenois.      3. Severely reduced left ventricular systolic function.  Left   ventricular ejection fraction is visually estimated to be 15%.     Coronary angiogram 6/12/2021  PREOPERATIVE DIAGNOSIS:  1.  Acute decompensated heart failure with reduced ejection fraction  2.  History of alcohol abuse  3.  Non-STEMI  4.  A. fib RVR     POSTOPERATIVE DIAGNOSIS:  1.  99% culprit LCx stenosis  2.  Calcific 50 to 60% proximal LAD stenosis and moderate nonobstructive RCA disease  3.  LVEDP 16 mmHg  4.  Successful PCI culprit LCx with Cecilio DONOVAN     PROCEDURE PERFORMED:  Selective coronary angiography of the native vessels  Left heart catheterization  Left ventriculogram  PCI of LCx DONOVAN  Supervision moderate sedation    Dobutamine Stress Echo Report 9/29/2021  Good contrictile reserve but stroke volume index remained very low .   Moderate to severe aortic stenosis at peak Vmx 3.8m/s, valve area 0.9cm2.    Transthoracic Echo Report 11/11/2021  Compared to the images of the prior study 09/29/21, the aortic valve gradient is unchanged.  The EF  has mildly improved.  The left ventricular ejection fraction is visually estimated to be 40%.  Possible low flow, low gradient severe aortic stenosis.  Consider dobutamine stress echo.     Transthoracic Echo Report 4/4/2022  Compared to the prior echo on 9/9/21 ejection fraction is better.   The left ventricular ejection fraction is visually estimated to be 55%.  Moderate aortic valve stenosis.  Vmax is 3.0 m/s.  The ascending aorta is dilated with a diameter of 4.2  cm.     Transthoracic Echo Report 2/6/2023  Compared to the prior study on 4/4/22, no significant changes are noted.  The left ventricular ejection fraction is visually estimated to be 55%.Calcified aortic valve leaflets. Likely low flow, low gradient   severe aortic stenosis.  Consider dobutamine echo.  Aortic valve area is 0.67 cm^2.     Assessment:     1. ACC/AHA stage C systolic heart failure (HCC)        2. Heart failure, NYHA class 1 (HCC)        3. S/P drug eluting coronary stent placement        4. Coronary artery disease involving native coronary artery of native heart without angina pectoris        5. High risk medication use        6. Paroxysmal atrial fibrillation        7. Severe aortic stenosis        8. Chronic anticoagulation        9. Hypercoagulable state due to atrial fibrillation (HCC)              Medical Decision Making:  Today's Assessment / Status / Plan:   1. HFrEF, Stage C, Class 1, LVEF 55% improved from 25%: Based on physical examination findings, patient is euvolemic. No JVD, lungs are clear to auscultation, no pitting edema in bilateral lower extremities, no ascites.  -Heart failure due to rate related cardiomyopathy vs ischemic cardiomyopathy  -ACE-I/ARB/ARNI: Not able to take due to hypotension  -Evidence Based Beta-blocker: Continue metoprolol  mg daily  -Aldosterone Antagonist: Continue spironolactone 25 mg daily  -Diuretic: Furosemide 20 mg daily as needed  -Labs: No labs due at this time  -No indication for ICD  as EF of 55%.  -Reinforced s/sx of worsening heart failure with patient and weight monitoring. Pt verbalizes understanding. Pt to call office or RTC if present.    -PUMP line number [phone] (PUMP)  -Heart Failure Education: Education reinforced-Advanced care planning: Advanced directive and POLST to be discussed at future visit    Aortic stenosis:  -Echocardiogram suggest possible low-flow, low gradient aortic stenosis.  Patient to be scheduled back with structural heart clinic to follow.  Currently he is relatively asymptomatic    Afib:  -Continue Eliquis 5 mg twice a day  -Continue amiodarone 200 mg daily  -Continue metoprolol  mg daily    CAD, s/p PCI/DONOVAN to LCx on 6/12/2021:  -Recent LDL 53 on 6/13/2021  -Continue clopidogrel 75 mg daily (with Eliquis)  -Continue atorvastatin 40 mg daily    Alcohol use:  -Encouraged continued cessation    FU in clinic in 6 months in the heart failure clinic, patient to follow-up with structural heart soon. Sooner if needed.    Patient verbalizes understanding and agrees with the plan of care.     PLEASE NOTE: This Note was created using voice recognition Software. I have made every reasonable attempt to correct obvious errors, but I expect that there are errors of grammar and possibly content that I did not discover before finalizing the note

## 2023-02-17 RX ORDER — AMIODARONE HYDROCHLORIDE 200 MG/1
200 TABLET ORAL DAILY
Qty: 100 TABLET | Refills: 2 | Status: SHIPPED | OUTPATIENT
Start: 2023-02-17 | End: 2023-05-15

## 2023-03-09 ENCOUNTER — TELEPHONE (OUTPATIENT)
Dept: VASCULAR LAB | Facility: MEDICAL CENTER | Age: 78
End: 2023-03-09
Payer: MEDICARE

## 2023-03-09 NOTE — TELEPHONE ENCOUNTER
Pt missed their CHF pharmacotherapy appointment on 3/9/23.    [facility] not [facility]. Will await patient contact to reschedule.     Milena Glover, MelissaD

## 2023-03-20 DIAGNOSIS — E78.5 HYPERLIPIDEMIA, UNSPECIFIED HYPERLIPIDEMIA TYPE: ICD-10-CM

## 2023-03-20 DIAGNOSIS — I47.10 SVT (SUPRAVENTRICULAR TACHYCARDIA) (HCC): ICD-10-CM

## 2023-03-20 DIAGNOSIS — F17.200 SMOKING: ICD-10-CM

## 2023-03-20 DIAGNOSIS — I48.20 CHRONIC ATRIAL FIBRILLATION (HCC): ICD-10-CM

## 2023-03-20 DIAGNOSIS — I50.20 HFREF (HEART FAILURE WITH REDUCED EJECTION FRACTION) (HCC): ICD-10-CM

## 2023-03-20 DIAGNOSIS — I10 PRIMARY HYPERTENSION: ICD-10-CM

## 2023-03-20 PROCEDURE — RXMED WILLOW AMBULATORY MEDICATION CHARGE: Performed by: INTERNAL MEDICINE

## 2023-03-20 RX ORDER — AMIODARONE HYDROCHLORIDE 200 MG/1
200 TABLET ORAL DAILY
Qty: 100 TABLET | Refills: 2 | Status: CANCELLED | OUTPATIENT
Start: 2023-03-20

## 2023-03-22 ENCOUNTER — PHARMACY VISIT (OUTPATIENT)
Dept: PHARMACY | Facility: MEDICAL CENTER | Age: 78
End: 2023-03-22
Payer: MEDICARE

## 2023-04-11 ENCOUNTER — TELEPHONE (OUTPATIENT)
Dept: CARDIOLOGY | Facility: MEDICAL CENTER | Age: 78
End: 2023-04-11
Payer: MEDICARE

## 2023-04-11 NOTE — LETTER
April 11, 2023        Rajesh De Luna  [address]  [place] [address]        Dear Rajesh,    We have been unable to reach you regarding rescheduling your consultation appointment with Dr. Herzog. You were previously scheduled for Monday, March 6, 2023, but did not make it to your appointment.      Please call our office at [phone] to reschedule your visit.           Thank you,           BRIDGET De Leon, RN  Cardiac Valve Clinical Coordinator

## 2023-04-11 NOTE — TELEPHONE ENCOUNTER
Reviewing surveillance. Patient was a no show to appointment with Dr. Herzog on 03/06/2023.     Unable to reach patient by phone as number is for apartment complex. Letter generated and placed in mail to patient requesting call to reschedule.     If there is no response from patient, will follow up when he is in office 05/15/2023 for follow up with Linda BINGHAM.

## 2023-04-19 ENCOUNTER — PHARMACY VISIT (OUTPATIENT)
Dept: PHARMACY | Facility: MEDICAL CENTER | Age: 78
End: 2023-04-19
Payer: MEDICARE

## 2023-04-19 DIAGNOSIS — F17.200 SMOKING: ICD-10-CM

## 2023-04-19 DIAGNOSIS — I47.10 SVT (SUPRAVENTRICULAR TACHYCARDIA) (HCC): ICD-10-CM

## 2023-04-19 DIAGNOSIS — I50.20 HFREF (HEART FAILURE WITH REDUCED EJECTION FRACTION) (HCC): ICD-10-CM

## 2023-04-19 DIAGNOSIS — I10 PRIMARY HYPERTENSION: ICD-10-CM

## 2023-04-19 DIAGNOSIS — I48.20 CHRONIC ATRIAL FIBRILLATION (HCC): ICD-10-CM

## 2023-04-19 PROCEDURE — RXMED WILLOW AMBULATORY MEDICATION CHARGE: Performed by: NURSE PRACTITIONER

## 2023-04-19 PROCEDURE — RXMED WILLOW AMBULATORY MEDICATION CHARGE: Performed by: INTERNAL MEDICINE

## 2023-05-03 ENCOUNTER — TELEPHONE (OUTPATIENT)
Dept: CARDIOLOGY | Facility: MEDICAL CENTER | Age: 78
End: 2023-05-03

## 2023-05-03 ENCOUNTER — DOCUMENTATION (OUTPATIENT)
Dept: CARDIOLOGY | Facility: MEDICAL CENTER | Age: 78
End: 2023-05-03

## 2023-05-03 ENCOUNTER — OFFICE VISIT (OUTPATIENT)
Dept: CARDIOLOGY | Facility: MEDICAL CENTER | Age: 78
End: 2023-05-03
Payer: MEDICARE

## 2023-05-03 VITALS
RESPIRATION RATE: 16 BRPM | HEIGHT: 70 IN | BODY MASS INDEX: 26.48 KG/M2 | HEART RATE: 68 BPM | DIASTOLIC BLOOD PRESSURE: 62 MMHG | WEIGHT: 185 LBS | OXYGEN SATURATION: 94 % | SYSTOLIC BLOOD PRESSURE: 120 MMHG

## 2023-05-03 DIAGNOSIS — Z01.810 PRE-PROCEDURAL CARDIOVASCULAR EXAMINATION: ICD-10-CM

## 2023-05-03 DIAGNOSIS — I25.83 CORONARY ARTERY DISEASE DUE TO LIPID RICH PLAQUE: ICD-10-CM

## 2023-05-03 DIAGNOSIS — I50.20 HFREF (HEART FAILURE WITH REDUCED EJECTION FRACTION) (HCC): ICD-10-CM

## 2023-05-03 DIAGNOSIS — I35.0 NONRHEUMATIC AORTIC VALVE STENOSIS: ICD-10-CM

## 2023-05-03 DIAGNOSIS — K02.9 DENTAL DECAY: ICD-10-CM

## 2023-05-03 DIAGNOSIS — I71.21 ANEURYSM OF ASCENDING AORTA WITHOUT RUPTURE (HCC): ICD-10-CM

## 2023-05-03 DIAGNOSIS — I25.10 CORONARY ARTERY DISEASE DUE TO LIPID RICH PLAQUE: ICD-10-CM

## 2023-05-03 DIAGNOSIS — I70.0 ATHEROSCLEROSIS OF AORTA (HCC): ICD-10-CM

## 2023-05-03 DIAGNOSIS — N18.32 STAGE 3B CHRONIC KIDNEY DISEASE: ICD-10-CM

## 2023-05-03 DIAGNOSIS — I48.19 PERSISTENT ATRIAL FIBRILLATION (HCC): ICD-10-CM

## 2023-05-03 PROCEDURE — 99215 OFFICE O/P EST HI 40 MIN: CPT | Performed by: INTERNAL MEDICINE

## 2023-05-03 ASSESSMENT — PATIENT HEALTH QUESTIONNAIRE - PHQ9
5. POOR APPETITE OR OVEREATING: 3 - NEARLY EVERY DAY
CLINICAL INTERPRETATION OF PHQ2 SCORE: 2
SUM OF ALL RESPONSES TO PHQ QUESTIONS 1-9: 12

## 2023-05-03 ASSESSMENT — FIBROSIS 4 INDEX: FIB4 SCORE: 2.88

## 2023-05-03 NOTE — TELEPHONE ENCOUNTER
Patient is scheduled on 5-19-23 for a R&L hrt w/. Patient was told to hold eliquis for 2 days prior, hold spironolactone and lasix AM day of procedure. Patient to check in at 10:00 for a 12:00 procedure. H&P was done on 5-3-23 by . Patient to call pre admit due to not having a phone.

## 2023-05-03 NOTE — PROGRESS NOTES
Patient in office for consultation appointment. Per Dr. Nunes, patient to complete angiogram and TAVR CTA prior to follow up in 4 weeks.     Met with patient. Imaging and consultation appointments scheduled. Provided patient with business card. Advised to reach out with any additional questions or concerns. Patient verbalizes understanding.

## 2023-05-03 NOTE — PROGRESS NOTES
CARDIOLOGY STRUCTURAL HEART CONSULTATION    PCP: Pcp Pt States None  REFERRING : Oniel Ruiz MD    1. Nonrheumatic aortic valve stenosis    2. HFrEF    3. Chronic atrial fibrillation (HCC)    4. Atherosclerosis of aorta (HCC)    5. Stage 3b chronic kidney disease (HCC)    6. Coronary artery disease due to lipid rich plaque    7. Dental decay        Rajesh De Luna has class II, probable stage D3 aortic stenosis.  I advised corroborating the diagnosis with a right/left heart cardiac catheterization with aortic valve study and will update a BNP before this.  I also advised dental evaluation in anticipation of needing aortic valve replacement.  Cardiac risk factors are otherwise under good control and no changes to the medication regimen were advised.  He is however worried about urinary frequency and furosemide may be able to be discontinued in the future as might clopidogrel.    Prior to her next appointment he will also complete a TAVR CTA with valve calcium scoring.    Follow up: 4 weeks    Chief Complaint   Patient presents with    Atrial Fibrillation    Hyperlipidemia    Hypertension       History: Rajesh De Luna is a 77 y.o. male with prior history of oral cancer treated with definitive radiation, persistent atrial fibrillation, stage IIIb CKD, chronic heart failure with reduced ejection fraction-LVEF fully recovered from a cande of 35% as well as coronary artery disease with prior PCI to the circumflex in 2021 presenting for assessment of aortic stenosis.    Aortic valve has been under scrutiny since 2021 when the LVEF was moderately reduced a dobutamine study demonstrated a V-max increasing to 3.8 m/s.  More recently, an aortic valve echocardiogram now showing normal LVEF shows a mean gradient of 31 mmHg, mild aortic insufficiency, aortic valve area of 0.67 with aortic valve area index less than 0.6 and stroke-volume index less than 35.    Over the past 6 months he is experiencing fatigue on  "exertion.  He finds it difficult to go to the grocery store and needs to return home probably afterwards for rest.  He does experience atypical chest discomfort and intermittent lower extremity swelling.  He denies syncope or orthopnea.    He is also troubled by urinary frequency and poor sleep at night.      ROS:   10 point review systems is otherwise negative except as per the HPI    PE:  /62 (BP Location: Left arm, Patient Position: Sitting, BP Cuff Size: Adult)   Pulse 68   Resp 16   Ht 1.778 m (5' 10\")   Wt 83.9 kg (185 lb)   SpO2 94%   BMI 26.54 kg/m²   Gen: no acute distress  HEENT: Symmetric face. Anicteric sclerae. Moist mucus membranes  NECK: No JVD. No lymphadenopathy  CARDIAC: Regular, Normal S1, S2, +systolic murmur  VASCULATURE: Diminished and delayed upstroke  RESP: Clear to auscultation bilaterally  ABD: Soft, non-tender, non-distended  EXT: Trace lower extremity edema, no clubbing or cyanosis  SKIN: Warm and dry  NEURO: No gross deficits  PSYCH: Appropriate affect, participates in conversation    Past Medical History:   Diagnosis Date    A-fib ([facility])     Anxiety     Breath shortness     with exertion    Cancer (HCC) 2018    Lip cancer    Dental disorder     no teeth    Depression     Heart burn     High cholesterol     History of chronic cough     Hypertension     Urinary incontinence      Past Surgical History:   Procedure Laterality Date    OTHER SURGICAL PROCEDURE  02/2018    dental extractions and cancer from lip removed, followed by radiation    CARDIAC CATH, LEFT HEART       Allergies   Allergen Reactions    Penicillins Hives            Outpatient Encounter Medications as of 5/3/2023   Medication Sig Dispense Refill    apixaban (ELIQUIS) 5mg Tab Take 1 tablet by mouth 2 times a day. 180 Tablet 3    amiodarone (CORDARONE) 200 MG Tab Take 1 Tablet by mouth every day. 100 Tablet 1    amiodarone (CORDARONE) 200 MG Tab Take 1 Tablet by mouth every day. 100 Tablet 2    metoprolol SR " (TOPROL XL) 50 MG TABLET SR 24 HR Take 3 Tablets by mouth every day. 180 Tablet 5    spironolactone (ALDACTONE) 25 MG Tab Take 1 Tablet by mouth every day. 100 Tablet 3    atorvastatin (LIPITOR) 40 MG Tab Take 1 tablet by mouth every day. 90 Tablet 1    furosemide (LASIX) 20 MG Tab Take 1 Tablet by mouth 1 time a day as needed. Take as needed for weight gain greater than 3 pounds in 1 day or 5 pounds in 1 week. 90 Tablet 3    clopidogrel (PLAVIX) 75 MG Tab Take 1 tablet by mouth every day. 90 Tablet 3     No facility-administered encounter medications on file as of 5/3/2023.     Social History     Socioeconomic History    Marital status: Single     Spouse name: Not on file    Number of children: Not on file    Years of education: Not on file    Highest education level: Not on file   Occupational History    Not on file   Tobacco Use    Smoking status: Some Days     Types: Cigars    Smokeless tobacco: Never    Tobacco comments:     smoke after dinner   Vaping Use    Vaping Use: Never used   Substance and Sexual Activity    Alcohol use: Yes     Alcohol/week: 8.4 oz     Types: 14 Cans of beer per week     Comment: reports 2/day    Drug use: Not Currently    Sexual activity: Not on file   Other Topics Concern    Not on file   Social History Narrative    Not on file     Social Determinants of Health     Financial Resource Strain: Not on file   Food Insecurity: Not on file   Transportation Needs: Not on file   Physical Activity: Not on file   Stress: Not on file   Social Connections: Not on file   Intimate Partner Violence: Not on file   Housing Stability: Not on file     History reviewed. No pertinent family history.      Studies  Lab Results   Component Value Date/Time    CHOLSTRLTOT 108 06/13/2021 02:35 AM    LDL 53 06/13/2021 02:35 AM    HDL 40 06/13/2021 02:35 AM    TRIGLYCERIDE 75 06/13/2021 02:35 AM       Lab Results   Component Value Date/Time    SODIUM 140 01/12/2023 10:51 AM    POTASSIUM 4.7 01/12/2023 10:51 AM     CHLORIDE 102 01/12/2023 10:51 AM    CO2 26 01/12/2023 10:51 AM    GLUCOSE 90 01/12/2023 10:51 AM    BUN 25 (H) 01/12/2023 10:51 AM    CREATININE 1.64 (H) 01/12/2023 10:51 AM     Lab Results   Component Value Date/Time    ALKPHOSPHAT 111 (H) 01/12/2023 10:51 AM    ASTSGOT 21 01/12/2023 10:51 AM    ALTSGPT 10 01/12/2023 10:51 AM    TBILIRUBIN 0.3 01/12/2023 10:51 AM                 45 minutes spent during today's encounter.

## 2023-05-04 ENCOUNTER — DOCUMENTATION (OUTPATIENT)
Dept: CARDIOLOGY | Facility: MEDICAL CENTER | Age: 78
End: 2023-05-04
Payer: MEDICARE

## 2023-05-04 NOTE — PROGRESS NOTES
Valve Program Functional Assessment: Pre-Op    [id number]   1a) Showering/bathin  1b) Walking 1 block on ground: 5  1c) Hurrying or joggin  2) Swellin  3) Fatigue: 4  4) Shortness of breath: 6  5) Sleep sitting up: 5  6) Limited enjoyment of life: 4  7) Spend the rest of your life with HF: 4  8a) Hobbies, recreational activities:3  8b) Working or doing household chores:5  8c) Visiting family or friends: 6    5 meter walk test  1) __4.93____ s/5m  2) __4.04____ s/5m  3) __5.39____ s/5m  AVG:_4.78______     Strength   1) _30_____ kg  2) _32_____ kg  3) _32_____ kg  AVG:__31.3____    TABARES ADLs  Patient independently preforms...   - Bathing: Yes   - Dressing: Yes   - Toileting: Yes   - Transferring: Yes   - Continence: Yes   - Feeding: Yes   Total Score: _6_/6    Living Situation  Patient lives: alone    Mobility Aids   Patient uses: 5none      FRAILTY SCORE: _0_/ 4

## 2023-05-15 ENCOUNTER — APPOINTMENT (OUTPATIENT)
Dept: ADMISSIONS | Facility: MEDICAL CENTER | Age: 78
End: 2023-05-15
Attending: INTERNAL MEDICINE
Payer: MEDICARE

## 2023-05-15 ENCOUNTER — OFFICE VISIT (OUTPATIENT)
Dept: CARDIOLOGY | Facility: MEDICAL CENTER | Age: 78
End: 2023-05-15
Attending: NURSE PRACTITIONER
Payer: MEDICARE

## 2023-05-15 VITALS
HEIGHT: 70 IN | WEIGHT: 182 LBS | DIASTOLIC BLOOD PRESSURE: 58 MMHG | HEART RATE: 60 BPM | RESPIRATION RATE: 16 BRPM | OXYGEN SATURATION: 96 % | SYSTOLIC BLOOD PRESSURE: 90 MMHG | BODY MASS INDEX: 26.05 KG/M2

## 2023-05-15 VITALS — HEIGHT: 70 IN | BODY MASS INDEX: 25.98 KG/M2 | WEIGHT: 181.44 LBS

## 2023-05-15 DIAGNOSIS — Z01.810 PRE-OPERATIVE CARDIOVASCULAR EXAMINATION: ICD-10-CM

## 2023-05-15 DIAGNOSIS — Z95.5 S/P DRUG ELUTING CORONARY STENT PLACEMENT: ICD-10-CM

## 2023-05-15 DIAGNOSIS — I70.0 ATHEROSCLEROSIS OF AORTA (HCC): ICD-10-CM

## 2023-05-15 DIAGNOSIS — I50.9 HEART FAILURE, NYHA CLASS 2 (HCC): ICD-10-CM

## 2023-05-15 DIAGNOSIS — D68.69 HYPERCOAGULABLE STATE DUE TO PAROXYSMAL ATRIAL FIBRILLATION (HCC): ICD-10-CM

## 2023-05-15 DIAGNOSIS — I50.20 HFREF (HEART FAILURE WITH REDUCED EJECTION FRACTION) (HCC): ICD-10-CM

## 2023-05-15 DIAGNOSIS — Z01.812 PRE-OPERATIVE LABORATORY EXAMINATION: ICD-10-CM

## 2023-05-15 DIAGNOSIS — I48.0 HYPERCOAGULABLE STATE DUE TO PAROXYSMAL ATRIAL FIBRILLATION (HCC): ICD-10-CM

## 2023-05-15 DIAGNOSIS — I25.10 CORONARY ARTERY DISEASE DUE TO LIPID RICH PLAQUE: ICD-10-CM

## 2023-05-15 DIAGNOSIS — I48.19 PERSISTENT ATRIAL FIBRILLATION (HCC): ICD-10-CM

## 2023-05-15 DIAGNOSIS — I25.83 CORONARY ARTERY DISEASE DUE TO LIPID RICH PLAQUE: ICD-10-CM

## 2023-05-15 DIAGNOSIS — I35.0 NONRHEUMATIC AORTIC VALVE STENOSIS: ICD-10-CM

## 2023-05-15 LAB
ALBUMIN SERPL BCP-MCNC: 4.5 G/DL (ref 3.2–4.9)
ALBUMIN/GLOB SERPL: 1.5 G/DL
ALP SERPL-CCNC: 123 U/L (ref 30–99)
ALT SERPL-CCNC: 11 U/L (ref 2–50)
ANION GAP SERPL CALC-SCNC: 13 MMOL/L (ref 7–16)
APTT PPP: 35.3 SEC (ref 24.7–36)
AST SERPL-CCNC: 16 U/L (ref 12–45)
BILIRUB SERPL-MCNC: 0.6 MG/DL (ref 0.1–1.5)
BUN SERPL-MCNC: 22 MG/DL (ref 8–22)
CALCIUM ALBUM COR SERPL-MCNC: 9 MG/DL (ref 8.5–10.5)
CALCIUM SERPL-MCNC: 9.4 MG/DL (ref 8.5–10.5)
CHLORIDE SERPL-SCNC: 105 MMOL/L (ref 96–112)
CO2 SERPL-SCNC: 23 MMOL/L (ref 20–33)
CREAT SERPL-MCNC: 1.32 MG/DL (ref 0.5–1.4)
EKG IMPRESSION: NORMAL
ERYTHROCYTE [DISTWIDTH] IN BLOOD BY AUTOMATED COUNT: 48.2 FL (ref 35.9–50)
GFR SERPLBLD CREATININE-BSD FMLA CKD-EPI: 55 ML/MIN/1.73 M 2
GLOBULIN SER CALC-MCNC: 3 G/DL (ref 1.9–3.5)
GLUCOSE SERPL-MCNC: 107 MG/DL (ref 65–99)
HCT VFR BLD AUTO: 45.4 % (ref 42–52)
HGB BLD-MCNC: 14.9 G/DL (ref 14–18)
INR PPP: 1.24 (ref 0.87–1.13)
MCH RBC QN AUTO: 32.5 PG (ref 27–33)
MCHC RBC AUTO-ENTMCNC: 32.8 G/DL (ref 33.7–35.3)
MCV RBC AUTO: 99.1 FL (ref 81.4–97.8)
PLATELET # BLD AUTO: 238 K/UL (ref 164–446)
PMV BLD AUTO: 9.7 FL (ref 9–12.9)
POTASSIUM SERPL-SCNC: 4.3 MMOL/L (ref 3.6–5.5)
PROT SERPL-MCNC: 7.5 G/DL (ref 6–8.2)
PROTHROMBIN TIME: 15.4 SEC (ref 12–14.6)
RBC # BLD AUTO: 4.58 M/UL (ref 4.7–6.1)
SODIUM SERPL-SCNC: 141 MMOL/L (ref 135–145)
WBC # BLD AUTO: 7.1 K/UL (ref 4.8–10.8)

## 2023-05-15 PROCEDURE — 93005 ELECTROCARDIOGRAM TRACING: CPT

## 2023-05-15 PROCEDURE — 99214 OFFICE O/P EST MOD 30 MIN: CPT | Performed by: NURSE PRACTITIONER

## 2023-05-15 PROCEDURE — 85730 THROMBOPLASTIN TIME PARTIAL: CPT

## 2023-05-15 PROCEDURE — 80053 COMPREHEN METABOLIC PANEL: CPT

## 2023-05-15 PROCEDURE — 36415 COLL VENOUS BLD VENIPUNCTURE: CPT

## 2023-05-15 PROCEDURE — 3074F SYST BP LT 130 MM HG: CPT | Performed by: NURSE PRACTITIONER

## 2023-05-15 PROCEDURE — 85610 PROTHROMBIN TIME: CPT

## 2023-05-15 PROCEDURE — 85027 COMPLETE CBC AUTOMATED: CPT

## 2023-05-15 PROCEDURE — 3078F DIAST BP <80 MM HG: CPT | Performed by: NURSE PRACTITIONER

## 2023-05-15 PROCEDURE — 99212 OFFICE O/P EST SF 10 MIN: CPT | Performed by: NURSE PRACTITIONER

## 2023-05-15 PROCEDURE — 93010 ELECTROCARDIOGRAM REPORT: CPT | Performed by: INTERNAL MEDICINE

## 2023-05-15 ASSESSMENT — ENCOUNTER SYMPTOMS
MYALGIAS: 0
PND: 0
ORTHOPNEA: 0
DIZZINESS: 1
COUGH: 0
CLAUDICATION: 0
ABDOMINAL PAIN: 0
PALPITATIONS: 0
FEVER: 0
SHORTNESS OF BREATH: 1

## 2023-05-15 ASSESSMENT — FIBROSIS 4 INDEX
FIB4 SCORE: 2.88
FIB4 SCORE: 2.88

## 2023-05-15 NOTE — PROGRESS NOTES
Chief Complaint   Patient presents with    Congestive Heart Failure     F/V Dx: ACC/AHA stage C systolic heart failure (HCC)    Atrial Fibrillation    Aortic Stenosis       Subjective:   Rajesh De Luna is a 77 y.o. male who presents today for follow-up on his atrial fibrillation, heart failure, AS, CAD.    Previous patient of Dr. Ruiz.  Patient was last seen in clinic on 5/3/2023 with Dr. Nunes.  Patient was evaluated for aortic valve disease, but needs further evaluation with right and left heart cath and aortic valve study.    His procedure scheduled on 5/19/2023.    Patient reports he has been doing okay.  He does continue to report shortness of breath and dizziness.  He also complains of urinary frequency.    He reports his home weights are stable around 185 pounds.    He smokes an occasional cigar.  And drinks a Beer on occasion.    Patient does report monitoring his sodium intake, but does eat out due to convenience.    Patient does try to stay hydrated.    He lives at a senior housing building.  He does not have a telephone.    Additionally, patient has the following medical problems:    -Rate related cardiomyopathy    -Atrial fibrillation    -Hypertension    -Alcohol use    -Respiratory failure    -lip cancer, s/p radiation    -Occ Cigar    -Dilated aortic root at 3.6 cm on 6/1/2021    Past Medical History:   Diagnosis Date    A-fib (HCC)     Anxiety     Atrial flutter (HCC)     Breath shortness     with exertion    Cancer (HCC) 2018    Lip cancer    Dental disorder     no teeth    Depression     Heart burn     High cholesterol     History of chronic cough     Hypertension     Stage 3b chronic kidney disease (CKD) (HCC)     SVT (supraventricular tachycardia) (HCC)     Urinary incontinence      Past Surgical History:   Procedure Laterality Date    OTHER SURGICAL PROCEDURE  02/2018    dental extractions and cancer from lip removed, followed by radiation    CARDIAC CATH, LEFT HEART       History reviewed. No  pertinent family history.  Social History     Socioeconomic History    Marital status: Single     Spouse name: Not on file    Number of children: Not on file    Years of education: Not on file    Highest education level: Not on file   Occupational History    Not on file   Tobacco Use    Smoking status: Some Days     Types: Cigars    Smokeless tobacco: Never    Tobacco comments:     smoke after dinner   Vaping Use    Vaping Use: Never used   Substance and Sexual Activity    Alcohol use: Yes     Alcohol/week: 8.4 oz     Types: 14 Cans of beer per week     Comment: reports 2/day    Drug use: Not Currently    Sexual activity: Not on file   Other Topics Concern    Not on file   Social History Narrative    Not on file     Social Determinants of Health     Financial Resource Strain: Not on file   Food Insecurity: Not on file   Transportation Needs: Not on file   Physical Activity: Not on file   Stress: Not on file   Social Connections: Not on file   Intimate Partner Violence: Not on file   Housing Stability: Not on file     Allergies   Allergen Reactions    Penicillins Hives            Outpatient Encounter Medications as of 5/15/2023   Medication Sig Dispense Refill    apixaban (ELIQUIS) 5mg Tab Take 1 tablet by mouth 2 times a day. 180 Tablet 3    amiodarone (CORDARONE) 200 MG Tab Take 1 Tablet by mouth every day. (Patient taking differently: Take 200 mg by mouth at bedtime.) 100 Tablet 1    metoprolol SR (TOPROL XL) 50 MG TABLET SR 24 HR Take 3 Tablets by mouth every day. (Patient taking differently: Take 150 mg by mouth every evening. Pt takes 50 mg at hs, 50 mg at 0300 and 50 mg in am.) 180 Tablet 5    spironolactone (ALDACTONE) 25 MG Tab Take 1 Tablet by mouth every day. (Patient taking differently: Take 25 mg by mouth every evening.) 100 Tablet 3    atorvastatin (LIPITOR) 40 MG Tab Take 1 tablet by mouth every day. (Patient taking differently: Take 40 mg by mouth every evening.) 90 Tablet 1    furosemide (LASIX) 20  "MG Tab Take 1 Tablet by mouth 1 time a day as needed. Take as needed for weight gain greater than 3 pounds in 1 day or 5 pounds in 1 week. 90 Tablet 3    clopidogrel (PLAVIX) 75 MG Tab Take 1 tablet by mouth every day. (Patient taking differently: Take 75 mg by mouth every evening.) 90 Tablet 3    [DISCONTINUED] amiodarone (CORDARONE) 200 MG Tab Take 1 Tablet by mouth every day. 100 Tablet 2     No facility-administered encounter medications on file as of 5/15/2023.     Review of Systems   Constitutional:  Negative for fever and malaise/fatigue.   Respiratory:  Positive for shortness of breath. Negative for cough.    Cardiovascular:  Negative for chest pain, palpitations, orthopnea, claudication, leg swelling and PND.   Gastrointestinal:  Negative for abdominal pain.   Genitourinary:  Positive for frequency.   Musculoskeletal:  Negative for myalgias.   Neurological:  Positive for dizziness.   All other systems reviewed and are negative.       Objective:   BP 90/58 (BP Location: Left arm, Patient Position: Sitting, BP Cuff Size: Adult)   Pulse 60   Resp 16   Ht 1.778 m (5' 10\")   Wt 82.6 kg (182 lb)   SpO2 96%   BMI 26.11 kg/m²     Physical Exam  Vitals reviewed.   Constitutional:       Appearance: He is well-developed.   HENT:      Head: Normocephalic and atraumatic.   Eyes:      Pupils: Pupils are equal, round, and reactive to light.   Neck:      Vascular: No JVD.   Cardiovascular:      Rate and Rhythm: Normal rate and regular rhythm.      Heart sounds: Normal heart sounds.   Pulmonary:      Effort: Pulmonary effort is normal. No respiratory distress.      Breath sounds: Normal breath sounds. No wheezing or rales.   Abdominal:      General: Bowel sounds are normal.      Palpations: Abdomen is soft.   Musculoskeletal:      Cervical back: Normal range of motion and neck supple.   Skin:     General: Skin is warm and dry.   Neurological:      Mental Status: He is alert and oriented to person, place, and time. "   Psychiatric:         Behavior: Behavior normal.           Lab Results   Component Value Date/Time    CHOLSTRLTOT 108 06/13/2021 02:35 AM    LDL 53 06/13/2021 02:35 AM    HDL 40 06/13/2021 02:35 AM    TRIGLYCERIDE 75 06/13/2021 02:35 AM       Lab Results   Component Value Date/Time    SODIUM 140 01/12/2023 10:51 AM    POTASSIUM 4.7 01/12/2023 10:51 AM    CHLORIDE 102 01/12/2023 10:51 AM    CO2 26 01/12/2023 10:51 AM    GLUCOSE 90 01/12/2023 10:51 AM    BUN 25 (H) 01/12/2023 10:51 AM    CREATININE 1.64 (H) 01/12/2023 10:51 AM     Lab Results   Component Value Date/Time    ALKPHOSPHAT 111 (H) 01/12/2023 10:51 AM    ASTSGOT 21 01/12/2023 10:51 AM    ALTSGPT 10 01/12/2023 10:51 AM    TBILIRUBIN 0.3 01/12/2023 10:51 AM        Transthoracic Echo Report 6/2/2021  Severely reduced left ventricular systolic function.  Global hypokinesis.  Reduced right ventricular systolic function.  Severely dilated left atrium - prominence of the posterior LA wall-   possible thrombus/mass lesion- consider further evaluation with PORTILLO or CT/MRI  Mild mitral regurgitation.     Transesophageal Echo Report 6/11/2021  1. Normal left atrial appendage. No thrombus detected in the left   atrial appendage.      2. The aortic valve is heavily calcified.  There is partial fusion of   all three leaflets.  There is severe aortic stenosis versus   pseudostenois.      3. Severely reduced left ventricular systolic function.  Left   ventricular ejection fraction is visually estimated to be 15%.     Coronary angiogram 6/12/2021  PREOPERATIVE DIAGNOSIS:  1.  Acute decompensated heart failure with reduced ejection fraction  2.  History of alcohol abuse  3.  Non-STEMI  4.  A. fib RVR     POSTOPERATIVE DIAGNOSIS:  1.  99% culprit LCx stenosis  2.  Calcific 50 to 60% proximal LAD stenosis and moderate nonobstructive RCA disease  3.  LVEDP 16 mmHg  4.  Successful PCI culprit LCx with [place] DONOVAN     PROCEDURE PERFORMED:  Selective coronary angiography of the native  vessels  Left heart catheterization  Left ventriculogram  PCI of LCx DONOVAN  Supervision moderate sedation    Dobutamine Stress Echo Report 9/29/2021  Good contrictile reserve but stroke volume index remained very low .   Moderate to severe aortic stenosis at peak Vmx 3.8m/s, valve area 0.9cm2.    Transthoracic Echo Report 11/11/2021  Compared to the images of the prior study 09/29/21, the aortic valve gradient is unchanged.  The EF has mildly improved.  The left ventricular ejection fraction is visually estimated to be 40%.  Possible low flow, low gradient severe aortic stenosis.  Consider dobutamine stress echo.     Transthoracic Echo Report 4/4/2022  Compared to the prior echo on 9/9/21 ejection fraction is better.   The left ventricular ejection fraction is visually estimated to be 55%.  Moderate aortic valve stenosis.  Vmax is 3.0 m/s.  The ascending aorta is dilated with a diameter of 4.2  cm.     Transthoracic Echo Report 2/6/2023  Compared to the prior study on 4/4/22, no significant changes are noted.  The left ventricular ejection fraction is visually estimated to be 55%.Calcified aortic valve leaflets. Likely low flow, low gradient severe aortic stenosis.  Consider dobutamine echo.  Aortic valve area is 0.67 cm^2.     Assessment:     1. HFrEF (heart failure with reduced ejection fraction) ([facility])        2. Heart failure, NYHA class 2 ([facility])        3. Nonrheumatic aortic valve stenosis        4. Persistent atrial fibrillation ([facility])        5. Atherosclerosis of aorta ([facility])        6. Coronary artery disease due to lipid rich plaque        7. S/P drug eluting coronary stent placement        8. Hypercoagulable state due to paroxysmal atrial fibrillation ([facility])              Medical Decision Making:  Today's Assessment / Status / Plan:   HFrEF, Stage C, Class 1-2, LVEF 55% improved from 25%: Based on physical examination findings, patient is euvolemic. No JVD, lungs are clear to auscultation, no pitting edema in  bilateral lower extremities, no ascites.  -Heart failure due to rate related cardiomyopathy vs ischemic cardiomyopathy  -ACE-I/ARB/ARNI: Not able to take due to hypotension  -Evidence Based Beta-blocker: Continue metoprolol  mg daily  -Aldosterone Antagonist: Continue spironolactone 25 mg daily  -Diuretic: Furosemide 20 mg daily as needed  -Labs: No labs due at this time  -No indication for ICD as EF of 55%.  -Reinforced s/sx of worsening heart failure with patient and weight monitoring. Pt verbalizes understanding. Pt to call office or RTC if present.    -PUMP line number [phone] (PUMP)  -Heart Failure Education: Education reinforced-Advanced care planning: Advanced directive and POLST to be discussed at future visit    Aortic stenosis:  -Followed by structural heart clinic    Afib:  -Continue Eliquis 5 mg twice a day  -Continue amiodarone 200 mg daily  -Continue metoprolol  mg daily    CAD, s/p PCI/DONOVAN to LCx on 6/12/2021:  -Recent LDL 53 on 6/13/2021  -Continue clopidogrel 75 mg daily (with Eliquis)  -Continue atorvastatin 40 mg daily    Alcohol use:  -Encouraged continued cessation    FU in clinic in 3 months back in the heart failure clinic, patient has a follow-up with structural heart next month.  Sooner if needed.    Patient verbalizes understanding and agrees with the plan of care.     PLEASE NOTE: This Note was created using voice recognition Software. I have made every reasonable attempt to correct obvious errors, but I expect that there are errors of grammar and possibly content that I did not discover before finalizing the note

## 2023-05-19 ENCOUNTER — HOSPITAL ENCOUNTER (OUTPATIENT)
Facility: MEDICAL CENTER | Age: 78
End: 2023-05-19
Attending: INTERNAL MEDICINE | Admitting: INTERNAL MEDICINE
Payer: MEDICARE

## 2023-05-19 ENCOUNTER — APPOINTMENT (OUTPATIENT)
Dept: CARDIOLOGY | Facility: MEDICAL CENTER | Age: 78
End: 2023-05-19
Attending: INTERNAL MEDICINE
Payer: MEDICARE

## 2023-05-19 VITALS
SYSTOLIC BLOOD PRESSURE: 116 MMHG | DIASTOLIC BLOOD PRESSURE: 67 MMHG | BODY MASS INDEX: 25.63 KG/M2 | TEMPERATURE: 97.2 F | WEIGHT: 179.01 LBS | RESPIRATION RATE: 16 BRPM | HEIGHT: 70 IN | HEART RATE: 71 BPM | OXYGEN SATURATION: 96 %

## 2023-05-19 DIAGNOSIS — I35.0 NONRHEUMATIC AORTIC VALVE STENOSIS: ICD-10-CM

## 2023-05-19 LAB
BASE EXCESS BLDA CALC-SCNC: -4 MMOL/L (ref -4–3)
BASE EXCESS BLDV CALC-SCNC: -3 MMOL/L (ref -4–3)
BODY TEMPERATURE: ABNORMAL DEGREES
BODY TEMPERATURE: ABNORMAL DEGREES
CA-I BLD ISE-SCNC: 1.18 MMOL/L (ref 1.1–1.3)
CA-I BLD ISE-SCNC: 1.18 MMOL/L (ref 1.1–1.3)
CO2 BLDA-SCNC: 21 MMOL/L (ref 20–33)
CO2 BLDV-SCNC: 22 MMOL/L (ref 20–33)
HCO3 BLDA-SCNC: 20.4 MMOL/L (ref 17–25)
HCO3 BLDV-SCNC: 21.1 MMOL/L (ref 24–28)
PCO2 BLDA: 33.9 MMHG (ref 26–37)
PCO2 BLDV: 35.4 MMHG (ref 41–51)
PH BLDA: 7.39 [PH] (ref 7.4–7.5)
PH BLDV: 7.38 [PH] (ref 7.31–7.45)
PO2 BLDA: 101 MMHG (ref 64–87)
PO2 BLDV: 36 MMHG (ref 25–40)
POTASSIUM BLD-SCNC: 3.3 MMOL/L (ref 3.6–5.5)
POTASSIUM BLD-SCNC: 3.4 MMOL/L (ref 3.6–5.5)
SAO2 % BLDA: 98 % (ref 93–99)
SAO2 % BLDV: 68 %
SODIUM BLD-SCNC: 141 MMOL/L (ref 135–145)
SODIUM BLD-SCNC: 141 MMOL/L (ref 135–145)
SPECIMEN DRAWN FROM PATIENT: ABNORMAL
SPECIMEN DRAWN FROM PATIENT: ABNORMAL

## 2023-05-19 PROCEDURE — 82803 BLOOD GASES ANY COMBINATION: CPT

## 2023-05-19 PROCEDURE — 84295 ASSAY OF SERUM SODIUM: CPT

## 2023-05-19 PROCEDURE — 82330 ASSAY OF CALCIUM: CPT

## 2023-05-19 PROCEDURE — 700105 HCHG RX REV CODE 258: Mod: UD | Performed by: INTERNAL MEDICINE

## 2023-05-19 PROCEDURE — 160036 HCHG PACU - EA ADDL 30 MINS PHASE I

## 2023-05-19 PROCEDURE — 160035 HCHG PACU - 1ST 60 MINS PHASE I

## 2023-05-19 PROCEDURE — 99152 MOD SED SAME PHYS/QHP 5/>YRS: CPT | Performed by: INTERNAL MEDICINE

## 2023-05-19 PROCEDURE — 84132 ASSAY OF SERUM POTASSIUM: CPT

## 2023-05-19 PROCEDURE — 160002 HCHG RECOVERY MINUTES (STAT)

## 2023-05-19 PROCEDURE — A9270 NON-COVERED ITEM OR SERVICE: HCPCS | Mod: UD

## 2023-05-19 PROCEDURE — 160046 HCHG PACU - 1ST 60 MINS PHASE II

## 2023-05-19 PROCEDURE — 99153 MOD SED SAME PHYS/QHP EA: CPT

## 2023-05-19 PROCEDURE — 700102 HCHG RX REV CODE 250 W/ 637 OVERRIDE(OP): Mod: UD

## 2023-05-19 PROCEDURE — 700101 HCHG RX REV CODE 250: Mod: UD

## 2023-05-19 PROCEDURE — 700111 HCHG RX REV CODE 636 W/ 250 OVERRIDE (IP)

## 2023-05-19 PROCEDURE — 700117 HCHG RX CONTRAST REV CODE 255: Performed by: INTERNAL MEDICINE

## 2023-05-19 PROCEDURE — 93460 R&L HRT ART/VENTRICLE ANGIO: CPT | Mod: 26 | Performed by: INTERNAL MEDICINE

## 2023-05-19 RX ORDER — ASPIRIN 81 MG/1
TABLET, CHEWABLE ORAL
Status: COMPLETED
Start: 2023-05-19 | End: 2023-05-19

## 2023-05-19 RX ORDER — HEPARIN SODIUM 200 [USP'U]/100ML
INJECTION, SOLUTION INTRAVENOUS
Status: COMPLETED
Start: 2023-05-19 | End: 2023-05-19

## 2023-05-19 RX ORDER — MIDAZOLAM HYDROCHLORIDE 1 MG/ML
INJECTION INTRAMUSCULAR; INTRAVENOUS
Status: COMPLETED
Start: 2023-05-19 | End: 2023-05-19

## 2023-05-19 RX ORDER — VERAPAMIL HYDROCHLORIDE 2.5 MG/ML
INJECTION, SOLUTION INTRAVENOUS
Status: COMPLETED
Start: 2023-05-19 | End: 2023-05-19

## 2023-05-19 RX ORDER — HEPARIN SODIUM 1000 [USP'U]/ML
INJECTION, SOLUTION INTRAVENOUS; SUBCUTANEOUS
Status: COMPLETED
Start: 2023-05-19 | End: 2023-05-19

## 2023-05-19 RX ORDER — LIDOCAINE HYDROCHLORIDE 20 MG/ML
INJECTION, SOLUTION INFILTRATION; PERINEURAL
Status: COMPLETED
Start: 2023-05-19 | End: 2023-05-19

## 2023-05-19 RX ORDER — SODIUM CHLORIDE 9 MG/ML
1000 INJECTION, SOLUTION INTRAVENOUS CONTINUOUS
Status: DISCONTINUED | OUTPATIENT
Start: 2023-05-19 | End: 2023-05-19 | Stop reason: HOSPADM

## 2023-05-19 RX ORDER — SODIUM CHLORIDE 9 MG/ML
INJECTION, SOLUTION INTRAVENOUS CONTINUOUS
Status: DISCONTINUED | OUTPATIENT
Start: 2023-05-19 | End: 2023-05-19 | Stop reason: HOSPADM

## 2023-05-19 RX ADMIN — NITROGLYCERIN 10 ML: 20 INJECTION INTRAVENOUS at 11:47

## 2023-05-19 RX ADMIN — FENTANYL CITRATE 75 MCG: 50 INJECTION, SOLUTION INTRAMUSCULAR; INTRAVENOUS at 12:15

## 2023-05-19 RX ADMIN — HEPARIN SODIUM: 1000 INJECTION, SOLUTION INTRAVENOUS; SUBCUTANEOUS at 11:46

## 2023-05-19 RX ADMIN — IOHEXOL 44 ML: 350 INJECTION, SOLUTION INTRAVENOUS at 12:15

## 2023-05-19 RX ADMIN — SODIUM CHLORIDE 1000 ML: 9 INJECTION, SOLUTION INTRAVENOUS at 10:45

## 2023-05-19 RX ADMIN — HEPARIN SODIUM 2000 UNITS: 200 INJECTION, SOLUTION INTRAVENOUS at 11:47

## 2023-05-19 RX ADMIN — MIDAZOLAM HYDROCHLORIDE 1 MG: 1 INJECTION, SOLUTION INTRAMUSCULAR; INTRAVENOUS at 12:15

## 2023-05-19 RX ADMIN — LIDOCAINE HYDROCHLORIDE: 20 INJECTION, SOLUTION INFILTRATION; PERINEURAL at 11:47

## 2023-05-19 RX ADMIN — VERAPAMIL HYDROCHLORIDE 2.5 MG: 2.5 INJECTION, SOLUTION INTRAVENOUS at 11:47

## 2023-05-19 RX ADMIN — ASPIRIN 81 MG 324 MG: 81 TABLET ORAL at 11:45

## 2023-05-19 ASSESSMENT — FIBROSIS 4 INDEX: FIB4 SCORE: 1.6

## 2023-05-19 ASSESSMENT — PAIN DESCRIPTION - PAIN TYPE
TYPE: ACUTE PAIN
TYPE: SURGICAL PAIN
TYPE: SURGICAL PAIN
TYPE: ACUTE PAIN
TYPE: SURGICAL PAIN

## 2023-05-19 NOTE — OR NURSING
1239 Pt arrived from cath lab in a [facility]. ID band verified. Report received from cath lab nurse. Pt is wearing glasses, no other belongings with pt. Right radial TR band and right brachial site assessed. Both sites clean, dry and intact. Pt is on monitor.     1257 Tried calling neighbor with an update. No answer.     1340 Update given to pt's neighbor.     1450 TR band removed. Site is clean and dry. Gauze and tegaderm place on site.     1452 Report given to phase 2 nurse.

## 2023-05-19 NOTE — PROGRESS NOTES
Pharmacy Medication Reconciliation      ~Medication reconciliation updated and complete per patient at bedside   ~Allergies have been verified and updated   ~No oral ABX within the last 30 days  ~Patient home pharmacy :  [facility] [facility]

## 2023-05-19 NOTE — DISCHARGE INSTRUCTIONS
HOME CARE INSTRUCTIONS    ACTIVITY: Rest and take it easy for the first 24 hours.  A responsible adult is recommended to remain with you during that time.  It is normal to feel sleepy.  We encourage you to not do anything that requires balance, judgment or coordination.    FOR 24 HOURS DO NOT:  Drive, operate machinery or run household appliances.  Drink beer or alcoholic beverages.  Make important decisions or sign legal documents.    SPECIAL INSTRUCTIONS: Limit wrist movement for 2 days .     DIET: To avoid nausea, slowly advance diet as tolerated, avoiding spicy or greasy foods for the first day.  Add more substantial food to your diet according to your physician's instructions.  Babies can be fed formula or breast milk as soon as they are hungry.  INCREASE FLUIDS AND FIBER TO AVOID CONSTIPATION.    SURGICAL DRESSING/BATHING: remove dressing /occlusive 24 hours after procedure.    MEDICATIONS: Resume taking daily medication.  Take prescribed pain medication with food.  If no medication is prescribed, you may take non-aspirin pain medication if needed.  PAIN MEDICATION CAN BE VERY CONSTIPATING.  Take a stool softener or laxative such as senokot, pericolace, or milk of magnesia if needed.      A follow-up appointment should be arranged with your doctor  Alcides Dominguez in [phone]; call to schedule.    You should CALL YOUR PHYSICIAN if you develop:  Fever greater than 101 degrees F.  Pain not relieved by medication, or persistent nausea or vomiting.  Excessive bleeding (blood soaking through dressing) or unexpected drainage from the wound.  Extreme redness or swelling around the incision site, drainage of pus or foul smelling drainage.  Inability to urinate or empty your bladder within 8 hours.  Problems with breathing or chest pain.    You should call 911 if you develop problems with breathing or chest pain.  If you are unable to contact your doctor or surgical center, you should go to the nearest emergency  room or urgent care center.   Dr. Alcides Dominguez -Physician's telephone #: [phone]    MILD FLU-LIKE SYMPTOMS ARE NORMAL.  YOU MAY EXPERIENCE GENERALIZED MUSCLE ACHES, THROAT IRRITATION, HEADACHE AND/OR SOME NAUSEA.    If any questions arise, call your doctor.  If your doctor is not available, please feel free to call the Surgical Center at [phone].  The Center is open Monday through Friday from 7AM to 7PM.      A registered nurse may call you a few days after your surgery to see how you are doing after your procedure.    You may also receive a survey in the mail within the next two weeks and we ask that you take a few moments to complete the survey and return it to us.  Our goal is to provide you with very good care and we value your comments.     Depression / Suicide Risk    As you are discharged from this [facility] facility, it is important to learn how to keep safe from harming yourself.    Recognize the warning signs:  Abrupt changes in personality, positive or negative- including increase in energy   Giving away possessions  Change in eating patterns- significant weight changes-  positive or negative  Change in sleeping patterns- unable to sleep or sleeping all the time   Unwillingness or inability to communicate  Depression  Unusual sadness, discouragement and loneliness  Talk of wanting to die  Neglect of personal appearance   Rebelliousness- reckless behavior  Withdrawal from people/activities they love  Confusion- inability to concentrate     If you or a loved one observes any of these behaviors or has concerns about self-harm, here's what you can do:  Talk about it- your feelings and reasons for harming yourself  Remove any means that you might use to hurt yourself (examples: pills, rope, extension cords, firearm)  Get professional help from the community (Mental Health, Substance Abuse, psychological counseling)  Do not be alone:Call your Safe Contact- someone whom you trust who will be  there for you.  Call your local CRISIS HOTLINE 838-9931 or 035-778-2356  Call your local Children's Mobile Crisis Response Team [place] (981) 359-6716 or www.BCNX  Call the toll free National Suicide Prevention Hotlines   National Suicide Prevention Lifeline 104-324-UMYY (2444)  [facility] Line Network 800-SUICIDE (642-8901)    I acknowledge receipt and understanding of these Home Care instructions.

## 2023-05-19 NOTE — PROCEDURES
"CARDIAC CATHETERIZATION REPORT    REFERRING: Clifford Nunes M.D.    PROCEDURE PHYSICIAN: Clifford Nunes MD, [facility], [facility]  ASSISTANT: None    IMPRESSIONS:  1. Severe aortic stenosis (D1)  2. Moderate LAD/RCA CAD with patent circumflex stent  3. Normal right heart hemodynamics, normal CO    Recommendations:  AVR assessment    Pre-procedure diagnosis: Severe aortic stenosis  Post-procedure diagnosis: Same    Procedure performed  Selective coronary angiography  Left heart catheterization  Right heart catheterization    Conscious sedation was supervised by myself and administered by trained personnel using fentanyl and versed between 1145 and 1215. The patient tolerated sedation without complication.     Procedure Description  1. Access: 5/6 Faroese right radial artery and brachial vein Micropuncture technique was utilized following local anesthesia with lidocaine.  A radial cocktail containing verapamil and saline was administered in the radial artery sheath Brachial venous access was obtained using micropuncture technique and dynamic ultrasound guidance    2. Diagnostic description: The catheter was passed to the central circulation with the aide of J tipped 0.35\" wire. A 6F JR4, 6F JL3.5, and 6F dual lumen pigtail  were used to inject the coronary circulation and enter the left ventricle during invasive hemodynamic monitoring  perform simultaneous LV and aortic pressure tracings  and a balloon tipped catheter was passed intravenously through the right heart chambers into wedge position, obtaining hemodynamic information. CO measured by thermodilution and assumed Frida    3. Closing: At completion of the procedure the relevant equipment was removed from the body and hemostasis achieved by Radial band and Manual Compression    Findings   Hemodynamics:   Aorta: 115/66 mmHg  LVEDP: 23 mmHg  RA: 7 mmHg  RV: 27/10 mmHg  PA: 25/10/16 mmHg  PCWP: 10 mmHg  Cardiac Output/Index (thermodilution): 5.4 L/min, 2.7 L/min/m2  Cardiac " Output/Index (Frida): 3.9 L/min, 1.9 L/min/m2  ABRAHAM: 0.8 mm2  Aortic valve mean gradient: 41 mmHg    Coronary Anatomy   Left Main:  10% distal stenosis   LAD:  40% proximal and 60% mid stenosis- unchanged from 2021   LCx:  30% proximal stenosis. Patent mid stent. OM1 is small and normal. OM2 is moderate and has <30% stenosis.    RCA: Dominant, 60% proximal stenosis. The PDA has 50% mid stenosis. The PLB has MLI     Technical Factors  1. Complications: None  2. Estimated Blood Loss: <50 cc  3. Specimens: None  4. Contrast Volume: 44 ml  5. Medications: Radial cocktail (Verapamil 2.5 mg, Nitroglycerin 100 mcg) Heparin 5000 Units  6. Radiation (air kerma): 196 mGy

## 2023-05-19 NOTE — OR NURSING
1510H - Assumed care recovery 2 .PATIENTALIAS is alert and oriented x 4 walks with a steady gait.   Right radial and brachial dressing with telfa and tegaderm is clean , dry and intact.  Arms sling applied.v/s taken and recorded   1535H Discharge instructions given to patient's friend Lacho with patients consent .

## 2023-05-25 ENCOUNTER — HOSPITAL ENCOUNTER (OUTPATIENT)
Dept: RADIOLOGY | Facility: MEDICAL CENTER | Age: 78
End: 2023-05-25
Attending: INTERNAL MEDICINE
Payer: MEDICARE

## 2023-05-25 ENCOUNTER — PHARMACY VISIT (OUTPATIENT)
Dept: PHARMACY | Facility: MEDICAL CENTER | Age: 78
End: 2023-05-25
Payer: MEDICARE

## 2023-05-25 DIAGNOSIS — Z01.810 PRE-PROCEDURAL CARDIOVASCULAR EXAMINATION: ICD-10-CM

## 2023-05-25 DIAGNOSIS — I35.0 NONRHEUMATIC AORTIC VALVE STENOSIS: ICD-10-CM

## 2023-05-25 PROCEDURE — 71275 CT ANGIOGRAPHY CHEST: CPT

## 2023-05-25 PROCEDURE — 700117 HCHG RX CONTRAST REV CODE 255: Mod: UD | Performed by: INTERNAL MEDICINE

## 2023-05-25 PROCEDURE — RXMED WILLOW AMBULATORY MEDICATION CHARGE: Performed by: NURSE PRACTITIONER

## 2023-05-25 RX ADMIN — IOHEXOL 100 ML: 350 INJECTION, SOLUTION INTRAVENOUS at 11:31

## 2023-05-29 NOTE — PROGRESS NOTES
REFERRING PHYSICIAN: Clifford Nunes MD.     CONSULTING PHYSICIAN: Ludivina Curtis MD     CHIEF COMPLAINT: Shortness of breath    HISTORY OF PRESENT ILLNESS: The patient is a 77 y.o. male with a past medical history of HLD, HTN, HFrEF, lip CA with radiation, tobacco use, aortic stenosis, atherosclerosis of the aorta, CAD s/p stent in 2021 to the circumflex, CKD stage III who presents to the office with worsening shortness of breath over the last six months.  He has associated fatigue and episodes of dizziness.  He denies chest pain and lower extremity edema.  He was seen by his cardiologist and underwent an echocardiogram which shows low flow low gradient severe aortic stenosis    PAST MEDICAL HISTORY:   Active Ambulatory Problems     Diagnosis Date Noted    Hyperlipidemia 09/18/2020    Hypertension 09/18/2020    SVT (supraventricular tachycardia) ([facility]) 06/10/2021    HFrEF 06/10/2021    Smoking 06/10/2021    Diverticulosis 10/11/2021    Hiatal hernia 10/11/2021    Renal scarring 10/11/2021    Splenic calcification 10/11/2021    Atrial flutter (HCC) 11/19/2021    Nonrheumatic aortic valve stenosis 11/19/2021    Atherosclerosis of aorta (Former[facility]) 01/26/2023    Hypercoagulable state due to atrial fibrillation (Former[facility]) 02/13/2023    Coronary artery disease due to lipid rich plaque 05/03/2023    Dental decay 05/03/2023    Aneurysm of ascending aorta without rupture (Former[facility]) 05/03/2023    Stage 3b chronic kidney disease (HCC) 05/03/2023    LVH (left ventricular hypertrophy) 06/07/2023     Resolved Ambulatory Problems     Diagnosis Date Noted    Paroxysmal atrial fibrillation 09/18/2020    Acute respiratory failure with hypoxia ([facility]) 06/01/2021    Elevated troponin 06/01/2021    Shortness of breath 06/01/2021    Alcohol withdrawal delirium (HCC) 06/03/2021    Chronic atrial fibrillation (HCC) 06/10/2021    Left atrial mass 06/10/2021    LA thrombus 06/10/2021    Discharge planning issues 11/20/2021     Past Medical History:    Diagnosis Date    A-fib (HCC)     Anxiety     Breath shortness     Cancer (HCC) 2018    Dental disorder     Depression     Heart burn     High cholesterol     History of chronic cough     Stage 3b chronic kidney disease (CKD) (HCC)     Urinary incontinence        PAST SURGICAL HISTORY:   Past Surgical History:   Procedure Laterality Date    OTHER SURGICAL PROCEDURE  02/2018    dental extractions and cancer from lip removed, followed by radiation    CARDIAC CATH, LEFT HEART          ALLERGIES:   Allergies   Allergen Reactions    Penicillins Hives               CURRENT MEDICATIONS:   Current Outpatient Medications:     apixaban (ELIQUIS) 5mg Tab, Take 1 tablet by mouth 2 times a day., Disp: 180 Tablet, Rfl: 3    amiodarone (CORDARONE) 200 MG Tab, Take 1 Tablet by mouth every day., Disp: 100 Tablet, Rfl: 1    metoprolol SR (TOPROL XL) 50 MG TABLET SR 24 HR, Take 3 Tablets by mouth every day., Disp: 180 Tablet, Rfl: 5    spironolactone (ALDACTONE) 25 MG Tab, Take 1 Tablet by mouth every day., Disp: 100 Tablet, Rfl: 3    atorvastatin (LIPITOR) 40 MG Tab, Take 1 tablet by mouth every day., Disp: 90 Tablet, Rfl: 1    furosemide (LASIX) 20 MG Tab, Take 1 Tablet by mouth 1 time a day as needed. Take as needed for weight gain greater than 3 pounds in 1 day or 5 pounds in 1 week., Disp: 90 Tablet, Rfl: 3    FAMILY HISTORY: History reviewed. No pertinent family history.     SOCIAL HISTORY:   Social History     Socioeconomic History    Marital status: Single     Spouse name: Not on file    Number of children: Not on file    Years of education: Not on file    Highest education level: Not on file   Occupational History    Not on file   Tobacco Use    Smoking status: Some Days     Types: Cigars    Smokeless tobacco: Never    Tobacco comments:     smoke after dinner   Vaping Use    Vaping Use: Never used   Substance and Sexual Activity    Alcohol use: Yes     Alcohol/week: 8.4 oz     Types: 14 Cans of beer per week     Comment:  "reports 2/day    Drug use: Not Currently    Sexual activity: Not on file   Other Topics Concern    Not on file   Social History Narrative    Not on file     Social Determinants of Health     Financial Resource Strain: Not on file   Food Insecurity: Not on file   Transportation Needs: Not on file   Physical Activity: Not on file   Stress: Not on file   Social Connections: Not on file   Intimate Partner Violence: Not on file   Housing Stability: Not on file       REVIEW OF SYSTEMS:  Review of Systems   Constitutional:  Positive for malaise/fatigue. Negative for fever.   HENT: Negative.     Respiratory:  Positive for shortness of breath (with exertion).    Cardiovascular:  Positive for orthopnea.   Gastrointestinal: Negative.    Genitourinary: Negative.    Musculoskeletal: Negative.    Skin: Negative.    Neurological:  Positive for dizziness (occasional with standing).   Endo/Heme/Allergies: Negative.    Psychiatric/Behavioral: Negative.             PHYSICAL EXAMINATION:    /62 (BP Location: Left arm, Patient Position: Sitting, BP Cuff Size: Adult)   Pulse 75   Temp 36.8 °C (98.3 °F) (Temporal)   Ht 1.778 m (5' 10\")   Wt 82.1 kg (181 lb)   SpO2 96%   BMI 25.97 kg/m²    Physical Exam  Vitals reviewed.   Constitutional:       General: He is not in acute distress.     Appearance: Normal appearance.   HENT:      Head: Normocephalic and atraumatic.      Right Ear: External ear normal.      Left Ear: External ear normal.      Nose: Nose normal. No congestion.   Eyes:      General: No scleral icterus.     Extraocular Movements: Extraocular movements intact.      Conjunctiva/sclera: Conjunctivae normal.   Cardiovascular:      Rate and Rhythm: Normal rate and regular rhythm.   Pulmonary:      Effort: Pulmonary effort is normal. No respiratory distress.   Abdominal:      General: There is no distension.      Palpations: Abdomen is soft.   Musculoskeletal:         General: Normal range of motion.      Cervical back: " Normal range of motion.   Skin:     General: Skin is warm and dry.      Coloration: Skin is not jaundiced.      Findings: No rash.   Neurological:      Mental Status: He is alert and oriented to person, place, and time.      Cranial Nerves: No cranial nerve deficit.   Psychiatric:         Mood and Affect: Mood normal.         Behavior: Behavior normal.             LABS REVIEWED:  Lab Results   Component Value Date/Time    SODIUM 141 05/15/2023 03:09 PM    POTASSIUM 4.3 05/15/2023 03:09 PM    CHLORIDE 105 05/15/2023 03:09 PM    CO2 23 05/15/2023 03:09 PM    GLUCOSE 107 (H) 05/15/2023 03:09 PM    BUN 22 05/15/2023 03:09 PM    CREATININE 1.32 05/15/2023 03:09 PM      Lab Results   Component Value Date/Time    PROTHROMBTM 15.4 (H) 05/15/2023 03:09 PM    INR 1.24 (H) 05/15/2023 03:09 PM      Lab Results   Component Value Date/Time    WBC 7.1 05/15/2023 03:09 PM    RBC 4.58 (L) 05/15/2023 03:09 PM    HEMOGLOBIN 14.9 05/15/2023 03:09 PM    HEMATOCRIT 45.4 05/15/2023 03:09 PM    MCV 99.1 (H) 05/15/2023 03:09 PM    MCH 32.5 05/15/2023 03:09 PM    MCHC 32.8 (L) 05/15/2023 03:09 PM    MPV 9.7 05/15/2023 03:09 PM    NEUTSPOLYS 66.90 01/12/2023 10:51 AM    LYMPHOCYTES 18.50 (L) 01/12/2023 10:51 AM    MONOCYTES 9.30 01/12/2023 10:51 AM    EOSINOPHILS 3.70 01/12/2023 10:51 AM    BASOPHILS 1.00 01/12/2023 10:51 AM        IMAGING REVIEWED AND INTERPRETED:    [place] [facility] [place]  Compared to the prior study on 4/4/22, no significant changes are   noted.  The left ventricular ejection fraction is visually estimated to be   55%.Calcified aortic valve leaflets. Likely low flow, low gradient   severe aortic stenosis.  Consider dobutamine echo.  Aortic valve area   is 0.67 cm^2.     CARDIAC CATHETERIZATION 5/19/23 [facility]  Hemodynamics:   Aorta: 115/66 mmHg  LVEDP: 23 mmHg  RA: 7 mmHg  RV: 27/10 mmHg  PA: 25/10/16 mmHg  PCWP: 10 mmHg  Cardiac Output/Index (thermodilution): 5.4 L/min, 2.7 L/min/m2  Cardiac Output/Index (Frida): 3.9 L/min,  1.9 L/min/m2  ABRAHAM: 0.8 mm2  Aortic valve mean gradient: 41 mmHg     Coronary Anatomy              Left Main:  10% distal stenosis              LAD:  40% proximal and 60% mid stenosis- unchanged from 2021              LCx:  30% proximal stenosis. Patent mid stent. OM1 is small and normal. OM2 is moderate and has <30% stenosis.               RCA: Dominant, 60% proximal stenosis. The PDA has 50% mid stenosis. The PLB has MLI    CT Chest Pending    IMPRESSION:  78 yo gentleman with known conditions of HLD, HTN, HFrEF, lip CA with radiation, tobacco use, aortic stenosis, atherosclerosis of the aorta, CAD s/p stent in 2021 to the circumflex, CKD stage III now with severe symptomatic aortic stenosis      PLAN:  I recommend that the patient is a reasonable TAVR candidate given advanced age, medical comorbidities and patient preference. We will continue to follow workup and discuss in multidisciplinary conference.    The STS mortality risk score is 1.9% and the morbidity and mortality risk score is 11.9%. The scores were discussed with patient.    Thank you for this very challenging consultation and participation in the patient’s care.  I will keep you apprised of all future developments.      Sincerely,     Ludivina Curtis MD

## 2023-06-07 ENCOUNTER — DOCUMENTATION (OUTPATIENT)
Dept: CARDIOLOGY | Facility: MEDICAL CENTER | Age: 78
End: 2023-06-07

## 2023-06-07 ENCOUNTER — OFFICE VISIT (OUTPATIENT)
Dept: CARDIOTHORACIC SURGERY | Facility: MEDICAL CENTER | Age: 78
End: 2023-06-07
Payer: MEDICARE

## 2023-06-07 ENCOUNTER — OFFICE VISIT (OUTPATIENT)
Dept: CARDIOLOGY | Facility: MEDICAL CENTER | Age: 78
End: 2023-06-07
Attending: INTERNAL MEDICINE
Payer: MEDICARE

## 2023-06-07 VITALS
WEIGHT: 181 LBS | DIASTOLIC BLOOD PRESSURE: 62 MMHG | BODY MASS INDEX: 25.91 KG/M2 | TEMPERATURE: 98.3 F | HEIGHT: 70 IN | SYSTOLIC BLOOD PRESSURE: 114 MMHG | OXYGEN SATURATION: 96 % | HEART RATE: 75 BPM

## 2023-06-07 VITALS
SYSTOLIC BLOOD PRESSURE: 116 MMHG | DIASTOLIC BLOOD PRESSURE: 60 MMHG | HEART RATE: 73 BPM | WEIGHT: 181 LBS | OXYGEN SATURATION: 93 % | BODY MASS INDEX: 25.91 KG/M2 | RESPIRATION RATE: 16 BRPM | HEIGHT: 70 IN

## 2023-06-07 DIAGNOSIS — I35.0 SEVERE AORTIC STENOSIS: ICD-10-CM

## 2023-06-07 DIAGNOSIS — I35.0 NONRHEUMATIC AORTIC VALVE STENOSIS: ICD-10-CM

## 2023-06-07 DIAGNOSIS — Z51.81 ENCOUNTER FOR MONITORING AMIODARONE THERAPY: ICD-10-CM

## 2023-06-07 DIAGNOSIS — Z79.899 ENCOUNTER FOR MONITORING AMIODARONE THERAPY: ICD-10-CM

## 2023-06-07 DIAGNOSIS — I71.21 ANEURYSM OF ASCENDING AORTA WITHOUT RUPTURE (HCC): ICD-10-CM

## 2023-06-07 DIAGNOSIS — Z00.6 EXAMINATION OF PARTICIPANT IN CLINICAL TRIAL: ICD-10-CM

## 2023-06-07 DIAGNOSIS — I25.10 CORONARY ARTERY DISEASE DUE TO LIPID RICH PLAQUE: ICD-10-CM

## 2023-06-07 DIAGNOSIS — I70.0 ATHEROSCLEROSIS OF AORTA (HCC): ICD-10-CM

## 2023-06-07 DIAGNOSIS — I48.19 PERSISTENT ATRIAL FIBRILLATION (HCC): ICD-10-CM

## 2023-06-07 DIAGNOSIS — N18.31 STAGE 3A CHRONIC KIDNEY DISEASE: ICD-10-CM

## 2023-06-07 DIAGNOSIS — I25.83 CORONARY ARTERY DISEASE DUE TO LIPID RICH PLAQUE: ICD-10-CM

## 2023-06-07 PROBLEM — I48.20 CHRONIC ATRIAL FIBRILLATION (HCC): Status: RESOLVED | Noted: 2021-06-10 | Resolved: 2023-06-07

## 2023-06-07 PROBLEM — I51.7 LVH (LEFT VENTRICULAR HYPERTROPHY): Status: ACTIVE | Noted: 2023-06-07

## 2023-06-07 PROBLEM — I48.0 PAROXYSMAL ATRIAL FIBRILLATION (HCC): Status: RESOLVED | Noted: 2020-09-18 | Resolved: 2023-06-07

## 2023-06-07 LAB — EKG IMPRESSION: NORMAL

## 2023-06-07 PROCEDURE — 93005 ELECTROCARDIOGRAM TRACING: CPT | Performed by: INTERNAL MEDICINE

## 2023-06-07 PROCEDURE — 99214 OFFICE O/P EST MOD 30 MIN: CPT | Performed by: THORACIC SURGERY (CARDIOTHORACIC VASCULAR SURGERY)

## 2023-06-07 PROCEDURE — 99213 OFFICE O/P EST LOW 20 MIN: CPT | Performed by: INTERNAL MEDICINE

## 2023-06-07 PROCEDURE — 3074F SYST BP LT 130 MM HG: CPT | Performed by: THORACIC SURGERY (CARDIOTHORACIC VASCULAR SURGERY)

## 2023-06-07 PROCEDURE — 3078F DIAST BP <80 MM HG: CPT | Performed by: THORACIC SURGERY (CARDIOTHORACIC VASCULAR SURGERY)

## 2023-06-07 PROCEDURE — 93010 ELECTROCARDIOGRAM REPORT: CPT | Performed by: INTERNAL MEDICINE

## 2023-06-07 PROCEDURE — 99215 OFFICE O/P EST HI 40 MIN: CPT | Mod: 25 | Performed by: INTERNAL MEDICINE

## 2023-06-07 PROCEDURE — 99212 OFFICE O/P EST SF 10 MIN: CPT | Performed by: INTERNAL MEDICINE

## 2023-06-07 PROCEDURE — 3078F DIAST BP <80 MM HG: CPT | Performed by: INTERNAL MEDICINE

## 2023-06-07 PROCEDURE — 3074F SYST BP LT 130 MM HG: CPT | Performed by: INTERNAL MEDICINE

## 2023-06-07 ASSESSMENT — ENCOUNTER SYMPTOMS
ORTHOPNEA: 1
DIZZINESS: 1
MUSCULOSKELETAL NEGATIVE: 1
PSYCHIATRIC NEGATIVE: 1
SHORTNESS OF BREATH: 1
GASTROINTESTINAL NEGATIVE: 1
FEVER: 0

## 2023-06-07 ASSESSMENT — FIBROSIS 4 INDEX
FIB4 SCORE: 1.6
FIB4 SCORE: 1.6

## 2023-06-07 ASSESSMENT — PATIENT HEALTH QUESTIONNAIRE - PHQ9: CLINICAL INTERPRETATION OF PHQ2 SCORE: 0

## 2023-06-07 NOTE — PROGRESS NOTES
Valve Program Consultation: 06/07/2023 for tentative TAVR    Mental Status Assessment:  Appearance: normal  Behavior: normal  Mood/Affect: normal  Thought process/content: slightly limited  Cognition: slightly limited  Functional ability: normal  Dental Concerns: Patient has a few natural teeth remaining as a result of the majority being removed due to throat cancer. Patient denies s/s of active oral infection/irritation, but remaining teeth should be evaluated by dentist. Patient aware he needs to schedule urgent appointment. Dental clearance letter given to patient to have completed at visit.   Further mental assessment needed: no    Post-op Plan of Care:  Support systems: patient alone at consult today, he states he has some friends for support like his , Lacho, but otherwise he lacks a support system  Patient understands discharge plan: yes  DME: none  Home health warranted prior to procedure: no  Social concerns for discharge: Patient lives in a Senior Apartment complex and lacks a support system.   PCP alerted of social concerns: n/a  POLST: none   Advance directive: none, Living Will in safety deposit box, recommended patient bring copy to have on file at [facility]  Flu/PNA/COVID vaccines completed: no, advised patient not to have any new vaccinations 3 days prior to procedure or one week post procedure  Post-op goal: Live longer  [place]  Medication instructions: hold Eliquis x 48 hours prior to procedure, hold Lasix and Spironolactone morning of procedure    Concerns prior to procedure: Patient does not have a phone and has no desire to get one. Discussed the importance of needing to reach patient to discuss VC decision and check in information if applicable. Attempted to set patient up with non-provider visit next week to review information, but he declined. Per patient, he will call on 06/14/2023 between 10:30-11:00 am from a pay phone and review information.     Met with patient during  New TAVR consult.     All patient's questions and concerns were addressed during this visit. They understood pre-operative and post-operative plan of care.    Reviewed patient TAVR education packet explaining that information is provided regarding preparation for TAVR the night prior, what to expect during the hospital stay, average LOS, and what to look out for post TAVR discharge. Explained that patient will require SBE prophylactic antibiotic prior to any dental treatment post TAVR.     Explained that patient should not eat or drink anything past midnight the day of the procedure. Encouraged patient to wear something clean and comfortable, easy to get on/off to check in Monday morning, time TBD. Patient may have friends and family in the pre-operational area until patient is taken to the operating room, at which time family and friends will be asked to wait on floor 1 [facility] surgical waiting area. On completion of TAVR, heart team will update family. Once update is given, there is some time before family/friends may visit patient in their assigned room. Length of stay on average is one night, however patient may stay longer depending on specific needs at that time. Patient given printed instructions sheet with all above stated instructions. Patient states understanding of all material and education presented today and has no further questions at this time. Encouraged patient again, to contact me with any questions or concerns during this work-up process. Patient states understanding.

## 2023-06-07 NOTE — PROGRESS NOTES
"CARDIOLOGY STRUCTURAL HEART FOLLOWUP    PCP: Pcp Pt States None  Primary cardiologist: Manju    1. Nonrheumatic aortic valve stenosis    2. Coronary artery disease due to lipid rich plaque    3. Stage 3a chronic kidney disease (HCC)    4. Aneurysm of ascending aorta without rupture (HCC)    5. Atherosclerosis of aorta (HCC)    6. Persistent atrial fibrillation (HCC)        Rajesh abbasi has class II, stage D1 aortic stenosis.  I advised proceeding with aortic valve replacement-final determination of technique pending valve team discussion.  He does have a 4.4 cm thoracic aortic aneurysm.  He does need dental assessment prior to proceeding.    We discussed the risks, benefits and alternatives to TAVR including but not limited to a 10% risk of pacemaker, 5% risk of bleeding/access site complication, 2% risk of stroke, risk of contrast nephropathy and 2% risk of mortality. The patient is in agreement with proceeding.    Coronary disease is stable and I had him stop clopidogrel.    Follow up: 6 weeks    History: Rajesh De Luna is a 77 y.o. male with history of oral cancer treated with definitive radiation, persistent A-fib, CKD, chronic heart failure with reduced ejection fraction-LVEF recovered from a cande of 35% as well as coronary artery disease with prior circumflex PCI presenting for follow-up of aortic stenosis.  Following the last visit, catheterization showed mean gradient of 41 mmHg.  He continues to experience exertional fatigue and breathlessness    .      PE:  /60 (BP Location: Left arm, Patient Position: Sitting, BP Cuff Size: Adult)   Pulse 73   Resp 16   Ht 1.778 m (5' 10\")   Wt 82.1 kg (181 lb)   SpO2 93%   BMI 25.97 kg/m²   GEN: NAD  CARDIAC: +LUIS E regular no JVP Normal S1 Diminished S2  VASCULATURE: diminished and delayed carotid pulse  RESP: Clear to auscultation bilaterally  ABD: Soft, non-tender, non-distended  EXT: No edema  NEURO: No focal deficit    Past " Medical History:   Diagnosis Date    A-fib (HCC)     Anxiety     Atrial flutter (HCC)     Breath shortness     with exertion    Cancer (HCC) 2018    Lip cancer    Dental disorder     no teeth    Depression     Heart burn     High cholesterol     History of chronic cough     Hypertension     Stage 3b chronic kidney disease (CKD) (HCC)     SVT [facility]supraventricular tachycardia) ([facility])     Urinary incontinence      Allergies   Allergen Reactions    Penicillins Hives            Outpatient Encounter Medications as of 6/7/2023   Medication Sig Dispense Refill    apixaban (ELIQUIS) 5mg Tab Take 1 tablet by mouth 2 times a day. 180 Tablet 3    amiodarone (CORDARONE) 200 MG Tab Take 1 Tablet by mouth every day. 100 Tablet 1    metoprolol SR (TOPROL XL) 50 MG TABLET SR 24 HR Take 3 Tablets by mouth every day. 180 Tablet 5    spironolactone (ALDACTONE) 25 MG Tab Take 1 Tablet by mouth every day. 100 Tablet 3    atorvastatin (LIPITOR) 40 MG Tab Take 1 tablet by mouth every day. 90 Tablet 1    furosemide (LASIX) 20 MG Tab Take 1 Tablet by mouth 1 time a day as needed. Take as needed for weight gain greater than 3 pounds in 1 day or 5 pounds in 1 week. 90 Tablet 3    [DISCONTINUED] clopidogrel (PLAVIX) 75 MG Tab Take 1 tablet by mouth every day. 90 Tablet 3     No facility-administered encounter medications on file as of 6/7/2023.     Social History     Socioeconomic History    Marital status: Single     Spouse name: Not on file    Number of children: Not on file    Years of education: Not on file    Highest education level: Not on file   Occupational History    Not on file   Tobacco Use    Smoking status: Some Days     Types: Cigars    Smokeless tobacco: Never    Tobacco comments:     smoke after dinner   Vaping Use    Vaping Use: Never used   Substance and Sexual Activity    Alcohol use: Yes     Alcohol/week: 8.4 oz     Types: 14 Cans of beer per week     Comment: reports 2/day    Drug use: Not Currently    Sexual activity: Not on  file   Other Topics Concern    Not on file   Social History Narrative    Not on file     Social Determinants of Health     Financial Resource Strain: Not on file   Food Insecurity: Not on file   Transportation Needs: Not on file   Physical Activity: Not on file   Stress: Not on file   Social Connections: Not on file   Intimate Partner Violence: Not on file   Housing Stability: Not on file       Studies  Lab Results   Component Value Date/Time    CHOLSTRLTOT 108 06/13/2021 02:35 AM    LDL 53 06/13/2021 02:35 AM    HDL 40 06/13/2021 02:35 AM    TRIGLYCERIDE 75 06/13/2021 02:35 AM       Lab Results   Component Value Date/Time    SODIUM 141 05/15/2023 03:09 PM    POTASSIUM 4.3 05/15/2023 03:09 PM    CHLORIDE 105 05/15/2023 03:09 PM    CO2 23 05/15/2023 03:09 PM    GLUCOSE 107 (H) 05/15/2023 03:09 PM    BUN 22 05/15/2023 03:09 PM    CREATININE 1.32 05/15/2023 03:09 PM     Lab Results   Component Value Date/Time    ALKPHOSPHAT 123 (H) 05/15/2023 03:09 PM    ASTSGOT 16 05/15/2023 03:09 PM    ALTSGPT 11 05/15/2023 03:09 PM    TBILIRUBIN 0.6 05/15/2023 03:09 PM                      Chief Complaint   Patient presents with    Aortic Stenosis     ROS:   10 point review systems is otherwise negative except as per the HPI    Today we discussed major cardiovascular surgery with associated patient risk factors-CKD, age

## 2023-06-07 NOTE — PROGRESS NOTES
Valve Program Functional Assessment: Pre-op    [id number]   1a) Showering/bathin  1b) Walking 1 block on ground: 4  1c) Hurrying or joggin  2) Swellin  3) Fatigue: 5  4) Shortness of breath: 5  5) Sleep sitting up: 5  6) Limited enjoyment of life: N/A  7) Spend the rest of your life with HF: N/A  8a) Hobbies, recreational activities:N/A  8b) Working or doing household chores:N/A  8c) Visiting family or friends: N/A    5 meter walk test  1) __4.48____ s/5m  2) __4.35____ s/5m  3) __4.80____ s/5m  AVG:_4.54______     Strength   1) _24_____ kg  2) _26_____ kg  3) _20_____ kg  AVG:__23.3____    TABARES ADLs  Patient independently preforms...   - Bathing: Yes No  - Dressing: Yes No  - Toileting: Yes No  - Transferring: Yes No  - Continence: Yes No  - Feeding: Yes No  Total Score: _6_/6    Living Situation  Patient lives: alone    Mobility Aids   Patient uses: none      FRAILTY SCORE: _0_/ 4

## 2023-06-08 ENCOUNTER — HOSPITAL ENCOUNTER (OUTPATIENT)
Facility: MEDICAL CENTER | Age: 78
End: 2023-06-08
Attending: INTERNAL MEDICINE | Admitting: INTERNAL MEDICINE
Payer: MEDICARE

## 2023-06-13 ENCOUNTER — DOCUMENTATION (OUTPATIENT)
Dept: CARDIOLOGY | Facility: MEDICAL CENTER | Age: 78
End: 2023-06-13
Payer: MEDICARE

## 2023-06-13 NOTE — PROGRESS NOTES
Sosa TAVR Review:     Consider a 26 S3UR from right femoral approach.    LVH  Tortuous iliacs bilaterally  Moderate/severe calcium in the distal aorta continuing into the femorals bilaterally  Cr4

## 2023-06-14 ENCOUNTER — TELEPHONE (OUTPATIENT)
Dept: CARDIOLOGY | Facility: MEDICAL CENTER | Age: 78
End: 2023-06-14
Payer: MEDICARE

## 2023-06-14 NOTE — TELEPHONE ENCOUNTER
Valve Conference Plan of Care: 06/14/2023 for tentative TAVR 06/19/2023           TAVR Candidate: Yes, pending dental clearance  Access: right femoral  Valve Size: 26 mm Ultra  General Anesthesia or MAC: GA with PORTILLO  Unit: Telemetry  Incidental findings to be discussed with PCP: chronic left renal cortical scarring, and diverticulosis.   Clearance needed prior to procedure: yes, dental clearance needed prior to proceeding    Check in time of 0700 06/19/2023.     Pre-op appointment completed: no    NPO after midnight. Hold Eliquis 48 hours prior to procedure, hold Lasix and Spironolactone morning of procedure.     Patient to call between 10:30-11:00 am to review pre procedure information. Will await call.

## 2023-06-14 NOTE — TELEPHONE ENCOUNTER
Patient called Haley RN's line to discuss valve conference decision. Call was dropped multiple times and he would call back on different phone numbers. He is using different people's phones from the bus stop. He finally called for the 4th time and states he has a dentist appt today at 1300. He's unable to tell me the dentist's name, location, or phone number. Advised if he doesn't call us after his dentist appt today, and/or if we don't receive clearance, we will have to cancel his TAVR for Monday. Patient verbalizes understanding and will call us after his dentist appt.

## 2023-06-15 NOTE — TELEPHONE ENCOUNTER
Have not heard back from patient or dentist regarding clearance. TAVR procedure 6/19/2023 has been cancelled. Unable to communicate chapin patient d/t no phone or address.

## 2023-06-15 NOTE — TELEPHONE ENCOUNTER
Patient did not return call following dental appointment to confirm clearance. TAVR procedure to be cancelled at this time.

## 2023-06-16 ENCOUNTER — PHARMACY VISIT (OUTPATIENT)
Dept: PHARMACY | Facility: MEDICAL CENTER | Age: 78
End: 2023-06-16
Payer: MEDICARE

## 2023-06-16 PROCEDURE — RXMED WILLOW AMBULATORY MEDICATION CHARGE: Performed by: INTERNAL MEDICINE

## 2023-07-19 ENCOUNTER — PHARMACY VISIT (OUTPATIENT)
Dept: PHARMACY | Facility: MEDICAL CENTER | Age: 78
End: 2023-07-19
Payer: MEDICARE

## 2023-07-19 PROCEDURE — RXMED WILLOW AMBULATORY MEDICATION CHARGE: Performed by: INTERNAL MEDICINE

## 2023-08-04 ENCOUNTER — TELEPHONE (OUTPATIENT)
Dept: CARDIOLOGY | Facility: MEDICAL CENTER | Age: 78
End: 2023-08-04
Payer: MEDICARE

## 2023-08-04 NOTE — TELEPHONE ENCOUNTER
Tried to call patient about labs that were ordered. Phone number on file is for the apartment complex. Did not leave message, per previous notes patient does not have a working number

## 2023-08-17 ENCOUNTER — TELEPHONE (OUTPATIENT)
Dept: CARDIOLOGY | Facility: MEDICAL CENTER | Age: 78
End: 2023-08-17
Payer: MEDICARE

## 2023-08-17 NOTE — TELEPHONE ENCOUNTER
KRISTINA     CALLED PT ABOUT MISSED APPT 8/15/23 WITH ELA VILLASENOR, GOT  GENERAL VM BOX FOR THE [facility]. NO MESSAGE LEFT.

## 2023-08-30 ENCOUNTER — PHARMACY VISIT (OUTPATIENT)
Dept: PHARMACY | Facility: MEDICAL CENTER | Age: 78
End: 2023-08-30
Payer: MEDICARE

## 2023-08-30 PROCEDURE — RXMED WILLOW AMBULATORY MEDICATION CHARGE: Performed by: NURSE PRACTITIONER

## 2023-09-14 ENCOUNTER — PHARMACY VISIT (OUTPATIENT)
Dept: PHARMACY | Facility: MEDICAL CENTER | Age: 78
End: 2023-09-14
Payer: MEDICARE

## 2023-09-14 PROCEDURE — RXMED WILLOW AMBULATORY MEDICATION CHARGE: Performed by: INTERNAL MEDICINE

## 2023-09-14 RX ORDER — AMIODARONE HYDROCHLORIDE 200 MG/1
200 TABLET ORAL DAILY
Qty: 100 TABLET | Refills: 1 | Status: CANCELLED | OUTPATIENT
Start: 2023-09-14

## 2023-09-26 ENCOUNTER — PHARMACY VISIT (OUTPATIENT)
Dept: PHARMACY | Facility: MEDICAL CENTER | Age: 78
End: 2023-09-26
Payer: MEDICARE

## 2023-09-26 PROCEDURE — RXMED WILLOW AMBULATORY MEDICATION CHARGE: Performed by: STUDENT IN AN ORGANIZED HEALTH CARE EDUCATION/TRAINING PROGRAM

## 2023-09-26 RX ORDER — CLINDAMYCIN HYDROCHLORIDE 150 MG/1
CAPSULE ORAL
Qty: 60 CAPSULE | Refills: 0 | OUTPATIENT
Start: 2023-09-25

## 2023-10-19 ENCOUNTER — PHARMACY VISIT (OUTPATIENT)
Dept: PHARMACY | Facility: MEDICAL CENTER | Age: 78
End: 2023-10-19
Payer: MEDICARE

## 2023-10-19 PROCEDURE — RXMED WILLOW AMBULATORY MEDICATION CHARGE: Performed by: INTERNAL MEDICINE

## 2023-10-19 RX ORDER — AMIODARONE HYDROCHLORIDE 200 MG/1
200 TABLET ORAL DAILY
Qty: 100 TABLET | Refills: 1 | Status: CANCELLED | OUTPATIENT
Start: 2023-10-19

## 2023-10-20 DIAGNOSIS — I48.19 PERSISTENT ATRIAL FIBRILLATION (HCC): ICD-10-CM

## 2023-10-24 RX ORDER — AMIODARONE HYDROCHLORIDE 200 MG/1
200 TABLET ORAL DAILY
Qty: 90 TABLET | Refills: 1 | Status: SHIPPED | OUTPATIENT
Start: 2023-10-24 | End: 2024-02-01 | Stop reason: SDUPTHER

## 2023-10-25 ENCOUNTER — PHARMACY VISIT (OUTPATIENT)
Dept: PHARMACY | Facility: MEDICAL CENTER | Age: 78
End: 2023-10-25
Payer: MEDICARE

## 2023-10-25 PROCEDURE — RXMED WILLOW AMBULATORY MEDICATION CHARGE: Performed by: INTERNAL MEDICINE

## 2023-10-26 ENCOUNTER — TELEPHONE (OUTPATIENT)
Dept: CARDIOLOGY | Facility: MEDICAL CENTER | Age: 78
End: 2023-10-26
Payer: MEDICARE

## 2023-10-26 NOTE — TELEPHONE ENCOUNTER
Patient has not had follow up with our office since June. He was a no show to scheduled appointment in August.     Attempted to contact patient. Unable to leave message as number on file is still for apartment complex.     Letter generated and placed in mail to patient. Requested that patient contact [facility] to schedule visit.

## 2023-10-26 NOTE — LETTER
October 26, 2023        Rajesh De Luna  [address]  [place] [address]        Dear Rajesh,      We have been unable to reach you to follow up on how you have been feeling since we last saw you. You were scheduled for an appointment in August, but did not attend that visit.      Please call our office at [phone] to schedule a follow up appointment with Dr. Nunes.           Sincerely,           BRIDGET De Leon, RN  [facility]ural [facility] Program Nurse Coordinator

## 2023-12-07 ENCOUNTER — TELEPHONE (OUTPATIENT)
Dept: CARDIOLOGY | Facility: MEDICAL CENTER | Age: 78
End: 2023-12-07
Payer: MEDICARE

## 2023-12-07 NOTE — TELEPHONE ENCOUNTER
Received call from patient and his friend regarding upcoming appointment next week. Date and time confirmed.

## 2023-12-12 ENCOUNTER — OFFICE VISIT (OUTPATIENT)
Dept: CARDIOLOGY | Facility: MEDICAL CENTER | Age: 78
End: 2023-12-12
Attending: INTERNAL MEDICINE
Payer: MEDICARE

## 2023-12-12 VITALS
WEIGHT: 180 LBS | HEART RATE: 59 BPM | DIASTOLIC BLOOD PRESSURE: 60 MMHG | RESPIRATION RATE: 16 BRPM | BODY MASS INDEX: 25.77 KG/M2 | OXYGEN SATURATION: 95 % | SYSTOLIC BLOOD PRESSURE: 116 MMHG | HEIGHT: 70 IN

## 2023-12-12 DIAGNOSIS — I48.3 TYPICAL ATRIAL FLUTTER (HCC): ICD-10-CM

## 2023-12-12 DIAGNOSIS — K02.9 DENTAL DECAY: ICD-10-CM

## 2023-12-12 DIAGNOSIS — I71.21 ANEURYSM OF ASCENDING AORTA WITHOUT RUPTURE (HCC): ICD-10-CM

## 2023-12-12 DIAGNOSIS — I48.19 PERSISTENT ATRIAL FIBRILLATION (HCC): ICD-10-CM

## 2023-12-12 DIAGNOSIS — I48.20 CHRONIC ATRIAL FIBRILLATION (HCC): ICD-10-CM

## 2023-12-12 DIAGNOSIS — N18.32 STAGE 3B CHRONIC KIDNEY DISEASE: ICD-10-CM

## 2023-12-12 DIAGNOSIS — I25.10 CORONARY ARTERY DISEASE DUE TO LIPID RICH PLAQUE: ICD-10-CM

## 2023-12-12 DIAGNOSIS — I10 PRIMARY HYPERTENSION: ICD-10-CM

## 2023-12-12 DIAGNOSIS — I35.0 NONRHEUMATIC AORTIC VALVE STENOSIS: ICD-10-CM

## 2023-12-12 DIAGNOSIS — D68.69 HYPERCOAGULABLE STATE DUE TO PAROXYSMAL ATRIAL FIBRILLATION (HCC): ICD-10-CM

## 2023-12-12 DIAGNOSIS — I50.20 HFREF (HEART FAILURE WITH REDUCED EJECTION FRACTION) (HCC): ICD-10-CM

## 2023-12-12 DIAGNOSIS — I48.0 HYPERCOAGULABLE STATE DUE TO PAROXYSMAL ATRIAL FIBRILLATION (HCC): ICD-10-CM

## 2023-12-12 DIAGNOSIS — I10 HYPERTENSION, UNSPECIFIED TYPE: ICD-10-CM

## 2023-12-12 DIAGNOSIS — I50.9 HEART FAILURE, NYHA CLASS 3 (HCC): ICD-10-CM

## 2023-12-12 DIAGNOSIS — I70.0 ATHEROSCLEROSIS OF AORTA (HCC): ICD-10-CM

## 2023-12-12 DIAGNOSIS — I47.10 SVT (SUPRAVENTRICULAR TACHYCARDIA) (HCC): ICD-10-CM

## 2023-12-12 DIAGNOSIS — I25.83 CORONARY ARTERY DISEASE DUE TO LIPID RICH PLAQUE: ICD-10-CM

## 2023-12-12 DIAGNOSIS — E78.5 HYPERLIPIDEMIA, UNSPECIFIED HYPERLIPIDEMIA TYPE: ICD-10-CM

## 2023-12-12 PROCEDURE — RXMED WILLOW AMBULATORY MEDICATION CHARGE: Performed by: INTERNAL MEDICINE

## 2023-12-12 PROCEDURE — 3074F SYST BP LT 130 MM HG: CPT | Performed by: INTERNAL MEDICINE

## 2023-12-12 PROCEDURE — 3078F DIAST BP <80 MM HG: CPT | Performed by: INTERNAL MEDICINE

## 2023-12-12 PROCEDURE — 99212 OFFICE O/P EST SF 10 MIN: CPT | Performed by: INTERNAL MEDICINE

## 2023-12-12 PROCEDURE — 99215 OFFICE O/P EST HI 40 MIN: CPT | Performed by: INTERNAL MEDICINE

## 2023-12-12 RX ORDER — FUROSEMIDE 20 MG/1
20 TABLET ORAL
Qty: 90 TABLET | Refills: 3 | Status: SHIPPED | OUTPATIENT
Start: 2023-12-12

## 2023-12-12 RX ORDER — SPIRONOLACTONE 25 MG/1
25 TABLET ORAL DAILY
Qty: 100 TABLET | Refills: 3 | Status: SHIPPED | OUTPATIENT
Start: 2023-12-12

## 2023-12-12 RX ORDER — ATORVASTATIN CALCIUM 40 MG/1
40 TABLET, FILM COATED ORAL DAILY
Qty: 90 TABLET | Refills: 1 | Status: SHIPPED | OUTPATIENT
Start: 2023-12-12

## 2023-12-12 RX ORDER — AMIODARONE HYDROCHLORIDE 200 MG/1
200 TABLET ORAL DAILY
Qty: 90 TABLET | Refills: 1 | Status: SHIPPED | OUTPATIENT
Start: 2023-12-12

## 2023-12-12 RX ORDER — METOPROLOL SUCCINATE 50 MG/1
150 TABLET, EXTENDED RELEASE ORAL DAILY
Qty: 180 TABLET | Refills: 5 | Status: SHIPPED | OUTPATIENT
Start: 2023-12-12

## 2023-12-12 ASSESSMENT — FIBROSIS 4 INDEX: FIB4 SCORE: 1.65

## 2023-12-12 NOTE — PROGRESS NOTES
"CARDIOLOGY OUTPATIENT FOLLOWUP    PCP: Pcp Pt States None    1. Nonrheumatic aortic valve stenosis    2. HFrEF    3. Stage 3b chronic kidney disease (HCC)    4. Aneurysm of ascending aorta without rupture (HCC)    5. Dental decay    6. Coronary artery disease due to lipid rich plaque    7. Atherosclerosis of aorta (HCC)    8. Typical atrial flutter (HCC)    9. Hypercoagulable state due to paroxysmal atrial fibrillation (HCC)        Rajesh De Luna has class II, stage D1 aortic stenosis.  We again advised moving forward with aortic valve replacement though he remains unsure as he is largely feeling content with his capacities.  I did recommend he update an echocardiogram as well as a BNP and other laboratory profile.  I did caution him about delaying valve surgery too long and he is understanding of the risks.  He will consider and get back to me should he wish to proceed.  If he does so then we would need to readdress dental clearance.    Follow up: 3 months    History: Rajesh De Luna is a 78 y.o. male history of oral cancer treated with definitive radiation, persistent A-fib, CKD, chronic heart failure with reduced ejection fraction-LVEF recovered from a cande of 35% as well as coronary artery disease with prior circumflex PCI presenting for follow-up of aortic stenosis.     Following our last visit he was referred for dental evaluation but did not care for the dentist and subsequently has been lost to follow-up.  He reports himself to be in stable health and is actually improved some in his physical capacities due to weight loss.  He does experience exertional fatigue most noted in the thighs when walking.  He has been reluctant to undergo valve replacement.    Physical Exam:  /60 (BP Location: Left arm, Patient Position: Sitting, BP Cuff Size: Adult)   Pulse (!) 59   Resp 16   Ht 1.778 m (5' 10\")   Wt 81.6 kg (180 lb)   SpO2 95%   BMI 25.83 kg/m²   GEN: NAD  CARDIAC: Regular Systolic " ejection murmur Normal S1, S2 Diminished and delayed carotid upstrokes  RESP: Clear to auscultation bilaterally  ABD: Soft, non-tender, non-distended  EXT: No edema  NEURO: No focal deficit    The ASCVD Risk score (Rula DK, et al., 2019) failed to calculate.    ECG directly interpreted by myself shows normal QRS width, sinus rhythm    Studies  Lab Results   Component Value Date/Time    CHOLSTRLTOT 108 06/13/2021 02:35 AM    LDL 53 06/13/2021 02:35 AM    HDL 40 06/13/2021 02:35 AM    TRIGLYCERIDE 75 06/13/2021 02:35 AM       Lab Results   Component Value Date/Time    SODIUM 143 09/25/2023 03:29 PM    SODIUM 141 05/15/2023 03:09 PM    POTASSIUM 3.9 09/25/2023 03:29 PM    POTASSIUM 4.3 05/15/2023 03:09 PM    CHLORIDE 108 09/25/2023 03:29 PM    CHLORIDE 105 05/15/2023 03:09 PM    CO2 27.0 09/25/2023 03:29 PM    CO2 23 05/15/2023 03:09 PM    GLUCOSE 108 09/25/2023 03:29 PM    GLUCOSE 107 (H) 05/15/2023 03:09 PM    BUN 16.0 09/25/2023 03:29 PM    BUN 22 05/15/2023 03:09 PM    CREATININE 1.2 09/25/2023 03:29 PM    CREATININE 1.32 05/15/2023 03:09 PM    BUNCREATRAT 13.3 09/25/2023 03:29 PM     Lab Results   Component Value Date/Time    ALKPHOSPHAT 133 (H) 09/25/2023 03:29 PM    ALKPHOSPHAT 123 (H) 05/15/2023 03:09 PM    ASTSGOT 16 09/25/2023 03:29 PM    ASTSGOT 16 05/15/2023 03:09 PM    ALTSGPT 10 09/25/2023 03:29 PM    ALTSGPT 11 05/15/2023 03:09 PM    TBILIRUBIN 0.7 09/25/2023 03:29 PM    TBILIRUBIN 0.6 05/15/2023 03:09 PM        Past Medical History:   Diagnosis Date    A-fib (HCC)     Anxiety     Atrial flutter (HCC)     Breath shortness     with exertion    Cancer (HCC) 2018    Lip cancer    Dental disorder     no teeth    Depression     Heart burn     High cholesterol     History of chronic cough     Hypertension     Stage 3b chronic kidney disease (CKD) (HCC)     SVT (supraventricular tachycardia) (HCC)     Urinary incontinence      Allergies   Allergen Reactions    Penicillins Hives            Outpatient Encounter  Medications as of 12/12/2023   Medication Sig Dispense Refill    amiodarone (CORDARONE) 200 MG Tab Take 1 Tablet by mouth every day. 90 Tablet 1    clindamycin (CLEOCIN) 150 MG Cap Take 2 capsules (300 mg total) by mouth 3 (three) times a day for 10 days 60 Capsule 0    apixaban (ELIQUIS) 5mg Tab Take 1 tablet by mouth 2 times a day. 180 Tablet 3    metoprolol SR (TOPROL XL) 50 MG TABLET SR 24 HR Take 3 Tablets by mouth every day. 180 Tablet 5    spironolactone (ALDACTONE) 25 MG Tab Take 1 Tablet by mouth every day. 100 Tablet 3    atorvastatin (LIPITOR) 40 MG Tab Take 1 tablet by mouth every day. 90 Tablet 1    furosemide (LASIX) 20 MG Tab Take 1 Tablet by mouth 1 time a day as needed. Take as needed for weight gain greater than 3 pounds in 1 day or 5 pounds in 1 week. 90 Tablet 3     No facility-administered encounter medications on file as of 12/12/2023.     Social History     Socioeconomic History    Marital status: Single     Spouse name: Not on file    Number of children: Not on file    Years of education: Not on file    Highest education level: Not on file   Occupational History    Not on file   Tobacco Use    Smoking status: Some Days     Types: Cigars    Smokeless tobacco: Never    Tobacco comments:     smoke after dinner   Vaping Use    Vaping Use: Never used   Substance and Sexual Activity    Alcohol use: Yes     Alcohol/week: 8.4 oz     Types: 14 Cans of beer per week     Comment: reports 2/day    Drug use: Not Currently    Sexual activity: Not on file   Other Topics Concern    Not on file   Social History Narrative    Not on file     Social Determinants of Health     Financial Resource Strain: Not on file   Food Insecurity: Not on file   Transportation Needs: Not on file   Physical Activity: Not on file   Stress: Not on file   Social Connections: Not on file   Intimate Partner Violence: Not on file   Housing Stability: Not on file     Today's encounter addressed a chronic illness that poses a threat to  life and bodily function.-Severe aortic stenosis    ROS:   10 point review systems is otherwise negative except as per the HPI    No chief complaint on file.

## 2023-12-15 PROCEDURE — RXMED WILLOW AMBULATORY MEDICATION CHARGE: Performed by: INTERNAL MEDICINE

## 2023-12-19 ENCOUNTER — PHARMACY VISIT (OUTPATIENT)
Dept: PHARMACY | Facility: MEDICAL CENTER | Age: 78
End: 2023-12-19
Payer: MEDICARE

## 2023-12-19 ENCOUNTER — HOSPITAL ENCOUNTER (OUTPATIENT)
Dept: CARDIOLOGY | Facility: MEDICAL CENTER | Age: 78
End: 2023-12-19
Attending: INTERNAL MEDICINE
Payer: MEDICARE

## 2023-12-19 DIAGNOSIS — I35.0 NONRHEUMATIC AORTIC VALVE STENOSIS: ICD-10-CM

## 2023-12-19 LAB
LV EJECT FRACT  99904: 55
LV EJECT FRACT MOD 2C 99903: 64.24
LV EJECT FRACT MOD 4C 99902: 44.73
LV EJECT FRACT MOD BP 99901: 56.33

## 2023-12-19 PROCEDURE — 93306 TTE W/DOPPLER COMPLETE: CPT

## 2023-12-19 PROCEDURE — 93306 TTE W/DOPPLER COMPLETE: CPT | Mod: 26 | Performed by: INTERNAL MEDICINE

## 2024-02-01 DIAGNOSIS — I48.19 PERSISTENT ATRIAL FIBRILLATION (HCC): ICD-10-CM

## 2024-02-01 PROCEDURE — RXMED WILLOW AMBULATORY MEDICATION CHARGE: Performed by: INTERNAL MEDICINE

## 2024-02-01 RX ORDER — AMIODARONE HYDROCHLORIDE 200 MG/1
200 TABLET ORAL DAILY
Qty: 90 TABLET | Refills: 1 | Status: SHIPPED | OUTPATIENT
Start: 2024-02-01

## 2024-02-02 ENCOUNTER — PHARMACY VISIT (OUTPATIENT)
Dept: PHARMACY | Facility: MEDICAL CENTER | Age: 79
End: 2024-02-02
Payer: COMMERCIAL

## 2024-03-08 DIAGNOSIS — I25.10 CORONARY ARTERY DISEASE DUE TO LIPID RICH PLAQUE: ICD-10-CM

## 2024-03-08 DIAGNOSIS — I25.83 CORONARY ARTERY DISEASE DUE TO LIPID RICH PLAQUE: ICD-10-CM

## 2024-03-15 ENCOUNTER — PHARMACY VISIT (OUTPATIENT)
Dept: PHARMACY | Facility: MEDICAL CENTER | Age: 79
End: 2024-03-15
Payer: COMMERCIAL

## 2024-03-15 PROCEDURE — RXMED WILLOW AMBULATORY MEDICATION CHARGE: Performed by: INTERNAL MEDICINE

## 2024-03-22 ENCOUNTER — TELEPHONE (OUTPATIENT)
Dept: CARDIOLOGY | Facility: MEDICAL CENTER | Age: 79
End: 2024-03-22
Payer: MEDICARE

## 2024-03-22 NOTE — TELEPHONE ENCOUNTER
Called pt in regards to pending blood work needed to be done. Pt did not answer, LVM providing call back number.

## 2024-04-10 ENCOUNTER — OFFICE VISIT (OUTPATIENT)
Dept: CARDIOLOGY | Facility: MEDICAL CENTER | Age: 79
End: 2024-04-10
Attending: INTERNAL MEDICINE
Payer: MEDICARE

## 2024-04-10 VITALS
WEIGHT: 185 LBS | BODY MASS INDEX: 26.48 KG/M2 | OXYGEN SATURATION: 96 % | SYSTOLIC BLOOD PRESSURE: 110 MMHG | HEIGHT: 70 IN | HEART RATE: 65 BPM | DIASTOLIC BLOOD PRESSURE: 60 MMHG | RESPIRATION RATE: 18 BRPM

## 2024-04-10 DIAGNOSIS — I47.10 SVT (SUPRAVENTRICULAR TACHYCARDIA) (HCC): ICD-10-CM

## 2024-04-10 DIAGNOSIS — I25.83 CORONARY ARTERY DISEASE DUE TO LIPID RICH PLAQUE: ICD-10-CM

## 2024-04-10 DIAGNOSIS — I71.21 ANEURYSM OF ASCENDING AORTA WITHOUT RUPTURE (HCC): ICD-10-CM

## 2024-04-10 DIAGNOSIS — I35.0 NONRHEUMATIC AORTIC VALVE STENOSIS: ICD-10-CM

## 2024-04-10 DIAGNOSIS — I70.0 ATHEROSCLEROSIS OF AORTA (HCC): ICD-10-CM

## 2024-04-10 DIAGNOSIS — N18.32 STAGE 3B CHRONIC KIDNEY DISEASE: ICD-10-CM

## 2024-04-10 DIAGNOSIS — I25.10 CORONARY ARTERY DISEASE DUE TO LIPID RICH PLAQUE: ICD-10-CM

## 2024-04-10 DIAGNOSIS — I48.3 TYPICAL ATRIAL FLUTTER (HCC): ICD-10-CM

## 2024-04-10 DIAGNOSIS — I50.20 HFREF (HEART FAILURE WITH REDUCED EJECTION FRACTION) (HCC): ICD-10-CM

## 2024-04-10 LAB — EKG IMPRESSION: NORMAL

## 2024-04-10 PROCEDURE — 93010 ELECTROCARDIOGRAM REPORT: CPT | Performed by: INTERNAL MEDICINE

## 2024-04-10 PROCEDURE — 3074F SYST BP LT 130 MM HG: CPT | Performed by: INTERNAL MEDICINE

## 2024-04-10 PROCEDURE — 99212 OFFICE O/P EST SF 10 MIN: CPT | Performed by: INTERNAL MEDICINE

## 2024-04-10 PROCEDURE — 93005 ELECTROCARDIOGRAM TRACING: CPT | Performed by: INTERNAL MEDICINE

## 2024-04-10 PROCEDURE — 3078F DIAST BP <80 MM HG: CPT | Performed by: INTERNAL MEDICINE

## 2024-04-10 PROCEDURE — 99214 OFFICE O/P EST MOD 30 MIN: CPT | Performed by: INTERNAL MEDICINE

## 2024-04-10 ASSESSMENT — FIBROSIS 4 INDEX: FIB4 SCORE: 1.65

## 2024-04-10 NOTE — PROGRESS NOTES
"CARDIOLOGY OUTPATIENT FOLLOWUP    PCP: Pcp Pt States None    1. Nonrheumatic aortic valve stenosis    2. Coronary artery disease due to lipid rich plaque    3. HFrEF    4. Atherosclerosis of aorta (HCC)    5. Typical atrial flutter (HCC)    6. Aneurysm of ascending aorta without rupture (HCC)    7. Stage 3b chronic kidney disease    8. SVT (supraventricular tachycardia) ([facility])        Rajesh De Luna continues to have probable class II symptoms related to D1 aortic stenosis though he is stable and is currently resistant to having aortic valve replacement feeling that it is not the right time for his body.  I understand his hesitation and did caution him about the potential course.  I encouraged him to contact me should there be any progression of the symptoms.  He will continue the present medications.    Follow up: 3 months    History: Rajesh De Luna is a 78 y.o. male with history of oral cancer treated with definitive radiation, persistent A-fib, CKD, chronic heart failure with reduced ejection fraction-LVEF recovered from a cande of 35% as well as coronary artery disease with prior circumflex PCI presenting for follow-up of aortic stenosis-determined to be severe by gradient on catheterization; though a calcium score of the valve was low.    He continues to walk to and from 711 without significant symptoms-just takes it at a slower pace.  He feels better than he did a year ago.  No swelling.  No syncope      Physical Exam:  /60 (BP Location: Left arm, Patient Position: Sitting, BP Cuff Size: Adult)   Pulse 65   Resp 18   Ht 1.778 m (5' 10\")   Wt 83.9 kg (185 lb)   SpO2 96%   BMI 26.54 kg/m²   GEN: NAD  CARDIAC: Regular Systolic ejection murmur late peaking Diminished S2 Diminished and delayed carotid upstrokes  RESP: Clear to auscultation bilaterally  ABD: Soft, non-tender, non-distended  EXT: No edema  NEURO: No focal deficit    Today's encounter addressed an illness with threat to " life/bodily function symptomatic severe aortic stenosis  () Today's E/M visit is associated with medical care services that serve as the continuing focal point for all needed health care services and/or with medical care services that  are part of ongoing care related to a patient's single, serious condition, or a complex condition: This includes  furnishing services to patients on an ongoing basis that result in care that is personalized  to the patient. The services result in a comprehensive, longitudinal, and continuous  relationship with the patient and involve delivery of team-based care that is accessible, coordinated with other practitioners and providers, and integrated with the broader health  care landscape.     Today's ECG shows sinus rhythm, PACs    The ASCVD Risk score (Rula DK, et al., 2019) failed to calculate.    Studies  Lab Results   Component Value Date/Time    CHOLSTRLTOT 108 06/13/2021 02:35 AM    LDL 53 06/13/2021 02:35 AM    HDL 40 06/13/2021 02:35 AM    TRIGLYCERIDE 75 06/13/2021 02:35 AM       Lab Results   Component Value Date/Time    SODIUM 143 09/25/2023 03:29 PM    SODIUM 141 05/15/2023 03:09 PM    POTASSIUM 3.9 09/25/2023 03:29 PM    POTASSIUM 4.3 05/15/2023 03:09 PM    CHLORIDE 108 09/25/2023 03:29 PM    CHLORIDE 105 05/15/2023 03:09 PM    CO2 27.0 09/25/2023 03:29 PM    CO2 23 05/15/2023 03:09 PM    GLUCOSE 108 09/25/2023 03:29 PM    GLUCOSE 107 (H) 05/15/2023 03:09 PM    BUN 16.0 09/25/2023 03:29 PM    BUN 22 05/15/2023 03:09 PM    CREATININE 1.2 09/25/2023 03:29 PM    CREATININE 1.32 05/15/2023 03:09 PM    BUNCREATRAT 13.3 09/25/2023 03:29 PM     Lab Results   Component Value Date/Time    ALKPHOSPHAT 133 (H) 09/25/2023 03:29 PM    ALKPHOSPHAT 123 (H) 05/15/2023 03:09 PM    ASTSGOT 16 09/25/2023 03:29 PM    ASTSGOT 16 05/15/2023 03:09 PM    ALTSGPT 10 09/25/2023 03:29 PM    ALTSGPT 11 05/15/2023 03:09 PM    TBILIRUBIN 0.7 09/25/2023 03:29 PM    TBILIRUBIN 0.6 05/15/2023 03:09  "PM      No results found for: \"HGB\"     Past Medical History:   Diagnosis Date    A-fib (HCC)     Anxiety     Atrial flutter (HCC)     Breath shortness     with exertion    Cancer (HCC) 2018    Lip cancer    Dental disorder     no teeth    Depression     Heart burn     High cholesterol     History of chronic cough     Hypertension     Stage 3b chronic kidney disease (CKD)     SVT (supraventricular tachycardia) (HCC)     Urinary incontinence      Allergies   Allergen Reactions    Penicillins Hives            Outpatient Encounter Medications as of 4/10/2024   Medication Sig Dispense Refill    amiodarone (CORDARONE) 200 MG Tab Take 1 Tablet by mouth every day. 90 Tablet 1    furosemide (LASIX) 20 MG Tab Take 1 Tablet by mouth 1 time a day as needed. Take as needed for weight gain greater than 3 pounds in 1 day or 5 pounds in 1 week. 90 Tablet 3    atorvastatin (LIPITOR) 40 MG Tab Take 1 tablet by mouth every day. 90 Tablet 1    apixaban (ELIQUIS) 5mg Tab Take 1 tablet by mouth 2 times a day. 180 Tablet 3    metoprolol SR (TOPROL XL) 50 MG TABLET SR 24 HR Take 3 Tablets by mouth every day. 180 Tablet 5    amiodarone (CORDARONE) 200 MG Tab Take 1 Tablet by mouth every day. 90 Tablet 1    spironolactone (ALDACTONE) 25 MG Tab Take 1 Tablet by mouth every day. 100 Tablet 3    [DISCONTINUED] clindamycin (CLEOCIN) 150 MG Cap Take 2 capsules (300 mg total) by mouth 3 (three) times a day for 10 days (Patient not taking: Reported on 4/10/2024) 60 Capsule 0     No facility-administered encounter medications on file as of 4/10/2024.     Social History     Socioeconomic History    Marital status: Single     Spouse name: Not on file    Number of children: Not on file    Years of education: Not on file    Highest education level: Not on file   Occupational History    Not on file   Tobacco Use    Smoking status: Some Days     Types: Cigars    Smokeless tobacco: Never    Tobacco comments:     smoke after dinner   Vaping Use    Vaping " Use: Never used   Substance and Sexual Activity    Alcohol use: Yes     Alcohol/week: 8.4 oz     Types: 14 Cans of beer per week     Comment: reports 2/day    Drug use: Not Currently    Sexual activity: Not on file   Other Topics Concern    Not on file   Social History Narrative    Not on file     Social Determinants of Health     Financial Resource Strain: Not on file   Food Insecurity: Not on file   Transportation Needs: Not on file   Physical Activity: Not on file   Stress: Not on file   Social Connections: Not on file   Intimate Partner Violence: Not on file   Housing Stability: Not on file         ROS:   10 point review systems is otherwise negative except as per the HPI    Chief Complaint   Patient presents with    Other     Nonrheumatic aortic valve stenosis

## 2024-05-21 DIAGNOSIS — I25.10 CORONARY ARTERY DISEASE DUE TO LIPID RICH PLAQUE: ICD-10-CM

## 2024-05-21 DIAGNOSIS — I25.83 CORONARY ARTERY DISEASE DUE TO LIPID RICH PLAQUE: ICD-10-CM

## 2024-06-07 ENCOUNTER — PHARMACY VISIT (OUTPATIENT)
Dept: PHARMACY | Facility: MEDICAL CENTER | Age: 79
End: 2024-06-07
Payer: COMMERCIAL

## 2024-06-07 PROCEDURE — RXMED WILLOW AMBULATORY MEDICATION CHARGE: Performed by: INTERNAL MEDICINE

## 2024-06-19 ENCOUNTER — OFFICE VISIT (OUTPATIENT)
Dept: CARDIOLOGY | Facility: MEDICAL CENTER | Age: 79
End: 2024-06-19
Payer: MEDICARE

## 2024-06-19 VITALS
OXYGEN SATURATION: 95 % | BODY MASS INDEX: 26.48 KG/M2 | SYSTOLIC BLOOD PRESSURE: 100 MMHG | WEIGHT: 185 LBS | HEIGHT: 70 IN | DIASTOLIC BLOOD PRESSURE: 58 MMHG | HEART RATE: 85 BPM | RESPIRATION RATE: 14 BRPM

## 2024-06-19 DIAGNOSIS — I48.19 PERSISTENT ATRIAL FIBRILLATION (HCC): ICD-10-CM

## 2024-06-19 DIAGNOSIS — I10 HYPERTENSION, UNSPECIFIED TYPE: ICD-10-CM

## 2024-06-19 DIAGNOSIS — I25.10 CORONARY ARTERY DISEASE DUE TO LIPID RICH PLAQUE: ICD-10-CM

## 2024-06-19 DIAGNOSIS — D68.69 HYPERCOAGULABLE STATE DUE TO PAROXYSMAL ATRIAL FIBRILLATION (HCC): ICD-10-CM

## 2024-06-19 DIAGNOSIS — I48.20 CHRONIC ATRIAL FIBRILLATION (HCC): ICD-10-CM

## 2024-06-19 DIAGNOSIS — I71.21 ANEURYSM OF ASCENDING AORTA WITHOUT RUPTURE (HCC): ICD-10-CM

## 2024-06-19 DIAGNOSIS — I10 PRIMARY HYPERTENSION: ICD-10-CM

## 2024-06-19 DIAGNOSIS — I48.0 HYPERCOAGULABLE STATE DUE TO PAROXYSMAL ATRIAL FIBRILLATION (HCC): ICD-10-CM

## 2024-06-19 DIAGNOSIS — Z79.899 HIGH RISK MEDICATION USE: ICD-10-CM

## 2024-06-19 DIAGNOSIS — I35.0 NONRHEUMATIC AORTIC VALVE STENOSIS: ICD-10-CM

## 2024-06-19 DIAGNOSIS — I48.3 TYPICAL ATRIAL FLUTTER (HCC): ICD-10-CM

## 2024-06-19 DIAGNOSIS — I25.83 CORONARY ARTERY DISEASE DUE TO LIPID RICH PLAQUE: ICD-10-CM

## 2024-06-19 DIAGNOSIS — I50.20 HFREF (HEART FAILURE WITH REDUCED EJECTION FRACTION) (HCC): ICD-10-CM

## 2024-06-19 DIAGNOSIS — I70.0 ATHEROSCLEROSIS OF AORTA (HCC): ICD-10-CM

## 2024-06-19 DIAGNOSIS — E78.5 HYPERLIPIDEMIA, UNSPECIFIED HYPERLIPIDEMIA TYPE: ICD-10-CM

## 2024-06-19 PROCEDURE — 99212 OFFICE O/P EST SF 10 MIN: CPT

## 2024-06-19 PROCEDURE — 3078F DIAST BP <80 MM HG: CPT

## 2024-06-19 PROCEDURE — 3074F SYST BP LT 130 MM HG: CPT

## 2024-06-19 PROCEDURE — 99214 OFFICE O/P EST MOD 30 MIN: CPT

## 2024-06-19 RX ORDER — AMIODARONE HYDROCHLORIDE 200 MG/1
200 TABLET ORAL DAILY
Qty: 90 TABLET | Refills: 3 | Status: SHIPPED | OUTPATIENT
Start: 2024-06-19

## 2024-06-19 RX ORDER — ATORVASTATIN CALCIUM 40 MG/1
40 TABLET, FILM COATED ORAL DAILY
Qty: 90 TABLET | Refills: 3 | Status: SHIPPED | OUTPATIENT
Start: 2024-06-19

## 2024-06-19 ASSESSMENT — ENCOUNTER SYMPTOMS
MUSCULOSKELETAL NEGATIVE: 1
SHORTNESS OF BREATH: 1
NERVOUS/ANXIOUS: 0
GASTROINTESTINAL NEGATIVE: 1
DEPRESSION: 0
EYES NEGATIVE: 1
PND: 0
ORTHOPNEA: 0
PALPITATIONS: 0
NEUROLOGICAL NEGATIVE: 1

## 2024-06-19 ASSESSMENT — FIBROSIS 4 INDEX: FIB4 SCORE: 1.65

## 2024-06-19 NOTE — PROGRESS NOTES
Chief Complaint   Patient presents with    Supraventricular Tachycardia (SVT)     F/V Dx: SVT (supraventricular tachycardia) (HCC)      Hypertension       Subjective     Rajesh De Luna is a 78 y.o. male who presents today for follow-up of his aortic stenosis.  He also has history of oral cancer treated with definitive radiation, persistent A-fib, CKD, heart failure recovered ejection fraction, CAD status post DONOVAN to LAD in 2021    He was seen by Dr. Nunes on 4/10/2024, at that visit patient was resistant to having aortic valve replacement, he was recommended to keep a close eye on his symptoms and follow-up in 3 months.    Today 6/19/2024 he reports that he has not been sleeping well. He has fatigue which he believes to be a result of this. He feels short of breath with quick movements. He reports being able to walk 200-300 yards before becoming short of breath. Can climb a set of stairs but needs to stop and rest.    Past Medical History:   Diagnosis Date    A-fib (HCC)     Anxiety     Atrial flutter (HCC)     Breath shortness     with exertion    Cancer (HCC) 2018    Lip cancer    Dental disorder     no teeth    Depression     Heart burn     High cholesterol     History of chronic cough     Hypertension     Stage 3b chronic kidney disease (CKD)     SVT (supraventricular tachycardia) (HCC)     Urinary incontinence      Past Surgical History:   Procedure Laterality Date    OTHER SURGICAL PROCEDURE  02/2018    dental extractions and cancer from lip removed, followed by radiation    CARDIAC CATH, LEFT HEART       No family history on file.  Social History     Socioeconomic History    Marital status: Single     Spouse name: Not on file    Number of children: Not on file    Years of education: Not on file    Highest education level: Not on file   Occupational History    Not on file   Tobacco Use    Smoking status: Some Days     Types: Cigars    Smokeless tobacco: Never    Tobacco comments:     smoke after dinner    Vaping Use    Vaping status: Never Used   Substance and Sexual Activity    Alcohol use: Yes     Alcohol/week: 8.4 oz     Types: 14 Cans of beer per week     Comment: reports 2/day    Drug use: Not Currently    Sexual activity: Not on file   Other Topics Concern    Not on file   Social History Narrative    Not on file     Social Determinants of Health     Financial Resource Strain: Not on file   Food Insecurity: Not on file   Transportation Needs: Not on file   Physical Activity: Not on file   Stress: Not on file   Social Connections: Not on file   Intimate Partner Violence: Not on file   Housing Stability: Not on file     Allergies   Allergen Reactions    Penicillins Hives            Outpatient Encounter Medications as of 6/19/2024   Medication Sig Dispense Refill    atorvastatin (LIPITOR) 40 MG Tab Take 1 tablet by mouth every day. 90 Tablet 1    apixaban (ELIQUIS) 5mg Tab Take 1 tablet by mouth 2 times a day. 180 Tablet 3    metoprolol SR (TOPROL XL) 50 MG TABLET SR 24 HR Take 3 Tablets by mouth every day. 180 Tablet 5    amiodarone (CORDARONE) 200 MG Tab Take 1 Tablet by mouth every day. (Patient not taking: Reported on 6/19/2024) 90 Tablet 1    spironolactone (ALDACTONE) 25 MG Tab Take 1 Tablet by mouth every day. (Patient not taking: Reported on 6/19/2024) 100 Tablet 3    furosemide (LASIX) 20 MG Tab Take 1 Tablet by mouth 1 time a day as needed. Take as needed for weight gain greater than 3 pounds in 1 day or 5 pounds in 1 week. (Patient not taking: Reported on 6/19/2024) 90 Tablet 3    amiodarone (CORDARONE) 200 MG Tab Take 1 Tablet by mouth every day. (Patient not taking: Reported on 6/19/2024) 90 Tablet 1     No facility-administered encounter medications on file as of 6/19/2024.     Review of Systems   Constitutional:  Positive for malaise/fatigue.   HENT: Negative.     Eyes: Negative.    Respiratory:  Positive for shortness of breath.    Cardiovascular:  Negative for chest pain, palpitations, orthopnea,  "leg swelling and PND.   Gastrointestinal: Negative.    Genitourinary: Negative.    Musculoskeletal: Negative.    Skin: Negative.    Neurological: Negative.    Endo/Heme/Allergies: Negative.    Psychiatric/Behavioral:  Negative for depression. The patient is not nervous/anxious.               Objective     /58 (BP Location: Left arm, Patient Position: Sitting, BP Cuff Size: Adult)   Pulse 85   Resp 14   Ht 1.778 m (5' 10\")   Wt 83.9 kg (185 lb)   SpO2 95%   BMI 26.54 kg/m²     Physical Exam  Constitutional:       Appearance: Normal appearance.   HENT:      Head: Normocephalic.   Neck:      Vascular: No JVD.   Cardiovascular:      Rate and Rhythm: Normal rate and regular rhythm.      Pulses: Normal pulses.      Heart sounds: Murmur heard.      Systolic murmur is present with a grade of 4/6.      No friction rub.   Pulmonary:      Effort: Pulmonary effort is normal.      Breath sounds: Normal breath sounds.   Abdominal:      Palpations: Abdomen is soft.   Musculoskeletal:         General: Normal range of motion.      Right lower leg: No edema.      Left lower leg: No edema.   Skin:     General: Skin is warm and dry.   Neurological:      General: No focal deficit present.      Mental Status: He is alert and oriented to person, place, and time.   Psychiatric:         Mood and Affect: Mood normal.         Behavior: Behavior normal.            Lab Results   Component Value Date/Time    CHOLSTRLTOT 108 06/13/2021 02:35 AM    LDL 53 06/13/2021 02:35 AM    HDL 40 06/13/2021 02:35 AM    TRIGLYCERIDE 75 06/13/2021 02:35 AM       Lab Results   Component Value Date/Time    SODIUM 143 09/25/2023 03:29 PM    SODIUM 141 05/15/2023 03:09 PM    POTASSIUM 3.9 09/25/2023 03:29 PM    POTASSIUM 4.3 05/15/2023 03:09 PM    CHLORIDE 108 09/25/2023 03:29 PM    CHLORIDE 105 05/15/2023 03:09 PM    CO2 27.0 09/25/2023 03:29 PM    CO2 23 05/15/2023 03:09 PM    GLUCOSE 108 09/25/2023 03:29 PM    GLUCOSE 107 (H) 05/15/2023 03:09 PM    " BUN 16.0 09/25/2023 03:29 PM    BUN 22 05/15/2023 03:09 PM    CREATININE 1.2 09/25/2023 03:29 PM    CREATININE 1.32 05/15/2023 03:09 PM    BUNCREATRAT 13.3 09/25/2023 03:29 PM     Lab Results   Component Value Date/Time    ALKPHOSPHAT 133 (H) 09/25/2023 03:29 PM    ALKPHOSPHAT 123 (H) 05/15/2023 03:09 PM    ASTSGOT 16 09/25/2023 03:29 PM    ASTSGOT 16 05/15/2023 03:09 PM    ALTSGPT 10 09/25/2023 03:29 PM    ALTSGPT 11 05/15/2023 03:09 PM    TBILIRUBIN 0.7 09/25/2023 03:29 PM    TBILIRUBIN 0.6 05/15/2023 03:09 PM      Echocardiogram 12/19/2023  CONCLUSIONS  The left ventricular ejection fraction is visually estimated to be 55%.  Mild mitral regurgitation.  Moderate aortic valve stenosis.  Transvalvular gradients are - Peak: 42 mmHg, Mean: 30 mmHg.  Mild aortic insufficiency.  The ascending aorta is dilated with a diameter of 4.4 cm.    Angiogram 5/19/2023  IMPRESSIONS:  1. Severe aortic stenosis (D1)  2. Moderate LAD/RCA CAD with patent circumflex stent  3. Normal right heart hemodynamics, normal CO     Recommendations:  AVR assessment     Pre-procedure diagnosis: Severe aortic stenosis  Post-procedure diagnosis: Same     Findings   Hemodynamics:   Aorta: 115/66 mmHg  LVEDP: 23 mmHg  RA: 7 mmHg  RV: 27/10 mmHg  PA: 25/10/16 mmHg  PCWP: 10 mmHg  Cardiac Output/Index (thermodilution): 5.4 L/min, 2.7 L/min/m2  Cardiac Output/Index (Frida): 3.9 L/min, 1.9 L/min/m2  ABRAHAM: 0.8 mm2  Aortic valve mean gradient: 41 mmHg     Coronary Anatomy              Left Main:  10% distal stenosis              LAD:  40% proximal and 60% mid stenosis- unchanged from 2021              LCx:  30% proximal stenosis. Patent mid stent. OM1 is small and normal. OM2 is moderate and has <30% stenosis.               RCA: Dominant, 60% proximal stenosis. The PDA has 50% mid stenosis. The PLB has MLI    Assessment & Plan     1. Nonrheumatic aortic valve stenosis        2. Atherosclerosis of aorta (HCC)        3. Typical atrial flutter (HCC)        4.  HFrEF        5. Hypercoagulable state due to paroxysmal atrial fibrillation (HCC)        6. Hyperlipidemia, unspecified hyperlipidemia type        7. Primary hypertension        8. Aneurysm of ascending aorta without rupture (HCC)        9. Coronary artery disease due to lipid rich plaque            Medical Decision Making: Today's Assessment/Status/Plan:        Severe aortic stenosis and ascending aortic aneurysm  -Ascending aorta 4.4 cm on echocardiogram and TAVR CT  - he is not yet ready to go through with TAVR  - we discussed the potential repercussions of delaying treatment leading that severe aortic stenosis typically carries a 2-year mortality rate    Atrial fibrillation  -Continue amiodarone and Eliquis 5 mg twice daily, metoprolol 150 mg daily  --Amiodarone risks have been discussed in detail including: QT prolongation and torsades de pointes, conduction system impairment, photosensitivity, blue-gray skin discoloration, hyperthyroidism, hypothyroidism, AST or ALT elevation, corneal micro deposits, optic neuropathy, and pulmonary toxicity.  -Recommended annual surveillance includes ECG, skin examination, TSH with reflex ordered, AST and ALT ordered, yearly eye exam, and chest x-ray    Heart failure with recovered ejection fraction, NYHA class II, stage C, EF 55%  -Continue metoprolol, spironolactone 25 mg daily, Lasix 20 mg as needed    Coronary Artery Disease  Status post DONOVAN to left circumflex in 2021  - continue medications as above, atorvastatin 40 mg daily  We addressed the management of atherosclerotic artery disease.  He is on proper antiplatelet, cholesterol management and beta-blockers as appropriate.  We addressed the potential side effects and laboratory follow-up for these medications.    Hypertension  - good control  - continue meds as above  - goal < 130/80     Hyperlipidemia  -Most recent LDL 53  -Continue atorvastatin 40 mg daily  -Goal of less than 70  -Check lipid panel     Follow-up with  Mar BINGHAM in 3 months, sooner if symptoms worsen    This note was dictated using Dragon speech recognition software.

## 2024-07-18 ENCOUNTER — PHARMACY VISIT (OUTPATIENT)
Dept: PHARMACY | Facility: MEDICAL CENTER | Age: 79
End: 2024-07-18
Payer: COMMERCIAL

## 2024-07-18 PROCEDURE — RXMED WILLOW AMBULATORY MEDICATION CHARGE: Performed by: INTERNAL MEDICINE

## 2024-07-18 PROCEDURE — RXOTC WILLOW AMBULATORY OTC CHARGE: Performed by: PHARMACIST

## 2024-09-17 ENCOUNTER — TELEPHONE (OUTPATIENT)
Dept: CARDIOLOGY | Facility: MEDICAL CENTER | Age: 79
End: 2024-09-17
Payer: MEDICARE

## 2024-09-17 ENCOUNTER — PHARMACY VISIT (OUTPATIENT)
Dept: PHARMACY | Facility: MEDICAL CENTER | Age: 79
End: 2024-09-17
Payer: COMMERCIAL

## 2024-09-17 PROCEDURE — RXMED WILLOW AMBULATORY MEDICATION CHARGE

## 2024-09-17 NOTE — TELEPHONE ENCOUNTER
Pt needs prescription filled for Eliquis, he is completely out.     He needs prescription renewed and sent to [facility].    Please f/v up with pt [phone], ok to leaver message.

## 2024-09-17 NOTE — TELEPHONE ENCOUNTER
Phone Number Called: [phone] not in service    Call outcome: Left detailed message for patient. Informed to call back with any additional questions.    Message: LVM advising that [facility] as the active prescription for eliquis.

## 2024-09-19 ENCOUNTER — OFFICE VISIT (OUTPATIENT)
Dept: CARDIOLOGY | Facility: MEDICAL CENTER | Age: 79
End: 2024-09-19
Payer: MEDICARE

## 2024-09-19 VITALS
RESPIRATION RATE: 18 BRPM | OXYGEN SATURATION: 96 % | BODY MASS INDEX: 26.05 KG/M2 | WEIGHT: 182 LBS | SYSTOLIC BLOOD PRESSURE: 104 MMHG | DIASTOLIC BLOOD PRESSURE: 64 MMHG | HEIGHT: 70 IN | HEART RATE: 92 BPM

## 2024-09-19 DIAGNOSIS — I48.19 PERSISTENT ATRIAL FIBRILLATION (HCC): ICD-10-CM

## 2024-09-19 DIAGNOSIS — I47.10 SVT (SUPRAVENTRICULAR TACHYCARDIA) (HCC): ICD-10-CM

## 2024-09-19 DIAGNOSIS — I48.3 TYPICAL ATRIAL FLUTTER (HCC): ICD-10-CM

## 2024-09-19 DIAGNOSIS — I50.20 HFREF (HEART FAILURE WITH REDUCED EJECTION FRACTION) (HCC): ICD-10-CM

## 2024-09-19 DIAGNOSIS — R35.0 URINARY FREQUENCY: ICD-10-CM

## 2024-09-19 DIAGNOSIS — D68.69 HYPERCOAGULABLE STATE DUE TO PAROXYSMAL ATRIAL FIBRILLATION (HCC): ICD-10-CM

## 2024-09-19 DIAGNOSIS — I48.0 HYPERCOAGULABLE STATE DUE TO PAROXYSMAL ATRIAL FIBRILLATION (HCC): ICD-10-CM

## 2024-09-19 DIAGNOSIS — I10 PRIMARY HYPERTENSION: ICD-10-CM

## 2024-09-19 DIAGNOSIS — E78.5 HYPERLIPIDEMIA, UNSPECIFIED HYPERLIPIDEMIA TYPE: ICD-10-CM

## 2024-09-19 DIAGNOSIS — I71.21 ANEURYSM OF ASCENDING AORTA WITHOUT RUPTURE (HCC): ICD-10-CM

## 2024-09-19 DIAGNOSIS — I70.0 ATHEROSCLEROSIS OF AORTA (HCC): ICD-10-CM

## 2024-09-19 DIAGNOSIS — I50.9 HEART FAILURE, NYHA CLASS 3 (HCC): ICD-10-CM

## 2024-09-19 DIAGNOSIS — I48.20 CHRONIC ATRIAL FIBRILLATION (HCC): ICD-10-CM

## 2024-09-19 DIAGNOSIS — I35.0 NONRHEUMATIC AORTIC VALVE STENOSIS: ICD-10-CM

## 2024-09-19 DIAGNOSIS — I10 HYPERTENSION, UNSPECIFIED TYPE: ICD-10-CM

## 2024-09-19 PROCEDURE — 3074F SYST BP LT 130 MM HG: CPT

## 2024-09-19 PROCEDURE — 99214 OFFICE O/P EST MOD 30 MIN: CPT

## 2024-09-19 PROCEDURE — 3078F DIAST BP <80 MM HG: CPT

## 2024-09-19 PROCEDURE — RXMED WILLOW AMBULATORY MEDICATION CHARGE

## 2024-09-19 RX ORDER — SPIRONOLACTONE 25 MG/1
25 TABLET ORAL DAILY
Qty: 100 TABLET | Refills: 3 | Status: SHIPPED | OUTPATIENT
Start: 2024-09-19

## 2024-09-19 RX ORDER — ATORVASTATIN CALCIUM 40 MG/1
40 TABLET, FILM COATED ORAL DAILY
Qty: 100 TABLET | Refills: 3 | Status: SHIPPED | OUTPATIENT
Start: 2024-09-19

## 2024-09-19 RX ORDER — AMIODARONE HYDROCHLORIDE 200 MG/1
200 TABLET ORAL DAILY
Qty: 100 TABLET | Refills: 3 | Status: SHIPPED | OUTPATIENT
Start: 2024-09-19

## 2024-09-19 RX ORDER — METOPROLOL SUCCINATE 50 MG/1
150 TABLET, EXTENDED RELEASE ORAL DAILY
Qty: 180 TABLET | Refills: 5 | Status: SHIPPED | OUTPATIENT
Start: 2024-09-19

## 2024-09-19 ASSESSMENT — ENCOUNTER SYMPTOMS
ORTHOPNEA: 0
MUSCULOSKELETAL NEGATIVE: 1
PND: 0
SHORTNESS OF BREATH: 1
DIZZINESS: 1
PALPITATIONS: 0
GASTROINTESTINAL NEGATIVE: 1
EYES NEGATIVE: 1
NERVOUS/ANXIOUS: 0
DEPRESSION: 0

## 2024-09-19 ASSESSMENT — FIBROSIS 4 INDEX: FIB4 SCORE: 1.67

## 2024-09-19 NOTE — PROGRESS NOTES
Chief Complaint   Patient presents with    Aortic Stenosis    Hyperlipidemia    Supraventricular Tachycardia (SVT)       Subjective     Rajesh De Luna is a 79 y.o. male who presents today for follow-up.  He has history of aortic stenosis, oral cancer treated with definitive radiation, persistent A-fib, CKD, heart failure recovered ejection fraction, CAD status post DONOVAN to LAD in 2021     He was seen by Dr. Nunes on 4/10/2024, at that visit patient was resistant to having aortic valve replacement, he was recommended to keep a close eye on his symptoms and follow-up in 3 months.     Seen by myself on 6/19/2024 he reports that he has not been sleeping well. He has fatigue which he believes to be a result of this. He feels short of breath with quick movements. He reports being able to walk 200-300 yards before becoming short of breath. Can climb a set of stairs but needs to stop and rest.    Today 9/19/2024 he is able to walk about a mile at a time, he does have YING, not able to climb set of stairs. NYHA class II. He does have fatigue and takes a daily naps. Occasional swelling in legs. Occasional near syncope, no LOC    Past Medical History:   Diagnosis Date    A-fib (HCC)     Anxiety     Atrial flutter (HCC)     Breath shortness     with exertion    Cancer (HCC) 2018    Lip cancer    Dental disorder     no teeth    Depression     Heart burn     High cholesterol     History of chronic cough     Hypertension     Stage 3b chronic kidney disease (CKD)     SVT (supraventricular tachycardia) (HCC)     Urinary incontinence      Past Surgical History:   Procedure Laterality Date    OTHER SURGICAL PROCEDURE  02/2018    dental extractions and cancer from lip removed, followed by radiation    CARDIAC CATH, LEFT HEART       History reviewed. No pertinent family history.  Social History     Socioeconomic History    Marital status: Single     Spouse name: Not on file    Number of children: Not on file    Years of education:  Not on file    Highest education level: Not on file   Occupational History    Not on file   Tobacco Use    Smoking status: Some Days     Types: Cigars    Smokeless tobacco: Never    Tobacco comments:     smoke after dinner   Vaping Use    Vaping status: Never Used   Substance and Sexual Activity    Alcohol use: Yes     Alcohol/week: 8.4 oz     Types: 14 Cans of beer per week     Comment: reports 2/day    Drug use: Not Currently    Sexual activity: Not on file   Other Topics Concern    Not on file   Social History Narrative    Not on file     Social Determinants of Health     Financial Resource Strain: Not on file   Food Insecurity: Not on file   Transportation Needs: Not on file   Physical Activity: Not on file   Stress: Not on file   Social Connections: Not on file   Intimate Partner Violence: Not on file   Housing Stability: Not on file     Allergies   Allergen Reactions    Penicillins Hives            Outpatient Encounter Medications as of 9/19/2024   Medication Sig Dispense Refill    amiodarone (CORDARONE) 200 MG Tab Take 1 Tablet by mouth every day. 90 Tablet 3    apixaban (ELIQUIS) 5mg Tab Take 1 tablet by mouth 2 times a day. 180 Tablet 3    atorvastatin (LIPITOR) 40 MG Tab Take 1 tablet by mouth every day. 90 Tablet 3    metoprolol SR (TOPROL XL) 50 MG TABLET SR 24 HR Take 3 Tablets by mouth every day. 180 Tablet 5    spironolactone (ALDACTONE) 25 MG Tab Take 1 Tablet by mouth every day. (Patient not taking: Reported on 6/19/2024) 100 Tablet 3    furosemide (LASIX) 20 MG Tab Take 1 Tablet by mouth 1 time a day as needed. Take as needed for weight gain greater than 3 pounds in 1 day or 5 pounds in 1 week. (Patient not taking: Reported on 6/19/2024) 90 Tablet 3     No facility-administered encounter medications on file as of 9/19/2024.     Review of Systems   Constitutional:  Positive for malaise/fatigue.   HENT: Negative.     Eyes: Negative.    Respiratory:  Positive for shortness of breath.   "  Cardiovascular:  Positive for leg swelling. Negative for chest pain, palpitations, orthopnea and PND.   Gastrointestinal: Negative.    Genitourinary: Negative.    Musculoskeletal: Negative.    Skin: Negative.    Neurological:  Positive for dizziness.   Endo/Heme/Allergies: Negative.    Psychiatric/Behavioral:  Negative for depression. The patient is not nervous/anxious.               Objective     /64 (BP Location: Left arm, Patient Position: Sitting, BP Cuff Size: Adult)   Pulse 92   Resp 18   Ht 1.778 m (5' 10\")   Wt 82.6 kg (182 lb)   SpO2 96%   BMI 26.11 kg/m²     Physical Exam  Constitutional:       Appearance: Normal appearance.   HENT:      Head: Normocephalic.   Neck:      Vascular: No JVD.   Cardiovascular:      Rate and Rhythm: Normal rate and regular rhythm.      Pulses: Normal pulses.      Heart sounds: Murmur heard.      Systolic murmur is present with a grade of 4/6.      No friction rub.   Pulmonary:      Effort: Pulmonary effort is normal.      Breath sounds: Normal breath sounds.   Abdominal:      Palpations: Abdomen is soft.   Musculoskeletal:         General: Normal range of motion.      Right lower leg: No edema.      Left lower leg: No edema.   Skin:     General: Skin is warm and dry.   Neurological:      General: No focal deficit present.      Mental Status: He is alert and oriented to person, place, and time.   Psychiatric:         Mood and Affect: Mood normal.         Behavior: Behavior normal.            Lab Results   Component Value Date/Time    CHOLSTRLTOT 108 06/13/2021 02:35 AM    LDL 53 06/13/2021 02:35 AM    HDL 40 06/13/2021 02:35 AM    TRIGLYCERIDE 75 06/13/2021 02:35 AM       Lab Results   Component Value Date/Time    SODIUM 143 09/25/2023 03:29 PM    SODIUM 141 05/15/2023 03:09 PM    POTASSIUM 3.9 09/25/2023 03:29 PM    POTASSIUM 4.3 05/15/2023 03:09 PM    CHLORIDE 108 09/25/2023 03:29 PM    CHLORIDE 105 05/15/2023 03:09 PM    CO2 27.0 09/25/2023 03:29 PM    CO2 23 " 05/15/2023 03:09 PM    GLUCOSE 108 09/25/2023 03:29 PM    GLUCOSE 107 (H) 05/15/2023 03:09 PM    BUN 16.0 09/25/2023 03:29 PM    BUN 22 05/15/2023 03:09 PM    CREATININE 1.2 09/25/2023 03:29 PM    CREATININE 1.32 05/15/2023 03:09 PM    BUNCREATRAT 13.3 09/25/2023 03:29 PM     Lab Results   Component Value Date/Time    ALKPHOSPHAT 133 (H) 09/25/2023 03:29 PM    ALKPHOSPHAT 123 (H) 05/15/2023 03:09 PM    ASTSGOT 16 09/25/2023 03:29 PM    ASTSGOT 16 05/15/2023 03:09 PM    ALTSGPT 10 09/25/2023 03:29 PM    ALTSGPT 11 05/15/2023 03:09 PM    TBILIRUBIN 0.7 09/25/2023 03:29 PM    TBILIRUBIN 0.6 05/15/2023 03:09 PM      Lab Results   Component Value Date/Time    WBC 8.30 09/25/2023 03:29 PM    WBC 7.1 05/15/2023 03:09 PM    RBC 3.85 (L) 09/25/2023 03:29 PM    RBC 4.58 (L) 05/15/2023 03:09 PM    HEMOGLOBIN 12.7 (L) 09/25/2023 03:29 PM    HEMOGLOBIN 14.9 05/15/2023 03:09 PM    HEMATOCRIT 37.6 (L) 09/25/2023 03:29 PM    HEMATOCRIT 45.4 05/15/2023 03:09 PM    MCV 97.7 (H) 09/25/2023 03:29 PM    MCV 99.1 (H) 05/15/2023 03:09 PM    MCH 32.9 09/25/2023 03:29 PM    MCH 32.5 05/15/2023 03:09 PM    MCHC 33.6 09/25/2023 03:29 PM    MCHC 32.8 (L) 05/15/2023 03:09 PM    MPV 7.7 09/25/2023 03:29 PM    MPV 9.7 05/15/2023 03:09 PM    NEUTSPOLYS 76.0 09/25/2023 03:29 PM    NEUTSPOLYS 66.90 01/12/2023 10:51 AM    LYMPHOCYTES 11.8 09/25/2023 03:29 PM    LYMPHOCYTES 18.50 (L) 01/12/2023 10:51 AM    MONOCYTES 9.3 09/25/2023 03:29 PM    MONOCYTES 9.30 01/12/2023 10:51 AM    EOSINOPHILS 2.3 09/25/2023 03:29 PM    EOSINOPHILS 3.70 01/12/2023 10:51 AM    BASOPHILS 0.6 09/25/2023 03:29 PM    BASOPHILS 1.00 01/12/2023 10:51 AM      Echocardiogram 12/19/2023  CONCLUSIONS  The left ventricular ejection fraction is visually estimated to be 55%.  Mild mitral regurgitation.  Moderate aortic valve stenosis.  Transvalvular gradients are - Peak: 42 mmHg, Mean: 30 mmHg.  Mild aortic insufficiency.  The ascending aorta is dilated with a diameter of 4.4 cm.      Angiogram 5/19/2023  IMPRESSIONS:  1. Severe aortic stenosis (D1)  2. Moderate LAD/RCA CAD with patent circumflex stent  3. Normal right heart hemodynamics, normal CO     Recommendations:  AVR assessment     Pre-procedure diagnosis: Severe aortic stenosis  Post-procedure diagnosis: Same     Findings   Hemodynamics:   Aorta: 115/66 mmHg  LVEDP: 23 mmHg  RA: 7 mmHg  RV: 27/10 mmHg  PA: 25/10/16 mmHg  PCWP: 10 mmHg  Cardiac Output/Index (thermodilution): 5.4 L/min, 2.7 L/min/m2  Cardiac Output/Index (Frida): 3.9 L/min, 1.9 L/min/m2  ABRAHAM: 0.8 mm2  Aortic valve mean gradient: 41 mmHg     Coronary Anatomy              Left Main:  10% distal stenosis              LAD:  40% proximal and 60% mid stenosis- unchanged from 2021              LCx:  30% proximal stenosis. Patent mid stent. OM1 is small and normal. OM2 is moderate and has <30% stenosis.               RCA: Dominant, 60% proximal stenosis. The PDA has 50% mid stenosis. The PLB has MLI    Assessment & Plan     1. Nonrheumatic aortic valve stenosis        2. Aneurysm of ascending aorta without rupture (HCC)        3. Typical atrial flutter (HCC)        4. HFrEF        5. Hypercoagulable state due to paroxysmal atrial fibrillation (HCC)        6. Hyperlipidemia, unspecified hyperlipidemia type        7. Primary hypertension        8. Atherosclerosis of aorta (HCC)            Medical Decision Making: Today's Assessment/Status/Plan:        Severe aortic stenosis and ascending aortic aneurysm  -Ascending aorta 4.4 cm on echocardiogram and TAVR CT  - he is not yet ready to go through with TAVR  - we discussed the potential repercussions of delaying treatment leading that severe aortic stenosis typically carries a 2-year mortality rate  -Repeat echocardiogram ordered     Atrial fibrillation  -Continue amiodarone and Eliquis 5 mg twice daily, metoprolol 150 mg daily  -Amiodarone risks have been discussed in detail including: QT prolongation and torsades de pointes,  conduction system impairment, photosensitivity, blue-gray skin discoloration, hyperthyroidism, hypothyroidism, AST or ALT elevation, corneal micro deposits, optic neuropathy, and pulmonary toxicity.  -Recommended annual surveillance includes ECG, skin examination, TSH with reflex ordered, AST and ALT ordered, yearly eye exam, and chest x-ray     Heart failure with recovered ejection fraction, NYHA class II, stage C, EF 55%  -Continue metoprolol, spironolactone 25 mg daily, Lasix 20 mg as needed     Coronary Artery Disease  Status post DONOVAN to left circumflex in 2021  - continue medications as above, atorvastatin 40 mg daily  We addressed the management of atherosclerotic artery disease.  He is on proper antiplatelet, cholesterol management and beta-blockers as appropriate.  We addressed the potential side effects and laboratory follow-up for these medications.     Hypertension  - good control  - continue meds as above  - goal < 130/80     Hyperlipidemia with aortic atherosclerosis  -Most recent LDL 53  -Continue atorvastatin 40 mg daily  -Goal of less than 70  -Check lipid panel      Follow-up with Mar BINGHAM in 3 months, sooner if symptoms worsen      This note was dictated using Dragon speech recognition software.

## 2024-09-20 ENCOUNTER — PHARMACY VISIT (OUTPATIENT)
Dept: PHARMACY | Facility: MEDICAL CENTER | Age: 79
End: 2024-09-20
Payer: COMMERCIAL

## 2024-11-12 PROCEDURE — RXMED WILLOW AMBULATORY MEDICATION CHARGE

## 2024-11-13 ENCOUNTER — PHARMACY VISIT (OUTPATIENT)
Dept: PHARMACY | Facility: MEDICAL CENTER | Age: 79
End: 2024-11-13
Payer: COMMERCIAL

## 2024-12-19 ENCOUNTER — OFFICE VISIT (OUTPATIENT)
Dept: CARDIOLOGY | Facility: MEDICAL CENTER | Age: 79
End: 2024-12-19
Payer: MEDICARE

## 2024-12-19 VITALS
BODY MASS INDEX: 25.57 KG/M2 | HEART RATE: 91 BPM | RESPIRATION RATE: 16 BRPM | SYSTOLIC BLOOD PRESSURE: 100 MMHG | OXYGEN SATURATION: 96 % | DIASTOLIC BLOOD PRESSURE: 60 MMHG | HEIGHT: 70 IN | WEIGHT: 178.6 LBS

## 2024-12-19 DIAGNOSIS — I10 PRIMARY HYPERTENSION: ICD-10-CM

## 2024-12-19 DIAGNOSIS — I48.3 TYPICAL ATRIAL FLUTTER (HCC): ICD-10-CM

## 2024-12-19 DIAGNOSIS — I25.10 CORONARY ARTERY DISEASE DUE TO LIPID RICH PLAQUE: ICD-10-CM

## 2024-12-19 DIAGNOSIS — I50.9 HEART FAILURE, NYHA CLASS 3 (HCC): ICD-10-CM

## 2024-12-19 DIAGNOSIS — I70.0 ATHEROSCLEROSIS OF AORTA (HCC): ICD-10-CM

## 2024-12-19 DIAGNOSIS — I10 HYPERTENSION, UNSPECIFIED TYPE: ICD-10-CM

## 2024-12-19 DIAGNOSIS — I71.21 ANEURYSM OF ASCENDING AORTA WITHOUT RUPTURE (HCC): ICD-10-CM

## 2024-12-19 DIAGNOSIS — I48.0 HYPERCOAGULABLE STATE DUE TO PAROXYSMAL ATRIAL FIBRILLATION (HCC): ICD-10-CM

## 2024-12-19 DIAGNOSIS — E78.5 HYPERLIPIDEMIA, UNSPECIFIED HYPERLIPIDEMIA TYPE: ICD-10-CM

## 2024-12-19 DIAGNOSIS — I48.19 PERSISTENT ATRIAL FIBRILLATION (HCC): ICD-10-CM

## 2024-12-19 DIAGNOSIS — I48.20 CHRONIC ATRIAL FIBRILLATION (HCC): ICD-10-CM

## 2024-12-19 DIAGNOSIS — I35.0 NONRHEUMATIC AORTIC VALVE STENOSIS: ICD-10-CM

## 2024-12-19 DIAGNOSIS — I50.20 HFREF (HEART FAILURE WITH REDUCED EJECTION FRACTION) (HCC): ICD-10-CM

## 2024-12-19 DIAGNOSIS — I25.83 CORONARY ARTERY DISEASE DUE TO LIPID RICH PLAQUE: ICD-10-CM

## 2024-12-19 DIAGNOSIS — I47.10 SVT (SUPRAVENTRICULAR TACHYCARDIA) (HCC): ICD-10-CM

## 2024-12-19 DIAGNOSIS — I51.7 LVH (LEFT VENTRICULAR HYPERTROPHY): ICD-10-CM

## 2024-12-19 DIAGNOSIS — D68.69 HYPERCOAGULABLE STATE DUE TO PAROXYSMAL ATRIAL FIBRILLATION (HCC): ICD-10-CM

## 2024-12-19 PROCEDURE — 99212 OFFICE O/P EST SF 10 MIN: CPT

## 2024-12-19 PROCEDURE — 3078F DIAST BP <80 MM HG: CPT

## 2024-12-19 PROCEDURE — 99214 OFFICE O/P EST MOD 30 MIN: CPT

## 2024-12-19 PROCEDURE — 3074F SYST BP LT 130 MM HG: CPT

## 2024-12-19 PROCEDURE — RXMED WILLOW AMBULATORY MEDICATION CHARGE

## 2024-12-19 RX ORDER — SPIRONOLACTONE 25 MG/1
25 TABLET ORAL DAILY
Qty: 100 TABLET | Refills: 3 | Status: SHIPPED | OUTPATIENT
Start: 2024-12-19

## 2024-12-19 RX ORDER — AMIODARONE HYDROCHLORIDE 200 MG/1
200 TABLET ORAL DAILY
Qty: 100 TABLET | Refills: 3 | Status: SHIPPED | OUTPATIENT
Start: 2024-12-19

## 2024-12-19 RX ORDER — ATORVASTATIN CALCIUM 40 MG/1
40 TABLET, FILM COATED ORAL DAILY
Qty: 100 TABLET | Refills: 3 | Status: SHIPPED | OUTPATIENT
Start: 2024-12-19

## 2024-12-19 RX ORDER — METOPROLOL SUCCINATE 25 MG/1
25 TABLET, EXTENDED RELEASE ORAL DAILY
Qty: 100 TABLET | Refills: 3 | Status: SHIPPED | OUTPATIENT
Start: 2024-12-19

## 2024-12-19 ASSESSMENT — FIBROSIS 4 INDEX: FIB4 SCORE: 1.67

## 2024-12-19 ASSESSMENT — ENCOUNTER SYMPTOMS
GASTROINTESTINAL NEGATIVE: 1
PND: 0
NERVOUS/ANXIOUS: 0
DEPRESSION: 0
EYES NEGATIVE: 1
ORTHOPNEA: 0
MUSCULOSKELETAL NEGATIVE: 1
SHORTNESS OF BREATH: 0
NEUROLOGICAL NEGATIVE: 1
PALPITATIONS: 0

## 2024-12-19 NOTE — PROGRESS NOTES
Chief Complaint   Patient presents with    Aortic Stenosis     Nonrheumatic aortic valve stenosis       Subjective     Rajesh De Luna is a 79 y.o. male who presents today for follow-up of his aortic stenosis.  He also has history of oral cancer treated with definitive radiation, persistent A-fib, CKD, heart failure recovered ejection fraction, CAD status post DONOVAN to LAD in 2021     He was seen by Dr. Nunes on 4/10/2024, at that visit patient was resistant to having aortic valve replacement, he was recommended to keep a close eye on his symptoms and follow-up in 3 months.     6/19/2024 he reports that he has not been sleeping well. He has fatigue which he believes to be a result of this. He feels short of breath with quick movements. He reports being able to walk 200-300 yards before becoming short of breath. Can climb a set of stairs but needs to stop and rest.  Ordered labs for him    12/19/2024 he has urinary frequency. Some mild shortness of breath in the morning. Some mild fatigue. No lifestyle limiting symptoms.    Past Medical History:   Diagnosis Date    A-fib (HCC)     Anxiety     Atrial flutter (HCC)     Breath shortness     with exertion    Cancer (HCC) 2018    Lip cancer    Dental disorder     no teeth    Depression     Heart burn     High cholesterol     History of chronic cough     Hypertension     Stage 3b chronic kidney disease (CKD)     SVT (supraventricular tachycardia) (HCC)     Urinary incontinence      Past Surgical History:   Procedure Laterality Date    OTHER SURGICAL PROCEDURE  02/2018    dental extractions and cancer from lip removed, followed by radiation    CARDIAC CATH, LEFT HEART       History reviewed. No pertinent family history.  Social History     Socioeconomic History    Marital status: Single     Spouse name: Not on file    Number of children: Not on file    Years of education: Not on file    Highest education level: Not on file   Occupational History    Not on file   Tobacco  Use    Smoking status: Some Days     Types: Cigars    Smokeless tobacco: Never    Tobacco comments:     smoke after dinner   Vaping Use    Vaping status: Never Used   Substance and Sexual Activity    Alcohol use: Yes     Alcohol/week: 8.4 oz     Types: 14 Cans of beer per week     Comment: reports 2/day    Drug use: Not Currently    Sexual activity: Not on file   Other Topics Concern    Not on file   Social History Narrative    Not on file     Social Drivers of Health     Financial Resource Strain: Not on file   Food Insecurity: Not on file   Transportation Needs: Not on file   Physical Activity: Not on file   Stress: Not on file   Social Connections: Not on file   Intimate Partner Violence: Not on file   Housing Stability: Not on file     Allergies   Allergen Reactions    Penicillins Hives            Outpatient Encounter Medications as of 12/19/2024   Medication Sig Dispense Refill    amiodarone (CORDARONE) 200 MG Tab Take 1 Tablet by mouth every day. 100 Tablet 3    apixaban (ELIQUIS) 5mg Tab Take 1 tablet by mouth 2 times a day. 200 Tablet 3    atorvastatin (LIPITOR) 40 MG Tab Take 1 tablet by mouth every day. (Patient not taking: Reported on 12/19/2024) 100 Tablet 3    metoprolol SR (TOPROL XL) 50 MG TABLET SR 24 HR Take 3 Tablets by mouth every day. (Patient not taking: Reported on 12/19/2024) 180 Tablet 5    spironolactone (ALDACTONE) 25 MG Tab Take 1 Tablet by mouth every day. (Patient not taking: Reported on 12/19/2024) 100 Tablet 3    furosemide (LASIX) 20 MG Tab Take 1 Tablet by mouth 1 time a day as needed. Take as needed for weight gain greater than 3 pounds in 1 day or 5 pounds in 1 week. (Patient not taking: Reported on 12/19/2024) 90 Tablet 3     No facility-administered encounter medications on file as of 12/19/2024.     Review of Systems   Constitutional:  Positive for malaise/fatigue.   HENT: Negative.     Eyes: Negative.    Respiratory:  Negative for shortness of breath.    Cardiovascular:   "Negative for chest pain, palpitations, orthopnea, leg swelling and PND.   Gastrointestinal: Negative.    Genitourinary: Negative.    Musculoskeletal: Negative.    Skin: Negative.    Neurological: Negative.    Endo/Heme/Allergies: Negative.    Psychiatric/Behavioral:  Negative for depression. The patient is not nervous/anxious.               Objective     /60 (BP Location: Left arm, Patient Position: Sitting, BP Cuff Size: Adult)   Pulse 91   Resp 16   Ht 1.778 m (5' 10\")   Wt 81 kg (178 lb 9.6 oz)   SpO2 96%   BMI 25.63 kg/m²     Physical Exam  Constitutional:       Appearance: Normal appearance.   HENT:      Head: Normocephalic.   Neck:      Vascular: No JVD.   Cardiovascular:      Rate and Rhythm: Normal rate and regular rhythm.      Pulses: Normal pulses.      Heart sounds: Murmur heard.      Systolic murmur is present with a grade of 3/6.      No friction rub.   Pulmonary:      Effort: Pulmonary effort is normal.      Breath sounds: Normal breath sounds.   Abdominal:      Palpations: Abdomen is soft.   Musculoskeletal:         General: Normal range of motion.      Right lower leg: No edema.      Left lower leg: No edema.   Skin:     General: Skin is warm and dry.   Neurological:      General: No focal deficit present.      Mental Status: He is alert and oriented to person, place, and time.   Psychiatric:         Mood and Affect: Mood normal.         Behavior: Behavior normal.            Lab Results   Component Value Date/Time    WBC 8.30 09/25/2023 03:29 PM    WBC 7.1 05/15/2023 03:09 PM    RBC 3.85 (L) 09/25/2023 03:29 PM    RBC 4.58 (L) 05/15/2023 03:09 PM    HEMOGLOBIN 12.7 (L) 09/25/2023 03:29 PM    HEMOGLOBIN 14.9 05/15/2023 03:09 PM    HEMATOCRIT 37.6 (L) 09/25/2023 03:29 PM    HEMATOCRIT 45.4 05/15/2023 03:09 PM    MCV 97.7 (H) 09/25/2023 03:29 PM    MCV 99.1 (H) 05/15/2023 03:09 PM    MCH 32.9 09/25/2023 03:29 PM    MCH 32.5 05/15/2023 03:09 PM    MCHC 33.6 09/25/2023 03:29 PM    MCHC 32.8 " (L) 05/15/2023 03:09 PM    MPV 7.7 09/25/2023 03:29 PM    MPV 9.7 05/15/2023 03:09 PM    NEUTSPOLYS 76.0 09/25/2023 03:29 PM    NEUTSPOLYS 66.90 01/12/2023 10:51 AM    LYMPHOCYTES 11.8 09/25/2023 03:29 PM    LYMPHOCYTES 18.50 (L) 01/12/2023 10:51 AM    MONOCYTES 9.3 09/25/2023 03:29 PM    MONOCYTES 9.30 01/12/2023 10:51 AM    EOSINOPHILS 2.3 09/25/2023 03:29 PM    EOSINOPHILS 3.70 01/12/2023 10:51 AM    BASOPHILS 0.6 09/25/2023 03:29 PM    BASOPHILS 1.00 01/12/2023 10:51 AM      Lab Results   Component Value Date/Time    CHOLSTRLTOT 108 06/13/2021 02:35 AM    LDL 53 06/13/2021 02:35 AM    HDL 40 06/13/2021 02:35 AM    TRIGLYCERIDE 75 06/13/2021 02:35 AM       Lab Results   Component Value Date/Time    SODIUM 143 09/25/2023 03:29 PM    SODIUM 141 05/15/2023 03:09 PM    POTASSIUM 3.9 09/25/2023 03:29 PM    POTASSIUM 4.3 05/15/2023 03:09 PM    CHLORIDE 108 09/25/2023 03:29 PM    CHLORIDE 105 05/15/2023 03:09 PM    CO2 27.0 09/25/2023 03:29 PM    CO2 23 05/15/2023 03:09 PM    GLUCOSE 108 09/25/2023 03:29 PM    GLUCOSE 107 (H) 05/15/2023 03:09 PM    BUN 16.0 09/25/2023 03:29 PM    BUN 22 05/15/2023 03:09 PM    CREATININE 1.2 09/25/2023 03:29 PM    CREATININE 1.32 05/15/2023 03:09 PM    BUNCREATRAT 13.3 09/25/2023 03:29 PM     Lab Results   Component Value Date/Time    ALKPHOSPHAT 133 (H) 09/25/2023 03:29 PM    ALKPHOSPHAT 123 (H) 05/15/2023 03:09 PM    ASTSGOT 16 09/25/2023 03:29 PM    ASTSGOT 16 05/15/2023 03:09 PM    ALTSGPT 10 09/25/2023 03:29 PM    ALTSGPT 11 05/15/2023 03:09 PM    TBILIRUBIN 0.7 09/25/2023 03:29 PM    TBILIRUBIN 0.6 05/15/2023 03:09 PM      Echocardiogram 12/19/2023  CONCLUSIONS  The left ventricular ejection fraction is visually estimated to be 55%.  Mild mitral regurgitation.  Moderate aortic valve stenosis.  Transvalvular gradients are - Peak: 42 mmHg, Mean: 30 mmHg.  Mild aortic insufficiency.  The ascending aorta is dilated with a diameter of 4.4 cm.     Angiogram 5/19/2023  IMPRESSIONS:  1.  Severe aortic stenosis (D1)  2. Moderate LAD/RCA CAD with patent circumflex stent  3. Normal right heart hemodynamics, normal CO     Recommendations:  AVR assessment     Pre-procedure diagnosis: Severe aortic stenosis  Post-procedure diagnosis: Same     Findings   Hemodynamics:   Aorta: 115/66 mmHg  LVEDP: 23 mmHg  RA: 7 mmHg  RV: 27/10 mmHg  PA: 25/10/16 mmHg  PCWP: 10 mmHg  Cardiac Output/Index (thermodilution): 5.4 L/min, 2.7 L/min/m2  Cardiac Output/Index (Frida): 3.9 L/min, 1.9 L/min/m2  ABRAHAM: 0.8 mm2  Aortic valve mean gradient: 41 mmHg     Coronary Anatomy              Left Main:  10% distal stenosis              LAD:  40% proximal and 60% mid stenosis- unchanged from 2021              LCx:  30% proximal stenosis. Patent mid stent. OM1 is small and normal. OM2 is moderate and has <30% stenosis.               RCA: Dominant, 60% proximal stenosis. The PDA has 50% mid stenosis. The PLB has MLI       Assessment & Plan     1. Nonrheumatic aortic valve stenosis        2. Aneurysm of ascending aorta without rupture (HCC)        3. Atherosclerosis of aorta (HCC)        4. Coronary artery disease due to lipid rich plaque        5. HFrEF        6. Hypercoagulable state due to paroxysmal atrial fibrillation (HCC)        7. Hyperlipidemia, unspecified hyperlipidemia type        8. Primary hypertension        9. LVH (left ventricular hypertrophy)        10. Typical atrial flutter (HCC)            Medical Decision Making: Today's Assessment/Status/Plan:        Severe aortic stenosis and ascending aortic aneurysm LVH  -Ascending aorta 4.4 cm on echocardiogram and TAVR CT  - he is not yet ready to go through with TAVR   - we discussed the potential repercussions of delaying treatment leading that severe aortic stenosis typically carries a 2-year mortality rate  -Update echocardiogram for this year     Atrial fibrillation/flutter  -Continue amiodarone and Eliquis 5 mg twice daily, he had not been taking his metoprolol, restart  at 25 mg daily  -Amiodarone risks have been discussed in detail including: QT prolongation and torsades de pointes, conduction system impairment, photosensitivity, blue-gray skin discoloration, hyperthyroidism, hypothyroidism, AST or ALT elevation, corneal micro deposits, optic neuropathy, and pulmonary toxicity.  -Recommended annual surveillance includes ECG, skin examination, TSH with reflex ordered, AST and ALT ordered, yearly eye exam, and chest x-ray  -He has not completed his labs that were ordered at her last visit, recommended to complete before next visit     Heart failure with recovered ejection fraction, NYHA class II, stage C, EF 55%  -Continue metoprolol, spironolactone 25 mg daily, Lasix 20 mg as needed     Coronary Artery Disease  Status post DONOVAN to left circumflex in 2021  - continue medications as above, atorvastatin 40 mg daily  We addressed the management of atherosclerotic artery disease.  He is on proper antiplatelet, cholesterol management and beta-blockers as appropriate.  We addressed the potential side effects and laboratory follow-up for these medications.     Hypertension  - good control  - continue meds as above  - goal < 130/80     Hyperlipidemia  -Most recent LDL 53  -Continue atorvastatin 40 mg daily  -Goal of less than 70  -Check lipid panel      Follow-up with Mar BINGHAM in 6 months, sooner if symptoms worsen     This note was dictated using Dragon speech recognition software.

## 2024-12-23 ENCOUNTER — PHARMACY VISIT (OUTPATIENT)
Dept: PHARMACY | Facility: MEDICAL CENTER | Age: 79
End: 2024-12-23
Payer: COMMERCIAL

## 2025-01-01 ENCOUNTER — APPOINTMENT (OUTPATIENT)
Dept: RADIOLOGY | Facility: MEDICAL CENTER | Age: 80
DRG: 023 | End: 2025-01-01
Attending: INTERNAL MEDICINE
Payer: MEDICARE

## 2025-01-01 ENCOUNTER — APPOINTMENT (OUTPATIENT)
Dept: RADIOLOGY | Facility: MEDICAL CENTER | Age: 80
DRG: 023 | End: 2025-01-01
Attending: NURSE PRACTITIONER
Payer: MEDICARE

## 2025-01-01 ENCOUNTER — APPOINTMENT (OUTPATIENT)
Dept: RADIOLOGY | Facility: MEDICAL CENTER | Age: 80
DRG: 023 | End: 2025-01-01
Attending: RADIOLOGY
Payer: MEDICARE

## 2025-01-01 ENCOUNTER — APPOINTMENT (OUTPATIENT)
Dept: RADIOLOGY | Facility: MEDICAL CENTER | Age: 80
DRG: 023 | End: 2025-01-01
Attending: STUDENT IN AN ORGANIZED HEALTH CARE EDUCATION/TRAINING PROGRAM
Payer: MEDICARE

## 2025-01-01 ENCOUNTER — APPOINTMENT (OUTPATIENT)
Dept: RADIOLOGY | Facility: MEDICAL CENTER | Age: 80
DRG: 023 | End: 2025-01-01
Attending: HOSPITALIST
Payer: MEDICARE

## 2025-01-01 ENCOUNTER — HOSPITAL ENCOUNTER (OUTPATIENT)
Dept: RADIOLOGY | Facility: MEDICAL CENTER | Age: 80
DRG: 023 | End: 2025-05-01
Attending: NURSE PRACTITIONER
Payer: MEDICARE

## 2025-01-01 ENCOUNTER — APPOINTMENT (OUTPATIENT)
Dept: RADIOLOGY | Facility: MEDICAL CENTER | Age: 80
DRG: 023 | End: 2025-01-01
Payer: MEDICARE

## 2025-01-01 ENCOUNTER — APPOINTMENT (OUTPATIENT)
Dept: RADIOLOGY | Facility: MEDICAL CENTER | Age: 80
DRG: 023 | End: 2025-01-01
Attending: PSYCHIATRY & NEUROLOGY
Payer: MEDICARE

## 2025-01-01 ENCOUNTER — APPOINTMENT (OUTPATIENT)
Dept: CARDIOLOGY | Facility: MEDICAL CENTER | Age: 80
DRG: 023 | End: 2025-01-01
Attending: INTERNAL MEDICINE
Payer: MEDICARE

## 2025-01-01 DIAGNOSIS — I63.232 ACUTE CEREBROVASCULAR ACCIDENT (CVA) DUE TO STENOSIS OF LEFT CAROTID ARTERY (HCC): ICD-10-CM

## 2025-01-01 PROCEDURE — 70450 CT HEAD/BRAIN W/O DYE: CPT

## 2025-01-01 PROCEDURE — 37215 TRANSCATH STENT CCA W/EPS: CPT

## 2025-01-01 PROCEDURE — 74018 RADEX ABDOMEN 1 VIEW: CPT

## 2025-01-01 PROCEDURE — 71045 X-RAY EXAM CHEST 1 VIEW: CPT

## 2025-01-01 PROCEDURE — 93306 TTE W/DOPPLER COMPLETE: CPT | Mod: 26 | Performed by: INTERNAL MEDICINE

## 2025-01-01 PROCEDURE — 70551 MRI BRAIN STEM W/O DYE: CPT

## 2025-01-01 PROCEDURE — 93306 TTE W/DOPPLER COMPLETE: CPT

## 2025-03-20 DIAGNOSIS — I25.83 CORONARY ARTERY DISEASE DUE TO LIPID RICH PLAQUE: ICD-10-CM

## 2025-03-20 DIAGNOSIS — I25.10 CORONARY ARTERY DISEASE DUE TO LIPID RICH PLAQUE: ICD-10-CM

## 2025-04-24 ENCOUNTER — PHARMACY VISIT (OUTPATIENT)
Dept: PHARMACY | Facility: MEDICAL CENTER | Age: 80
End: 2025-04-24
Payer: COMMERCIAL

## 2025-04-24 PROCEDURE — RXMED WILLOW AMBULATORY MEDICATION CHARGE

## 2025-05-02 PROBLEM — Z71.89 GOALS OF CARE, COUNSELING/DISCUSSION: Status: ACTIVE | Noted: 2025-01-01

## 2025-05-03 PROBLEM — I35.0 AORTIC STENOSIS: Status: ACTIVE | Noted: 2025-01-01

## 2025-05-05 PROBLEM — E87.8 ELECTROLYTE DEPLETION: Status: RESOLVED | Noted: 2025-01-01 | Resolved: 2025-01-01

## 2025-05-07 PROBLEM — I48.0 PAROXYSMAL ATRIAL FIBRILLATION (HCC): Status: ACTIVE | Noted: 2025-01-01

## 2025-05-09 PROBLEM — R06.6 HICCUPS: Status: ACTIVE | Noted: 2025-01-01

## 2025-05-09 PROBLEM — E78.5 DYSLIPIDEMIA: Status: ACTIVE | Noted: 2025-01-01

## 2025-05-11 PROBLEM — J96.00 RESPIRATORY FAILURE, ACUTE (HCC): Status: ACTIVE | Noted: 2025-01-01

## 2025-05-11 PROBLEM — T17.908A ASPIRATION INTO AIRWAY: Status: ACTIVE | Noted: 2025-01-01

## 2025-05-12 PROBLEM — Z51.5 COMFORT MEASURES ONLY STATUS: Status: ACTIVE | Noted: 2025-01-01

## 2025-06-20 ENCOUNTER — TELEPHONE (OUTPATIENT)
Dept: CARDIOLOGY | Facility: MEDICAL CENTER | Age: 80
End: 2025-06-20
Payer: MEDICARE

## 2025-08-18 DIAGNOSIS — I25.10 CORONARY ARTERY DISEASE DUE TO LIPID RICH PLAQUE: ICD-10-CM

## 2025-08-18 DIAGNOSIS — I25.83 CORONARY ARTERY DISEASE DUE TO LIPID RICH PLAQUE: ICD-10-CM

## (undated) DEVICE — TOWEL STOP TIMEOUT SAFETY FLAG (40EA/CA)

## (undated) DEVICE — NEPTUNE 4 PORT MANIFOLD - (20/PK)

## (undated) DEVICE — SET LEADWIRE 5 LEAD BEDSIDE DISPOSABLE ECG (1SET OF 5/EA)

## (undated) DEVICE — CHLORAPREP 26 ML APPLICATOR - ORANGE TINT(25/CA)

## (undated) DEVICE — SUTURE GENERAL